# Patient Record
Sex: MALE | Race: ASIAN | NOT HISPANIC OR LATINO | URBAN - METROPOLITAN AREA
[De-identification: names, ages, dates, MRNs, and addresses within clinical notes are randomized per-mention and may not be internally consistent; named-entity substitution may affect disease eponyms.]

---

## 2021-05-02 ENCOUNTER — INPATIENT (INPATIENT)
Facility: HOSPITAL | Age: 61
LOS: 18 days | Discharge: HOME | DRG: 207 | End: 2021-05-21
Attending: INTERNAL MEDICINE | Admitting: INTERNAL MEDICINE
Payer: MEDICAID

## 2021-05-02 VITALS
RESPIRATION RATE: 19 BRPM | WEIGHT: 210.1 LBS | HEIGHT: 70 IN | HEART RATE: 89 BPM | DIASTOLIC BLOOD PRESSURE: 70 MMHG | OXYGEN SATURATION: 96 % | TEMPERATURE: 98 F | SYSTOLIC BLOOD PRESSURE: 110 MMHG

## 2021-05-02 PROCEDURE — 99291 CRITICAL CARE FIRST HOUR: CPT

## 2021-05-02 NOTE — ED ADULT TRIAGE NOTE - CHIEF COMPLAINT QUOTE
elevated temperatures today with shortness of breath upon laying down. (desat to 88%). daughter placed on O2 at home and suspects COVID.

## 2021-05-03 DIAGNOSIS — J96.01 ACUTE RESPIRATORY FAILURE WITH HYPOXIA: ICD-10-CM

## 2021-05-03 DIAGNOSIS — N17.9 ACUTE KIDNEY FAILURE, UNSPECIFIED: ICD-10-CM

## 2021-05-03 DIAGNOSIS — R74.8 ABNORMAL LEVELS OF OTHER SERUM ENZYMES: ICD-10-CM

## 2021-05-03 DIAGNOSIS — Z29.9 ENCOUNTER FOR PROPHYLACTIC MEASURES, UNSPECIFIED: ICD-10-CM

## 2021-05-03 DIAGNOSIS — R09.02 HYPOXEMIA: ICD-10-CM

## 2021-05-03 DIAGNOSIS — Z78.9 OTHER SPECIFIED HEALTH STATUS: Chronic | ICD-10-CM

## 2021-05-03 DIAGNOSIS — E11.9 TYPE 2 DIABETES MELLITUS WITHOUT COMPLICATIONS: ICD-10-CM

## 2021-05-03 DIAGNOSIS — E78.5 HYPERLIPIDEMIA, UNSPECIFIED: ICD-10-CM

## 2021-05-03 DIAGNOSIS — I10 ESSENTIAL (PRIMARY) HYPERTENSION: ICD-10-CM

## 2021-05-03 DIAGNOSIS — U07.1 COVID-19: ICD-10-CM

## 2021-05-03 LAB
ALBUMIN SERPL ELPH-MCNC: 3.3 G/DL — SIGNIFICANT CHANGE UP (ref 3.3–5)
ALBUMIN SERPL ELPH-MCNC: 3.9 G/DL — SIGNIFICANT CHANGE UP (ref 3.3–5)
ALP SERPL-CCNC: 81 U/L — SIGNIFICANT CHANGE UP (ref 40–120)
ALP SERPL-CCNC: 93 U/L — SIGNIFICANT CHANGE UP (ref 40–120)
ALT FLD-CCNC: 132 U/L — HIGH (ref 10–45)
ALT FLD-CCNC: 157 U/L — HIGH (ref 10–45)
ANION GAP SERPL CALC-SCNC: 11 MMOL/L — SIGNIFICANT CHANGE UP (ref 5–17)
ANION GAP SERPL CALC-SCNC: 16 MMOL/L — SIGNIFICANT CHANGE UP (ref 5–17)
AST SERPL-CCNC: 104 U/L — HIGH (ref 10–40)
AST SERPL-CCNC: 136 U/L — HIGH (ref 10–40)
BASOPHILS # BLD AUTO: 0 K/UL — SIGNIFICANT CHANGE UP (ref 0–0.2)
BASOPHILS # BLD AUTO: 0.01 K/UL — SIGNIFICANT CHANGE UP (ref 0–0.2)
BASOPHILS NFR BLD AUTO: 0 % — SIGNIFICANT CHANGE UP (ref 0–2)
BASOPHILS NFR BLD AUTO: 0.2 % — SIGNIFICANT CHANGE UP (ref 0–2)
BILIRUB SERPL-MCNC: 0.6 MG/DL — SIGNIFICANT CHANGE UP (ref 0.2–1.2)
BILIRUB SERPL-MCNC: 0.7 MG/DL — SIGNIFICANT CHANGE UP (ref 0.2–1.2)
BUN SERPL-MCNC: 20 MG/DL — SIGNIFICANT CHANGE UP (ref 7–23)
BUN SERPL-MCNC: 21 MG/DL — SIGNIFICANT CHANGE UP (ref 7–23)
CALCIUM SERPL-MCNC: 8.6 MG/DL — SIGNIFICANT CHANGE UP (ref 8.4–10.5)
CALCIUM SERPL-MCNC: 9 MG/DL — SIGNIFICANT CHANGE UP (ref 8.4–10.5)
CHLORIDE SERPL-SCNC: 101 MMOL/L — SIGNIFICANT CHANGE UP (ref 96–108)
CHLORIDE SERPL-SCNC: 99 MMOL/L — SIGNIFICANT CHANGE UP (ref 96–108)
CO2 SERPL-SCNC: 23 MMOL/L — SIGNIFICANT CHANGE UP (ref 22–31)
CO2 SERPL-SCNC: 24 MMOL/L — SIGNIFICANT CHANGE UP (ref 22–31)
CREAT SERPL-MCNC: 1.17 MG/DL — SIGNIFICANT CHANGE UP (ref 0.5–1.3)
CREAT SERPL-MCNC: 1.38 MG/DL — HIGH (ref 0.5–1.3)
CRP SERPL-MCNC: 82 MG/L — HIGH (ref 0–4)
D DIMER BLD IA.RAPID-MCNC: 258 NG/ML DDU — HIGH
EOSINOPHIL # BLD AUTO: 0 K/UL — SIGNIFICANT CHANGE UP (ref 0–0.5)
EOSINOPHIL # BLD AUTO: 0.17 K/UL — SIGNIFICANT CHANGE UP (ref 0–0.5)
EOSINOPHIL NFR BLD AUTO: 0 % — SIGNIFICANT CHANGE UP (ref 0–6)
EOSINOPHIL NFR BLD AUTO: 2.8 % — SIGNIFICANT CHANGE UP (ref 0–6)
FERRITIN SERPL-MCNC: 1765 NG/ML — HIGH (ref 30–400)
GAS PNL BLDV: SIGNIFICANT CHANGE UP
GLUCOSE SERPL-MCNC: 149 MG/DL — HIGH (ref 70–99)
GLUCOSE SERPL-MCNC: 187 MG/DL — HIGH (ref 70–99)
HCT VFR BLD CALC: 42.9 % — SIGNIFICANT CHANGE UP (ref 39–50)
HCT VFR BLD CALC: 47.9 % — SIGNIFICANT CHANGE UP (ref 39–50)
HGB BLD-MCNC: 14.3 G/DL — SIGNIFICANT CHANGE UP (ref 13–17)
HGB BLD-MCNC: 15.6 G/DL — SIGNIFICANT CHANGE UP (ref 13–17)
IMM GRANULOCYTES NFR BLD AUTO: 0.5 % — SIGNIFICANT CHANGE UP (ref 0–1.5)
IMM GRANULOCYTES NFR BLD AUTO: 0.5 % — SIGNIFICANT CHANGE UP (ref 0–1.5)
INR BLD: 0.99 RATIO — SIGNIFICANT CHANGE UP (ref 0.88–1.16)
LYMPHOCYTES # BLD AUTO: 0.38 K/UL — LOW (ref 1–3.3)
LYMPHOCYTES # BLD AUTO: 0.66 K/UL — LOW (ref 1–3.3)
LYMPHOCYTES # BLD AUTO: 10.8 % — LOW (ref 13–44)
LYMPHOCYTES # BLD AUTO: 9.8 % — LOW (ref 13–44)
MAGNESIUM SERPL-MCNC: 2.3 MG/DL — SIGNIFICANT CHANGE UP (ref 1.6–2.6)
MCHC RBC-ENTMCNC: 28.5 PG — SIGNIFICANT CHANGE UP (ref 27–34)
MCHC RBC-ENTMCNC: 29.1 PG — SIGNIFICANT CHANGE UP (ref 27–34)
MCHC RBC-ENTMCNC: 32.6 GM/DL — SIGNIFICANT CHANGE UP (ref 32–36)
MCHC RBC-ENTMCNC: 33.3 GM/DL — SIGNIFICANT CHANGE UP (ref 32–36)
MCV RBC AUTO: 87.2 FL — SIGNIFICANT CHANGE UP (ref 80–100)
MCV RBC AUTO: 87.4 FL — SIGNIFICANT CHANGE UP (ref 80–100)
MONOCYTES # BLD AUTO: 0.2 K/UL — SIGNIFICANT CHANGE UP (ref 0–0.9)
MONOCYTES # BLD AUTO: 0.52 K/UL — SIGNIFICANT CHANGE UP (ref 0–0.9)
MONOCYTES NFR BLD AUTO: 5.2 % — SIGNIFICANT CHANGE UP (ref 2–14)
MONOCYTES NFR BLD AUTO: 8.5 % — SIGNIFICANT CHANGE UP (ref 2–14)
NEUTROPHILS # BLD AUTO: 3.28 K/UL — SIGNIFICANT CHANGE UP (ref 1.8–7.4)
NEUTROPHILS # BLD AUTO: 4.71 K/UL — SIGNIFICANT CHANGE UP (ref 1.8–7.4)
NEUTROPHILS NFR BLD AUTO: 77.2 % — HIGH (ref 43–77)
NEUTROPHILS NFR BLD AUTO: 84.5 % — HIGH (ref 43–77)
NRBC # BLD: 0 /100 WBCS — SIGNIFICANT CHANGE UP (ref 0–0)
NRBC # BLD: 0 /100 WBCS — SIGNIFICANT CHANGE UP (ref 0–0)
NT-PROBNP SERPL-SCNC: 143 PG/ML — SIGNIFICANT CHANGE UP (ref 0–300)
PHOSPHATE SERPL-MCNC: 3.3 MG/DL — SIGNIFICANT CHANGE UP (ref 2.5–4.5)
PLATELET # BLD AUTO: 183 K/UL — SIGNIFICANT CHANGE UP (ref 150–400)
PLATELET # BLD AUTO: 205 K/UL — SIGNIFICANT CHANGE UP (ref 150–400)
POTASSIUM SERPL-MCNC: 4.1 MMOL/L — SIGNIFICANT CHANGE UP (ref 3.5–5.3)
POTASSIUM SERPL-MCNC: 4.8 MMOL/L — SIGNIFICANT CHANGE UP (ref 3.5–5.3)
POTASSIUM SERPL-SCNC: 4.1 MMOL/L — SIGNIFICANT CHANGE UP (ref 3.5–5.3)
POTASSIUM SERPL-SCNC: 4.8 MMOL/L — SIGNIFICANT CHANGE UP (ref 3.5–5.3)
PROCALCITONIN SERPL-MCNC: 0.25 NG/ML — HIGH (ref 0.02–0.1)
PROT SERPL-MCNC: 6.5 G/DL — SIGNIFICANT CHANGE UP (ref 6–8.3)
PROT SERPL-MCNC: 7.4 G/DL — SIGNIFICANT CHANGE UP (ref 6–8.3)
PROTHROM AB SERPL-ACNC: 11.9 SEC — SIGNIFICANT CHANGE UP (ref 10.6–13.6)
RBC # BLD: 4.92 M/UL — SIGNIFICANT CHANGE UP (ref 4.2–5.8)
RBC # BLD: 5.48 M/UL — SIGNIFICANT CHANGE UP (ref 4.2–5.8)
RBC # FLD: 11.9 % — SIGNIFICANT CHANGE UP (ref 10.3–14.5)
RBC # FLD: 11.9 % — SIGNIFICANT CHANGE UP (ref 10.3–14.5)
SARS-COV-2 RNA SPEC QL NAA+PROBE: DETECTED
SODIUM SERPL-SCNC: 136 MMOL/L — SIGNIFICANT CHANGE UP (ref 135–145)
SODIUM SERPL-SCNC: 138 MMOL/L — SIGNIFICANT CHANGE UP (ref 135–145)
TROPONIN T, HIGH SENSITIVITY RESULT: 11 NG/L — SIGNIFICANT CHANGE UP (ref 0–51)
WBC # BLD: 3.88 K/UL — SIGNIFICANT CHANGE UP (ref 3.8–10.5)
WBC # BLD: 6.1 K/UL — SIGNIFICANT CHANGE UP (ref 3.8–10.5)
WBC # FLD AUTO: 3.88 K/UL — SIGNIFICANT CHANGE UP (ref 3.8–10.5)
WBC # FLD AUTO: 6.1 K/UL — SIGNIFICANT CHANGE UP (ref 3.8–10.5)

## 2021-05-03 PROCEDURE — 71045 X-RAY EXAM CHEST 1 VIEW: CPT | Mod: 26

## 2021-05-03 PROCEDURE — 99223 1ST HOSP IP/OBS HIGH 75: CPT

## 2021-05-03 RX ORDER — ENOXAPARIN SODIUM 100 MG/ML
40 INJECTION SUBCUTANEOUS DAILY
Refills: 0 | Status: DISCONTINUED | OUTPATIENT
Start: 2021-05-03 | End: 2021-05-18

## 2021-05-03 RX ORDER — LOSARTAN POTASSIUM 100 MG/1
100 TABLET, FILM COATED ORAL DAILY
Refills: 0 | Status: DISCONTINUED | OUTPATIENT
Start: 2021-05-03 | End: 2021-05-03

## 2021-05-03 RX ORDER — DEXTROSE 50 % IN WATER 50 %
15 SYRINGE (ML) INTRAVENOUS ONCE
Refills: 0 | Status: DISCONTINUED | OUTPATIENT
Start: 2021-05-03 | End: 2021-05-11

## 2021-05-03 RX ORDER — LOSARTAN POTASSIUM 100 MG/1
1 TABLET, FILM COATED ORAL
Qty: 0 | Refills: 0 | DISCHARGE

## 2021-05-03 RX ORDER — SODIUM CHLORIDE 9 MG/ML
1000 INJECTION, SOLUTION INTRAVENOUS
Refills: 0 | Status: DISCONTINUED | OUTPATIENT
Start: 2021-05-03 | End: 2021-05-11

## 2021-05-03 RX ORDER — METFORMIN HYDROCHLORIDE 850 MG/1
1 TABLET ORAL
Qty: 0 | Refills: 0 | DISCHARGE

## 2021-05-03 RX ORDER — REMDESIVIR 5 MG/ML
100 INJECTION INTRAVENOUS EVERY 24 HOURS
Refills: 0 | Status: COMPLETED | OUTPATIENT
Start: 2021-05-04 | End: 2021-05-07

## 2021-05-03 RX ORDER — ACETAMINOPHEN 500 MG
650 TABLET ORAL ONCE
Refills: 0 | Status: COMPLETED | OUTPATIENT
Start: 2021-05-03 | End: 2021-05-03

## 2021-05-03 RX ORDER — REMDESIVIR 5 MG/ML
INJECTION INTRAVENOUS
Refills: 0 | Status: COMPLETED | OUTPATIENT
Start: 2021-05-03 | End: 2021-05-07

## 2021-05-03 RX ORDER — ATORVASTATIN CALCIUM 80 MG/1
20 TABLET, FILM COATED ORAL AT BEDTIME
Refills: 0 | Status: DISCONTINUED | OUTPATIENT
Start: 2021-05-03 | End: 2021-05-18

## 2021-05-03 RX ORDER — DEXAMETHASONE 0.5 MG/5ML
6 ELIXIR ORAL ONCE
Refills: 0 | Status: COMPLETED | OUTPATIENT
Start: 2021-05-03 | End: 2021-05-03

## 2021-05-03 RX ORDER — DEXTROSE 50 % IN WATER 50 %
25 SYRINGE (ML) INTRAVENOUS ONCE
Refills: 0 | Status: DISCONTINUED | OUTPATIENT
Start: 2021-05-03 | End: 2021-05-11

## 2021-05-03 RX ORDER — GLUCAGON INJECTION, SOLUTION 0.5 MG/.1ML
1 INJECTION, SOLUTION SUBCUTANEOUS ONCE
Refills: 0 | Status: DISCONTINUED | OUTPATIENT
Start: 2021-05-03 | End: 2021-05-11

## 2021-05-03 RX ORDER — DEXTROSE 50 % IN WATER 50 %
12.5 SYRINGE (ML) INTRAVENOUS ONCE
Refills: 0 | Status: DISCONTINUED | OUTPATIENT
Start: 2021-05-03 | End: 2021-05-11

## 2021-05-03 RX ORDER — INSULIN LISPRO 100/ML
VIAL (ML) SUBCUTANEOUS
Refills: 0 | Status: DISCONTINUED | OUTPATIENT
Start: 2021-05-03 | End: 2021-05-11

## 2021-05-03 RX ORDER — DEXAMETHASONE 0.5 MG/5ML
6 ELIXIR ORAL DAILY
Refills: 0 | Status: DISCONTINUED | OUTPATIENT
Start: 2021-05-04 | End: 2021-05-14

## 2021-05-03 RX ORDER — REMDESIVIR 5 MG/ML
200 INJECTION INTRAVENOUS EVERY 24 HOURS
Refills: 0 | Status: COMPLETED | OUTPATIENT
Start: 2021-05-03 | End: 2021-05-03

## 2021-05-03 RX ORDER — INSULIN LISPRO 100/ML
VIAL (ML) SUBCUTANEOUS AT BEDTIME
Refills: 0 | Status: DISCONTINUED | OUTPATIENT
Start: 2021-05-03 | End: 2021-05-11

## 2021-05-03 RX ORDER — ATORVASTATIN CALCIUM 80 MG/1
1 TABLET, FILM COATED ORAL
Qty: 0 | Refills: 0 | DISCHARGE

## 2021-05-03 RX ADMIN — Medication 650 MILLIGRAM(S): at 00:57

## 2021-05-03 RX ADMIN — Medication 2: at 17:43

## 2021-05-03 RX ADMIN — REMDESIVIR 500 MILLIGRAM(S): 5 INJECTION INTRAVENOUS at 09:37

## 2021-05-03 RX ADMIN — Medication 650 MILLIGRAM(S): at 02:50

## 2021-05-03 RX ADMIN — ATORVASTATIN CALCIUM 20 MILLIGRAM(S): 80 TABLET, FILM COATED ORAL at 21:11

## 2021-05-03 RX ADMIN — Medication 2: at 14:01

## 2021-05-03 RX ADMIN — Medication 6 MILLIGRAM(S): at 00:58

## 2021-05-03 NOTE — CHART NOTE - NSCHARTNOTEFT_GEN_A_CORE
Patient seen and examined.  On 3L O2 via NC. States that breathing is improved.  Continue with remdesivir. LFts are stable.  Cr has improved.   C/W supportive measures.

## 2021-05-03 NOTE — ED PROVIDER NOTE - ATTENDING CONTRIBUTION TO CARE
MD Sheikh:  patient seen and evaluated personally.   I agree with the History & Physical,  Impression & Plan other than what was detailed in my note.  MD Sheikh  61 yo hx of htn, dm, no pulm/cardiac hx, presenting w/ sob x 1 week, that has been getting progressively worse, pos orthopnea, no leg swelling, no hx of dvt/pe, no known covid contact, coughing clear sputum, afebrile hypoxic to 89 on ra,  unwell appearing, NC/AT,  conjunctiva non conjected, sclera anicteric, moist mucous membranes, neck supple, heart sounds, normal, no mrg, lungs have b/l crackles, , abd soft non distended w/ no tenderness, no visual deformities of extremities. neg martínez/homans, no swelling, no jvd, possible covid, vs chf, less likely acs, will get dimer. decadron, cxr, labs. pt will need admission for hypoxia

## 2021-05-03 NOTE — ED PROVIDER NOTE - NS ED ROS FT
ROS:  GENERAL: +fever  EYES: no change in vision  HEENT: no trouble swallowing, no trouble speaking  CARDIAC: no chest pain  PULMONARY: +cough and SOB   GI: no abdominal pain, no nausea, no vomiting, no diarrhea, no constipation  : No dysuria, no frequency, no change in appearance, or odor of urine  SKIN: no rashes  NEURO: no headache, no weakness  MSK: No joint pain    Jeffry Arenas PGY3

## 2021-05-03 NOTE — ED ADULT NURSE NOTE - NSIMPLEMENTINTERV_GEN_ALL_ED
Implemented All Fall Risk Interventions:  [place] to call system. Call bell, personal items and telephone within reach. Instruct patient to call for assistance. Room bathroom lighting operational. Non-slip footwear when patient is off stretcher. Physically safe environment: no spills, clutter or unnecessary equipment. Stretcher in lowest position, wheels locked, appropriate side rails in place. Provide visual cue, wrist band, yellow gown, etc. Monitor gait and stability. Monitor for mental status changes and reorient to person, place, and time. Review medications for side effects contributing to fall risk. Reinforce activity limits and safety measures with patient and family.

## 2021-05-03 NOTE — H&P ADULT - NSHPPHYSICALEXAM_GEN_ALL_CORE
PHYSICAL EXAM:  Vital Signs Last 24 Hrs  T(C): 37.1 (05-03-21 @ 02:49)  T(F): 98.7 (05-03-21 @ 02:49), Max: 98.7 (05-03-21 @ 02:49)  HR: 71 (05-03-21 @ 02:49) (71 - 89)  BP: 117/68 (05-03-21 @ 02:49)  BP(mean): 83 (05-03-21 @ 02:49) (81 - 83)  RR: 23 (05-03-21 @ 02:49) (19 - 26)  SpO2: 98% (05-03-21 @ 02:49) (88% - 98%)  Wt(kg): --    Constitutional: NAD, awake and alert  EYES: EOMI  ENT:  Normal Hearing, no tonsillar exudates   Neck: Soft and supple, No JVD  Lungs: mild bibasilar crackles  Heart: S1 and S2, regular rate and rhythm, no Murmurs, gallops or rubs  Abdomen: Bowel Sounds present, soft, nontender, nondistended, no guarding, no rebound  Extremities: No cyanosis or clubbing; warm to touch  Vascular: 2+ peripheral pulses lower ex  Neurological: A/O x 3, no focal deficits  Musculoskeletal: 5/5 strength b/l upper and lower extremities  Skin: No rashes  Psych: no depression or anhedonia  HEME: no bruises, no nose bleeds PHYSICAL EXAM:  Vital Signs Last 24 Hrs  T(C): 37.1 (05-03-21 @ 02:49)  T(F): 98.7 (05-03-21 @ 02:49), Max: 98.7 (05-03-21 @ 02:49)  HR: 71 (05-03-21 @ 02:49) (71 - 89)  BP: 117/68 (05-03-21 @ 02:49)  BP(mean): 83 (05-03-21 @ 02:49) (81 - 83)  RR: 23 (05-03-21 @ 02:49) (19 - 26)  SpO2: 98% (05-03-21 @ 02:49) (88% - 98%)  Wt(kg): --    Constitutional: NAD, awake and alert  EYES: EOMI  ENT:  Normal Hearing, no tonsillar exudates   Neck: Soft and supple, No JVD  Lungs: mild bibasilar crackles  Heart: S1 and S2, regular rate and rhythm, no Murmurs, gallops or rubs  Abdomen: Bowel Sounds present, soft, nontender, nondistended, no guarding, no rebound  Extremities: No cyanosis or clubbing; warm to touch  Vascular: 2+ peripheral pulses lower ex  Neurological: A/O x 3, no focal deficits  Musculoskeletal: 5/5 strength b/l upper and lower extremities; +straight leg test  Skin: No rashes  Psych: no depression or anhedonia  HEME: no bruises, no nose bleeds

## 2021-05-03 NOTE — H&P ADULT - PROBLEM SELECTOR PLAN 2
s/p IV dexamethasone, which will continue for now  start IV remdesivir (monitor LFTs)  c/w other symptomatic management  monitor inflammatory markers  c/w oxygen

## 2021-05-03 NOTE — ED ADULT NURSE REASSESSMENT NOTE - NS ED NURSE REASSESS COMMENT FT1
JAY Smith contacted regarding insulin order, order reads pre-meal insulin but special instructions indicate to give insulin coverage regardless of PO status, as per JAY Smith order insulin coverage to not be given since pt is not eating at this time, , VSS, pt A&Ox3, pending bed assignment, bed locked and in lowest position, safety and comfort measures maintained.

## 2021-05-03 NOTE — ED PROVIDER NOTE - PHYSICAL EXAMINATION
Gen: AAOx3, non-toxic  Head: NCAT  HEENT: EOMI, oral mucosa moist, normal conjunctiva  Lung: bibasilar crackles, no respiratory distress, speaking in full sentences  CV: RRR, no murmurs, rubs or gallops  Abd: soft, NTND  MSK: no visible deformities  Neuro: No focal sensory or motor deficits  Skin: Warm, well perfused, no rash, no LE edema or calf TTP   Psych: normal affect.     Jeffry Arenas PGY3

## 2021-05-03 NOTE — H&P ADULT - PROBLEM SELECTOR PLAN 3
Likely 2/2 to NAFLD  will monitor LFTs for now while patient is on remdesivir  check hepatitis panel  if LFTs worsening, will check US abdomen

## 2021-05-03 NOTE — H&P ADULT - NSHPLABSRESULTS_GEN_ALL_CORE
Labs personally reviewed:                          15.6   6.10  )-----------( 205      ( 03 May 2021 00:54 )             47.9     05-03    138  |  99  |  21  ----------------------------<  149<H>  4.1   |  23  |  1.38<H>    Ca    9.0      03 May 2021 00:54    TPro  7.4  /  Alb  3.9  /  TBili  0.7  /  DBili  x   /  AST  136<H>  /  ALT  157<H>  /  AlkPhos  93  05-03        LIVER FUNCTIONS - ( 03 May 2021 00:54 )  Alb: 3.9 g/dL / Pro: 7.4 g/dL / ALK PHOS: 93 U/L / ALT: 157 U/L / AST: 136 U/L / GGT: x           Imaging:  CXR personally reviewed: bibasilar opacity    EKG pending

## 2021-05-03 NOTE — ED ADULT NURSE REASSESSMENT NOTE - NS ED NURSE REASSESS COMMENT FT1
Report received from RN. Pt received sleeping resting comfortaby in bed, IV intact and patent, VSS, pt doesn't appear in acute distress, bed locked and in lowest position, call bell within reach,, safety and comfort measures maintained.

## 2021-05-03 NOTE — ED ADULT NURSE NOTE - OBJECTIVE STATEMENT
60yM presents to the ED from home with complaints of SOB, dry cough, decreased PO intake, and weakness x1week. PMHx of DM and HTN. x1week of dry cough with increased SOB (worse with exertion and lying flat). Pt reports decreased appetite r/t bitter taste in mouth. Associated weakness and feeling "tired". Pt denies fevers/chills, CP, N/V, abdominal pain, back pain, urinary symptoms, diaphoresis, lightheadedness/dizziness/syncope, HA. Upon arrival to ED, pt AAOx4, VS as documented, pt placed on spo2 and cardiac monitor. EKG completed. Pt awake but reports feeling sleepy/tired. Spo2 90% on RA, ambulatory spo2 88% and pt placed on 3L NC O2. Mild increased WOB and abdominal retractions noted. Pt abdomen soft and nontender to palpation. Pt has + pulses in UE and LE BL. Pt has no edema in LE BL. Pt able to ambulate with steady gait in ED. IV placed and labs drawn and sent. COVID swab sent. Pt medicated as per MD order. Bed locked in lowest position with appropriate side rails raised. Pt given call bell and explained call bell system. Pt aware of plan to await lab and radiology results. 60yM presents to the ED from home with complaints of SOB, dry cough, decreased PO intake, and weakness x1week. PMHx of DM and HTN. x1week of dry cough with increased SOB (worse with exertion and lying flat). Pt reports decreased appetite r/t bitter taste in mouth. Associated weakness and feeling "tired". Pt denies fevers/chills, CP, N/V, abdominal pain, back pain, urinary symptoms, diaphoresis, lightheadedness/dizziness/syncope, HA. Upon arrival to ED, pt AAOx4, VS as documented, pt placed on spo2 and cardiac monitor. EKG completed. Pt awake but reports feeling sleepy/tired. Spo2 90% on RA, ambulatory spo2 88% and pt placed on 3L NC O2 with spo2 99%. Mild increased WOB and abdominal retractions noted. Pt abdomen soft and nontender to palpation. Pt has + pulses in UE and LE BL. Pt has no edema in LE BL. Pt able to ambulate with steady gait in ED. IV placed and labs drawn and sent. COVID swab sent. Pt medicated as per MD order. Bed locked in lowest position with appropriate side rails raised. Pt given call bell and explained call bell system. Pt aware of plan to await lab and radiology results.

## 2021-05-03 NOTE — H&P ADULT - NSHPREVIEWOFSYSTEMS_GEN_ALL_CORE
CONSTITUTIONAL: No weakness, fevers or chills  EYES/ENT: No visual changes;  No dysphagia  NECK: No pain or stiffness  RESPIRATORY: +cough and SOB  CARDIOVASCULAR: No chest pain or palpitations; No lower extremity edema  EXTREMITIES: no le edema, cyanosis, clubbing  MUSCULOSKELETAL: myalgia  GASTROINTESTINAL: No abdominal or epigastric pain. No nausea, vomiting, or hematemesis; No diarrhea or constipation. No melena or hematochezia.  BACK: + back pain  GENITOURINARY: No dysuria, frequency or hematuria  NEUROLOGICAL: No numbness or weakness  SKIN: No itching, burning, rashes, or lesions   PSYCH: no agitation  All other review of systems is negative unless indicated above.

## 2021-05-03 NOTE — H&P ADULT - PROBLEM SELECTOR PLAN 4
unknown baseline creatinine  monitor BMP closely  hold losartan for now, if creatine improves will restart tomorrow

## 2021-05-03 NOTE — ED PROVIDER NOTE - CLINICAL SUMMARY MEDICAL DECISION MAKING FREE TEXT BOX
Fever, cough, SOB x 1 week. No chest pain. No history of heart disease or DVT/PE. Pt mildly tachypneic with bibasilar rales, hypoxic to high 80s on RA but stable in high 90s on 4L NC. Will assess for COVID/PNA, CHF, ACS, PTX with labs/XR & admit.

## 2021-05-03 NOTE — ED PROVIDER NOTE - OBJECTIVE STATEMENT
60M with PMH including HTN and DM p/w fever, dry cough, and SOB x 1 week. Denies any other symptoms including chest pain, abd pain, N/V/D, LE edema, history of cardiac disease, or history of DVT/PE. No sick contacts or travel history. No history of COVID or COVID vaccination.

## 2021-05-03 NOTE — H&P ADULT - HISTORY OF PRESENT ILLNESS
61 yo M with PMH of HTN, HLD, T2DM, presents here with SOB and hypoxia.  Patient started feeling fatigue and malaise on Monday.  Over the last three days, he also started having decreased appetite, mild cough, SOB, feverish, chills.  He has been mostly in bed, not able to do his usual ADLs.  This morning he had an episode of vomiting.  He was also having some back pain, which is worse with walking.  He had no diarrhea.  Daughter-in-law checked his pulse ox at home which was 88%, which prompted her to bring him to hospital.  While in ED, patient felt lethargic and had a brief 20 sec period of unresponsiveness.  Patient denies losing consciousness; states he remembers hearing his daughter-in-law calling his name, but he did not respond because he was not feeling well.  In ED, his vitals showed temp 98.5, HR 89, /70, RR 19-26, saturation 88% on RA.

## 2021-05-03 NOTE — H&P ADULT - NSICDXPASTMEDICALHX_GEN_ALL_CORE_FT
PAST MEDICAL HISTORY:  HLD (hyperlipidemia)     HTN (hypertension)     T2DM (type 2 diabetes mellitus)

## 2021-05-03 NOTE — H&P ADULT - PROBLEM SELECTOR PLAN 1
Likely 2/2 to COVID  unlikely bacterial pneumonia, will hold off on Abx for now  trend inflammatory markers  management for COVID as below

## 2021-05-04 LAB
A1C WITH ESTIMATED AVERAGE GLUCOSE RESULT: 7.2 % — HIGH (ref 4–5.6)
ALBUMIN SERPL ELPH-MCNC: 3.1 G/DL — LOW (ref 3.3–5)
ALP SERPL-CCNC: 85 U/L — SIGNIFICANT CHANGE UP (ref 40–120)
ALT FLD-CCNC: 181 U/L — HIGH (ref 10–45)
AST SERPL-CCNC: 167 U/L — HIGH (ref 10–40)
BILIRUB DIRECT SERPL-MCNC: 0.2 MG/DL — SIGNIFICANT CHANGE UP (ref 0–0.2)
BILIRUB INDIRECT FLD-MCNC: 0.3 MG/DL — SIGNIFICANT CHANGE UP (ref 0.2–1)
BILIRUB SERPL-MCNC: 0.5 MG/DL — SIGNIFICANT CHANGE UP (ref 0.2–1.2)
COVID-19 SPIKE DOMAIN AB INTERP: POSITIVE
COVID-19 SPIKE DOMAIN ANTIBODY RESULT: 48.5 U/ML — HIGH
CREAT SERPL-MCNC: 0.83 MG/DL — SIGNIFICANT CHANGE UP (ref 0.5–1.3)
ESTIMATED AVERAGE GLUCOSE: 160 MG/DL — HIGH (ref 68–114)
GLUCOSE BLDC GLUCOMTR-MCNC: 155 MG/DL — HIGH (ref 70–99)
GLUCOSE BLDC GLUCOMTR-MCNC: 177 MG/DL — HIGH (ref 70–99)
GLUCOSE BLDC GLUCOMTR-MCNC: 234 MG/DL — HIGH (ref 70–99)
HCV AB S/CO SERPL IA: 0.14 S/CO — SIGNIFICANT CHANGE UP (ref 0–0.99)
HCV AB SERPL-IMP: SIGNIFICANT CHANGE UP
INR BLD: 0.97 RATIO — SIGNIFICANT CHANGE UP (ref 0.88–1.16)
PROT SERPL-MCNC: 6.2 G/DL — SIGNIFICANT CHANGE UP (ref 6–8.3)
PROTHROM AB SERPL-ACNC: 11.7 SEC — SIGNIFICANT CHANGE UP (ref 10.6–13.6)
SARS-COV-2 IGG+IGM SERPL QL IA: 48.5 U/ML — HIGH
SARS-COV-2 IGG+IGM SERPL QL IA: POSITIVE

## 2021-05-04 PROCEDURE — 99233 SBSQ HOSP IP/OBS HIGH 50: CPT

## 2021-05-04 RX ADMIN — REMDESIVIR 500 MILLIGRAM(S): 5 INJECTION INTRAVENOUS at 10:36

## 2021-05-04 RX ADMIN — Medication 6 MILLIGRAM(S): at 05:28

## 2021-05-04 RX ADMIN — ATORVASTATIN CALCIUM 20 MILLIGRAM(S): 80 TABLET, FILM COATED ORAL at 21:24

## 2021-05-04 RX ADMIN — ENOXAPARIN SODIUM 40 MILLIGRAM(S): 100 INJECTION SUBCUTANEOUS at 12:42

## 2021-05-04 RX ADMIN — Medication 4: at 17:38

## 2021-05-04 RX ADMIN — Medication 2: at 12:41

## 2021-05-04 NOTE — PROGRESS NOTE ADULT - SUBJECTIVE AND OBJECTIVE BOX
Dr. Kaylin Mosley   Division of Hospital Medicine  [facility]   Pager: [phone]     Patient is a 60y old  Male who presents with a chief complaint of shortness of breath (03 May 2021 03:11)      SUBJECTIVE / OVERNIGHT EVENTS: Patient seen and examined at bedside. States that SOB is improving, complaints of mild cough, Denies an CP, abd pain and n.v No acute events overnight     ROS:  All other review of systems negative    Allergies    No Known Allergies    Intolerances        MEDICATIONS  (STANDING):  atorvastatin 20 milliGRAM(s) Oral at bedtime  dexAMETHasone  Injectable 6 milliGRAM(s) IV Push daily  dextrose 40% Gel 15 Gram(s) Oral once  dextrose 5%. 1000 milliLiter(s) (50 mL/Hr) IV Continuous <Continuous>  dextrose 5%. 1000 milliLiter(s) (100 mL/Hr) IV Continuous <Continuous>  dextrose 50% Injectable 25 Gram(s) IV Push once  dextrose 50% Injectable 12.5 Gram(s) IV Push once  dextrose 50% Injectable 25 Gram(s) IV Push once  enoxaparin Injectable 40 milliGRAM(s) SubCutaneous daily  glucagon  Injectable 1 milliGRAM(s) IntraMuscular once  insulin lispro (ADMELOG) corrective regimen sliding scale   SubCutaneous three times a day before meals  insulin lispro (ADMELOG) corrective regimen sliding scale   SubCutaneous at bedtime  remdesivir  IVPB   IV Intermittent   remdesivir  IVPB 100 milliGRAM(s) IV Intermittent every 24 hours    MEDICATIONS  (PRN):      Vital Signs Last 24 Hrs  T(C): 37.2 (04 May 2021 05:12), Max: 37.2 (03 May 2021 17:26)  T(F): 99 (04 May 2021 05:12), Max: 99 (03 May 2021 17:26)  HR: 76 (04 May 2021 05:12) (76 - 84)  BP: 118/72 (04 May 2021 05:12) (117/62 - 119/74)  BP(mean): --  RR: 18 (04 May 2021 05:12) (18 - 20)  SpO2: 95% (04 May 2021 05:12) (95% - 95%)  CAPILLARY BLOOD GLUCOSE      POCT Blood Glucose.: 177 mg/dL (04 May 2021 12:17)  POCT Blood Glucose.: 147 mg/dL (04 May 2021 08:26)  POCT Blood Glucose.: 116 mg/dL (03 May 2021 21:51)  POCT Blood Glucose.: 193 mg/dL (03 May 2021 16:49)    I&O's Summary    03 May 2021 07:01  -  04 May 2021 07:00  --------------------------------------------------------  IN: 0 mL / OUT: 1600 mL / NET: -1600 mL    04 May 2021 07:01  -  04 May 2021 14:16  --------------------------------------------------------  IN: 120 mL / OUT: 200 mL / NET: -80 mL        PHYSICAL EXAM:  GENERAL: NAD, well-developed +2L NC  HEAD:  Atraumatic, Normocephalic  EYES: EOMI, PERRLA, conjunctiva and sclera clear  NECK: Supple, No JVD  CHEST/LUNG: Clear to auscultation bilaterally; No wheeze  HEART: Regular rate and rhythm; No murmurs, rubs, or gallops  ABDOMEN: Soft, Nontender, Nondistended; Bowel sounds present  EXTREMITIES:  2+ Peripheral Pulses, No clubbing, cyanosis, or edema  NEUROLOGY: AAOx3, non-focal  PSYCH: calm  SKIN: No rashes or lesions    LABS:                        14.3   3.88  )-----------( 183      ( 03 May 2021 06:04 )             42.9     05-04    x   |  x   |  x   ----------------------------<  x   x    |  x   |  0.83    Ca    8.6      03 May 2021 06:04  Phos  3.3     05-03  Mg     2.3     05-03    TPro  6.2  /  Alb  3.1<L>  /  TBili  0.5  /  DBili  0.2  /  AST  167<H>  /  ALT  181<H>  /  AlkPhos  85  05-04    PT/INR - ( 04 May 2021 06:18 )   PT: 11.7 sec;   INR: 0.97 ratio                   RADIOLOGY & ADDITIONAL TESTS:    Care Discussed with Consultants/Other Providers: Medicine NP

## 2021-05-05 LAB
ALBUMIN SERPL ELPH-MCNC: 3.3 G/DL — SIGNIFICANT CHANGE UP (ref 3.3–5)
ALBUMIN SERPL ELPH-MCNC: 3.3 G/DL — SIGNIFICANT CHANGE UP (ref 3.3–5)
ALP SERPL-CCNC: 89 U/L — SIGNIFICANT CHANGE UP (ref 40–120)
ALP SERPL-CCNC: 89 U/L — SIGNIFICANT CHANGE UP (ref 40–120)
ALT FLD-CCNC: 174 U/L — HIGH (ref 10–45)
ALT FLD-CCNC: 177 U/L — HIGH (ref 10–45)
ANION GAP SERPL CALC-SCNC: 14 MMOL/L — SIGNIFICANT CHANGE UP (ref 5–17)
AST SERPL-CCNC: 97 U/L — HIGH (ref 10–40)
AST SERPL-CCNC: 97 U/L — HIGH (ref 10–40)
BILIRUB DIRECT SERPL-MCNC: 0.2 MG/DL — SIGNIFICANT CHANGE UP (ref 0–0.2)
BILIRUB INDIRECT FLD-MCNC: 0.4 MG/DL — SIGNIFICANT CHANGE UP (ref 0.2–1)
BILIRUB SERPL-MCNC: 0.6 MG/DL — SIGNIFICANT CHANGE UP (ref 0.2–1.2)
BILIRUB SERPL-MCNC: 0.6 MG/DL — SIGNIFICANT CHANGE UP (ref 0.2–1.2)
BUN SERPL-MCNC: 26 MG/DL — HIGH (ref 7–23)
CALCIUM SERPL-MCNC: 8.9 MG/DL — SIGNIFICANT CHANGE UP (ref 8.4–10.5)
CHLORIDE SERPL-SCNC: 105 MMOL/L — SIGNIFICANT CHANGE UP (ref 96–108)
CO2 SERPL-SCNC: 21 MMOL/L — LOW (ref 22–31)
CREAT SERPL-MCNC: 0.86 MG/DL — SIGNIFICANT CHANGE UP (ref 0.5–1.3)
CREAT SERPL-MCNC: 0.88 MG/DL — SIGNIFICANT CHANGE UP (ref 0.5–1.3)
GLUCOSE BLDC GLUCOMTR-MCNC: 110 MG/DL — HIGH (ref 70–99)
GLUCOSE BLDC GLUCOMTR-MCNC: 162 MG/DL — HIGH (ref 70–99)
GLUCOSE BLDC GLUCOMTR-MCNC: 187 MG/DL — HIGH (ref 70–99)
GLUCOSE BLDC GLUCOMTR-MCNC: 234 MG/DL — HIGH (ref 70–99)
GLUCOSE SERPL-MCNC: 126 MG/DL — HIGH (ref 70–99)
HCT VFR BLD CALC: 45.5 % — SIGNIFICANT CHANGE UP (ref 39–50)
HGB BLD-MCNC: 14.8 G/DL — SIGNIFICANT CHANGE UP (ref 13–17)
INR BLD: 1.07 RATIO — SIGNIFICANT CHANGE UP (ref 0.88–1.16)
MCHC RBC-ENTMCNC: 28.6 PG — SIGNIFICANT CHANGE UP (ref 27–34)
MCHC RBC-ENTMCNC: 32.5 GM/DL — SIGNIFICANT CHANGE UP (ref 32–36)
MCV RBC AUTO: 87.8 FL — SIGNIFICANT CHANGE UP (ref 80–100)
NRBC # BLD: 0 /100 WBCS — SIGNIFICANT CHANGE UP (ref 0–0)
PLATELET # BLD AUTO: 269 K/UL — SIGNIFICANT CHANGE UP (ref 150–400)
POTASSIUM SERPL-MCNC: 4.4 MMOL/L — SIGNIFICANT CHANGE UP (ref 3.5–5.3)
POTASSIUM SERPL-SCNC: 4.4 MMOL/L — SIGNIFICANT CHANGE UP (ref 3.5–5.3)
PROT SERPL-MCNC: 6.2 G/DL — SIGNIFICANT CHANGE UP (ref 6–8.3)
PROT SERPL-MCNC: 6.6 G/DL — SIGNIFICANT CHANGE UP (ref 6–8.3)
PROTHROM AB SERPL-ACNC: 12.8 SEC — SIGNIFICANT CHANGE UP (ref 10.6–13.6)
RBC # BLD: 5.18 M/UL — SIGNIFICANT CHANGE UP (ref 4.2–5.8)
RBC # FLD: 11.9 % — SIGNIFICANT CHANGE UP (ref 10.3–14.5)
SODIUM SERPL-SCNC: 140 MMOL/L — SIGNIFICANT CHANGE UP (ref 135–145)
WBC # BLD: 8.85 K/UL — SIGNIFICANT CHANGE UP (ref 3.8–10.5)
WBC # FLD AUTO: 8.85 K/UL — SIGNIFICANT CHANGE UP (ref 3.8–10.5)

## 2021-05-05 PROCEDURE — 99233 SBSQ HOSP IP/OBS HIGH 50: CPT

## 2021-05-05 RX ADMIN — ATORVASTATIN CALCIUM 20 MILLIGRAM(S): 80 TABLET, FILM COATED ORAL at 22:20

## 2021-05-05 RX ADMIN — Medication 2: at 17:28

## 2021-05-05 RX ADMIN — ENOXAPARIN SODIUM 40 MILLIGRAM(S): 100 INJECTION SUBCUTANEOUS at 12:43

## 2021-05-05 RX ADMIN — Medication 6 MILLIGRAM(S): at 06:07

## 2021-05-05 RX ADMIN — Medication 4: at 12:43

## 2021-05-05 RX ADMIN — REMDESIVIR 500 MILLIGRAM(S): 5 INJECTION INTRAVENOUS at 11:37

## 2021-05-05 NOTE — PROGRESS NOTE ADULT - SUBJECTIVE AND OBJECTIVE BOX
Dr. Kaylin Mosley   Division of Hospital Medicine  [facility]   Pager: [phone]     Patient is a 60y old  Male who presents with a chief complaint of shortness of breath (04 May 2021 14:16)      SUBJECTIVE / OVERNIGHT EVENTS: Patient seen and examined at bedside. Denies any CP, SOB, abd pain and n/v. no acute events overnight. this morning desatted to 84s on 2L NC      ROS:  All other review of systems negative    Allergies    No Known Allergies    Intolerances        MEDICATIONS  (STANDING):  atorvastatin 20 milliGRAM(s) Oral at bedtime  dexAMETHasone  Injectable 6 milliGRAM(s) IV Push daily  dextrose 40% Gel 15 Gram(s) Oral once  dextrose 5%. 1000 milliLiter(s) (50 mL/Hr) IV Continuous <Continuous>  dextrose 5%. 1000 milliLiter(s) (100 mL/Hr) IV Continuous <Continuous>  dextrose 50% Injectable 25 Gram(s) IV Push once  dextrose 50% Injectable 12.5 Gram(s) IV Push once  dextrose 50% Injectable 25 Gram(s) IV Push once  enoxaparin Injectable 40 milliGRAM(s) SubCutaneous daily  glucagon  Injectable 1 milliGRAM(s) IntraMuscular once  insulin lispro (ADMELOG) corrective regimen sliding scale   SubCutaneous three times a day before meals  insulin lispro (ADMELOG) corrective regimen sliding scale   SubCutaneous at bedtime  remdesivir  IVPB   IV Intermittent   remdesivir  IVPB 100 milliGRAM(s) IV Intermittent every 24 hours    MEDICATIONS  (PRN):      Vital Signs Last 24 Hrs  T(C): 37 (05 May 2021 04:45), Max: 37.2 (04 May 2021 20:58)  T(F): 98.6 (05 May 2021 04:45), Max: 99 (04 May 2021 20:58)  HR: 79 (05 May 2021 04:45) (78 - 81)  BP: 117/64 (05 May 2021 04:45) (111/67 - 122/73)  BP(mean): --  RR: 19 (05 May 2021 07:40) (18 - 19)  SpO2: 95% (05 May 2021 07:40) (84% - 95%)  CAPILLARY BLOOD GLUCOSE      POCT Blood Glucose.: 234 mg/dL (05 May 2021 12:10)  POCT Blood Glucose.: 110 mg/dL (05 May 2021 08:03)  POCT Blood Glucose.: 155 mg/dL (04 May 2021 21:30)  POCT Blood Glucose.: 234 mg/dL (04 May 2021 17:16)    I&O's Summary    04 May 2021 07:01  -  05 May 2021 07:00  --------------------------------------------------------  IN: 360 mL / OUT: 1075 mL / NET: -715 mL    05 May 2021 07:01  -  05 May 2021 12:44  --------------------------------------------------------  IN: 360 mL / OUT: 200 mL / NET: 160 mL        PHYSICAL EXAM:  GENERAL: NAD, well-developed+3L  HEAD:  Atraumatic, Normocephalic  EYES: EOMI, PERRLA, conjunctiva and sclera clear  NECK: Supple, No JVD  CHEST/LUNG: Clear to auscultation bilaterally; No wheeze  HEART: Regular rate and rhythm; No murmurs, rubs, or gallops  ABDOMEN: Soft, Nontender, Nondistended; Bowel sounds present  EXTREMITIES:  2+ Peripheral Pulses, No clubbing, cyanosis, or edema  NEUROLOGY: AAOx3, non-focal  PSYCH: calm  SKIN: No rashes or lesions    LABS:                        14.8   8.85  )-----------( 269      ( 05 May 2021 06:27 )             45.5     05-05    140  |  105  |  26<H>  ----------------------------<  126<H>  4.4   |  21<L>  |  0.86    Ca    8.9      05 May 2021 06:27    TPro  6.6  /  Alb  3.3  /  TBili  0.6  /  DBili  0.2  /  AST  97<H>  /  ALT  174<H>  /  AlkPhos  89  05-05    PT/INR - ( 05 May 2021 06:27 )   PT: 12.8 sec;   INR: 1.07 ratio                   RADIOLOGY & ADDITIONAL TESTS:    Care Discussed with Consultants/Other Providers: Medicine NP    Unable to reach son Héctor, unable to leave Voice message

## 2021-05-06 LAB
ALBUMIN SERPL ELPH-MCNC: 3.3 G/DL — SIGNIFICANT CHANGE UP (ref 3.3–5)
ALBUMIN SERPL ELPH-MCNC: 3.4 G/DL — SIGNIFICANT CHANGE UP (ref 3.3–5)
ALP SERPL-CCNC: 98 U/L — SIGNIFICANT CHANGE UP (ref 40–120)
ALP SERPL-CCNC: 98 U/L — SIGNIFICANT CHANGE UP (ref 40–120)
ALT FLD-CCNC: 159 U/L — HIGH (ref 10–45)
ALT FLD-CCNC: 161 U/L — HIGH (ref 10–45)
ANION GAP SERPL CALC-SCNC: 15 MMOL/L — SIGNIFICANT CHANGE UP (ref 5–17)
AST SERPL-CCNC: 76 U/L — HIGH (ref 10–40)
AST SERPL-CCNC: 76 U/L — HIGH (ref 10–40)
BILIRUB DIRECT SERPL-MCNC: 0.2 MG/DL — SIGNIFICANT CHANGE UP (ref 0–0.2)
BILIRUB INDIRECT FLD-MCNC: 0.4 MG/DL — SIGNIFICANT CHANGE UP (ref 0.2–1)
BILIRUB SERPL-MCNC: 0.6 MG/DL — SIGNIFICANT CHANGE UP (ref 0.2–1.2)
BILIRUB SERPL-MCNC: 0.6 MG/DL — SIGNIFICANT CHANGE UP (ref 0.2–1.2)
BUN SERPL-MCNC: 27 MG/DL — HIGH (ref 7–23)
CALCIUM SERPL-MCNC: 8.8 MG/DL — SIGNIFICANT CHANGE UP (ref 8.4–10.5)
CHLORIDE SERPL-SCNC: 105 MMOL/L — SIGNIFICANT CHANGE UP (ref 96–108)
CO2 SERPL-SCNC: 21 MMOL/L — LOW (ref 22–31)
CREAT SERPL-MCNC: 0.83 MG/DL — SIGNIFICANT CHANGE UP (ref 0.5–1.3)
CREAT SERPL-MCNC: 0.86 MG/DL — SIGNIFICANT CHANGE UP (ref 0.5–1.3)
CRP SERPL-MCNC: 56 MG/L — HIGH (ref 0–4)
D DIMER BLD IA.RAPID-MCNC: 253 NG/ML DDU — HIGH
FERRITIN SERPL-MCNC: 915 NG/ML — HIGH (ref 30–400)
GLUCOSE BLDC GLUCOMTR-MCNC: 119 MG/DL — HIGH (ref 70–99)
GLUCOSE BLDC GLUCOMTR-MCNC: 124 MG/DL — HIGH (ref 70–99)
GLUCOSE BLDC GLUCOMTR-MCNC: 125 MG/DL — HIGH (ref 70–99)
GLUCOSE BLDC GLUCOMTR-MCNC: 151 MG/DL — HIGH (ref 70–99)
GLUCOSE SERPL-MCNC: 105 MG/DL — HIGH (ref 70–99)
HCT VFR BLD CALC: 47.2 % — SIGNIFICANT CHANGE UP (ref 39–50)
HGB BLD-MCNC: 15.2 G/DL — SIGNIFICANT CHANGE UP (ref 13–17)
INR BLD: 1.04 RATIO — SIGNIFICANT CHANGE UP (ref 0.88–1.16)
MCHC RBC-ENTMCNC: 28.3 PG — SIGNIFICANT CHANGE UP (ref 27–34)
MCHC RBC-ENTMCNC: 32.2 GM/DL — SIGNIFICANT CHANGE UP (ref 32–36)
MCV RBC AUTO: 87.9 FL — SIGNIFICANT CHANGE UP (ref 80–100)
NRBC # BLD: 0 /100 WBCS — SIGNIFICANT CHANGE UP (ref 0–0)
PLATELET # BLD AUTO: 214 K/UL — SIGNIFICANT CHANGE UP (ref 150–400)
POTASSIUM SERPL-MCNC: 4.5 MMOL/L — SIGNIFICANT CHANGE UP (ref 3.5–5.3)
POTASSIUM SERPL-SCNC: 4.5 MMOL/L — SIGNIFICANT CHANGE UP (ref 3.5–5.3)
PROT SERPL-MCNC: 6.4 G/DL — SIGNIFICANT CHANGE UP (ref 6–8.3)
PROT SERPL-MCNC: 6.5 G/DL — SIGNIFICANT CHANGE UP (ref 6–8.3)
PROTHROM AB SERPL-ACNC: 12.4 SEC — SIGNIFICANT CHANGE UP (ref 10.6–13.6)
RBC # BLD: 5.37 M/UL — SIGNIFICANT CHANGE UP (ref 4.2–5.8)
RBC # FLD: 11.9 % — SIGNIFICANT CHANGE UP (ref 10.3–14.5)
SODIUM SERPL-SCNC: 141 MMOL/L — SIGNIFICANT CHANGE UP (ref 135–145)
WBC # BLD: 9.05 K/UL — SIGNIFICANT CHANGE UP (ref 3.8–10.5)
WBC # FLD AUTO: 9.05 K/UL — SIGNIFICANT CHANGE UP (ref 3.8–10.5)

## 2021-05-06 PROCEDURE — 99233 SBSQ HOSP IP/OBS HIGH 50: CPT

## 2021-05-06 RX ORDER — ACETAMINOPHEN 500 MG
650 TABLET ORAL EVERY 6 HOURS
Refills: 0 | Status: DISCONTINUED | OUTPATIENT
Start: 2021-05-06 | End: 2021-05-21

## 2021-05-06 RX ORDER — LIDOCAINE 4 G/100G
1 CREAM TOPICAL DAILY
Refills: 0 | Status: DISCONTINUED | OUTPATIENT
Start: 2021-05-06 | End: 2021-05-12

## 2021-05-06 RX ADMIN — ENOXAPARIN SODIUM 40 MILLIGRAM(S): 100 INJECTION SUBCUTANEOUS at 13:19

## 2021-05-06 RX ADMIN — Medication 2: at 13:19

## 2021-05-06 RX ADMIN — ATORVASTATIN CALCIUM 20 MILLIGRAM(S): 80 TABLET, FILM COATED ORAL at 21:33

## 2021-05-06 RX ADMIN — REMDESIVIR 500 MILLIGRAM(S): 5 INJECTION INTRAVENOUS at 09:33

## 2021-05-06 RX ADMIN — Medication 650 MILLIGRAM(S): at 13:36

## 2021-05-06 RX ADMIN — LIDOCAINE 1 PATCH: 4 CREAM TOPICAL at 18:13

## 2021-05-06 RX ADMIN — Medication 6 MILLIGRAM(S): at 06:42

## 2021-05-06 RX ADMIN — LIDOCAINE 1 PATCH: 4 CREAM TOPICAL at 19:25

## 2021-05-06 NOTE — PROGRESS NOTE ADULT - SUBJECTIVE AND OBJECTIVE BOX
Dr. Kaylin Mosley   Division of Hospital Medicine  [facility]   Pager: [phone]     Patient is a 60y old  Male who presents with a chief complaint of shortness of breath (05 May 2021 12:44)      SUBJECTIVE / OVERNIGHT EVENTS: Patient seen and examined at bedside. States the he feels well, denies any CP, SOB, abd pain and n/v. Overnight patient desatted to 88% on 6L, improved with proning     ROS:  All other review of systems negative    Allergies    No Known Allergies    Intolerances        MEDICATIONS  (STANDING):  atorvastatin 20 milliGRAM(s) Oral at bedtime  dexAMETHasone  Injectable 6 milliGRAM(s) IV Push daily  dextrose 40% Gel 15 Gram(s) Oral once  dextrose 5%. 1000 milliLiter(s) (50 mL/Hr) IV Continuous <Continuous>  dextrose 5%. 1000 milliLiter(s) (100 mL/Hr) IV Continuous <Continuous>  dextrose 50% Injectable 25 Gram(s) IV Push once  dextrose 50% Injectable 12.5 Gram(s) IV Push once  dextrose 50% Injectable 25 Gram(s) IV Push once  enoxaparin Injectable 40 milliGRAM(s) SubCutaneous daily  glucagon  Injectable 1 milliGRAM(s) IntraMuscular once  insulin lispro (ADMELOG) corrective regimen sliding scale   SubCutaneous three times a day before meals  insulin lispro (ADMELOG) corrective regimen sliding scale   SubCutaneous at bedtime  remdesivir  IVPB   IV Intermittent   remdesivir  IVPB 100 milliGRAM(s) IV Intermittent every 24 hours    MEDICATIONS  (PRN):      Vital Signs Last 24 Hrs  T(C): 37.1 (06 May 2021 04:39), Max: 37.2 (05 May 2021 13:13)  T(F): 98.8 (06 May 2021 04:39), Max: 99 (05 May 2021 13:13)  HR: 80 (06 May 2021 04:39) (74 - 80)  BP: 125/71 (06 May 2021 04:39) (120/69 - 125/71)  BP(mean): --  RR: 20 (05 May 2021 21:50) (18 - 20)  SpO2: 88% (05 May 2021 21:50) (88% - 92%)  CAPILLARY BLOOD GLUCOSE      POCT Blood Glucose.: 124 mg/dL (06 May 2021 08:34)  POCT Blood Glucose.: 162 mg/dL (05 May 2021 21:51)  POCT Blood Glucose.: 187 mg/dL (05 May 2021 17:10)  POCT Blood Glucose.: 234 mg/dL (05 May 2021 12:10)    I&O's Summary    05 May 2021 07:01  -  06 May 2021 07:00  --------------------------------------------------------  IN: 360 mL / OUT: 1100 mL / NET: -740 mL        PHYSICAL EXAM:  GENERAL: NAD, well-developed +6NC  HEAD:  Atraumatic, Normocephalic  EYES: EOMI, PERRLA, conjunctiva and sclera clear  NECK: Supple, No JVD  CHEST/LUNG: Clear to auscultation bilaterally; No wheeze  HEART: Regular rate and rhythm; No murmurs, rubs, or gallops  ABDOMEN: Soft, Nontender, Nondistended; Bowel sounds present  EXTREMITIES:  2+ Peripheral Pulses, No clubbing, cyanosis, or edema  NEUROLOGY: AAOx3, non-focal  PSYCH: calm  SKIN: No rashes or lesions    LABS:                        15.2   9.05  )-----------( 214      ( 06 May 2021 07:08 )             47.2     05-06    141  |  105  |  27<H>  ----------------------------<  105<H>  4.5   |  21<L>  |  0.86    Ca    8.8      06 May 2021 07:11    TPro  6.4  /  Alb  3.3  /  TBili  0.6  /  DBili  0.2  /  AST  76<H>  /  ALT  161<H>  /  AlkPhos  98  05-06    PT/INR - ( 06 May 2021 07:14 )   PT: 12.4 sec;   INR: 1.04 ratio                   RADIOLOGY & ADDITIONAL TESTS:    Care Discussed with Consultants/Other Providers: Medicine NP    Case Discussed with Myla [phone]

## 2021-05-07 LAB
ALBUMIN SERPL ELPH-MCNC: 3.1 G/DL — LOW (ref 3.3–5)
ALP SERPL-CCNC: 90 U/L — SIGNIFICANT CHANGE UP (ref 40–120)
ALT FLD-CCNC: 111 U/L — HIGH (ref 10–45)
ANION GAP SERPL CALC-SCNC: 16 MMOL/L — SIGNIFICANT CHANGE UP (ref 5–17)
AST SERPL-CCNC: 43 U/L — HIGH (ref 10–40)
BILIRUB SERPL-MCNC: 1.2 MG/DL — SIGNIFICANT CHANGE UP (ref 0.2–1.2)
BUN SERPL-MCNC: 23 MG/DL — SIGNIFICANT CHANGE UP (ref 7–23)
CALCIUM SERPL-MCNC: 8.3 MG/DL — LOW (ref 8.4–10.5)
CHLORIDE SERPL-SCNC: 100 MMOL/L — SIGNIFICANT CHANGE UP (ref 96–108)
CO2 SERPL-SCNC: 20 MMOL/L — LOW (ref 22–31)
CREAT SERPL-MCNC: 0.81 MG/DL — SIGNIFICANT CHANGE UP (ref 0.5–1.3)
GLUCOSE BLDC GLUCOMTR-MCNC: 154 MG/DL — HIGH (ref 70–99)
GLUCOSE BLDC GLUCOMTR-MCNC: 188 MG/DL — HIGH (ref 70–99)
GLUCOSE BLDC GLUCOMTR-MCNC: 246 MG/DL — HIGH (ref 70–99)
GLUCOSE BLDC GLUCOMTR-MCNC: 247 MG/DL — HIGH (ref 70–99)
GLUCOSE SERPL-MCNC: 130 MG/DL — HIGH (ref 70–99)
HCT VFR BLD CALC: 44.5 % — SIGNIFICANT CHANGE UP (ref 39–50)
HGB BLD-MCNC: 14.4 G/DL — SIGNIFICANT CHANGE UP (ref 13–17)
INR BLD: 1.21 RATIO — HIGH (ref 0.88–1.16)
MCHC RBC-ENTMCNC: 28.1 PG — SIGNIFICANT CHANGE UP (ref 27–34)
MCHC RBC-ENTMCNC: 32.4 GM/DL — SIGNIFICANT CHANGE UP (ref 32–36)
MCV RBC AUTO: 86.7 FL — SIGNIFICANT CHANGE UP (ref 80–100)
NRBC # BLD: 0 /100 WBCS — SIGNIFICANT CHANGE UP (ref 0–0)
PLATELET # BLD AUTO: 290 K/UL — SIGNIFICANT CHANGE UP (ref 150–400)
POTASSIUM SERPL-MCNC: 4 MMOL/L — SIGNIFICANT CHANGE UP (ref 3.5–5.3)
POTASSIUM SERPL-SCNC: 4 MMOL/L — SIGNIFICANT CHANGE UP (ref 3.5–5.3)
PROT SERPL-MCNC: 6 G/DL — SIGNIFICANT CHANGE UP (ref 6–8.3)
PROTHROM AB SERPL-ACNC: 14.4 SEC — HIGH (ref 10.6–13.6)
RBC # BLD: 5.13 M/UL — SIGNIFICANT CHANGE UP (ref 4.2–5.8)
RBC # FLD: 11.8 % — SIGNIFICANT CHANGE UP (ref 10.3–14.5)
SODIUM SERPL-SCNC: 136 MMOL/L — SIGNIFICANT CHANGE UP (ref 135–145)
WBC # BLD: 8.47 K/UL — SIGNIFICANT CHANGE UP (ref 3.8–10.5)
WBC # FLD AUTO: 8.47 K/UL — SIGNIFICANT CHANGE UP (ref 3.8–10.5)

## 2021-05-07 PROCEDURE — 99233 SBSQ HOSP IP/OBS HIGH 50: CPT

## 2021-05-07 RX ADMIN — LIDOCAINE 1 PATCH: 4 CREAM TOPICAL at 05:59

## 2021-05-07 RX ADMIN — Medication 4: at 17:26

## 2021-05-07 RX ADMIN — LIDOCAINE 1 PATCH: 4 CREAM TOPICAL at 19:10

## 2021-05-07 RX ADMIN — LIDOCAINE 1 PATCH: 4 CREAM TOPICAL at 12:52

## 2021-05-07 RX ADMIN — Medication 6 MILLIGRAM(S): at 05:20

## 2021-05-07 RX ADMIN — REMDESIVIR 500 MILLIGRAM(S): 5 INJECTION INTRAVENOUS at 09:29

## 2021-05-07 RX ADMIN — Medication 650 MILLIGRAM(S): at 05:20

## 2021-05-07 RX ADMIN — ENOXAPARIN SODIUM 40 MILLIGRAM(S): 100 INJECTION SUBCUTANEOUS at 12:51

## 2021-05-07 RX ADMIN — Medication 2: at 12:51

## 2021-05-07 RX ADMIN — Medication 2: at 08:48

## 2021-05-07 RX ADMIN — ATORVASTATIN CALCIUM 20 MILLIGRAM(S): 80 TABLET, FILM COATED ORAL at 22:00

## 2021-05-07 RX ADMIN — LIDOCAINE 1 PATCH: 4 CREAM TOPICAL at 23:16

## 2021-05-07 NOTE — PROGRESS NOTE ADULT - SUBJECTIVE AND OBJECTIVE BOX
Dr. Kaylin Mosley   Division of Hospital Medicine  [facility]   Pager: [phone]     Patient is a 60y old  Male who presents with a chief complaint of shortness of breath (07 May 2021 12:50)      SUBJECTIVE / OVERNIGHT EVENTS: Patient seen and examined at bedside. States that he feels ok, Denies any CP, SOB, abd pain and n/v. Overnight placed on NRB no report of desatting.     ROS:  All other review of systems negative    Allergies    No Known Allergies    Intolerances        MEDICATIONS  (STANDING):  atorvastatin 20 milliGRAM(s) Oral at bedtime  dexAMETHasone  Injectable 6 milliGRAM(s) IV Push daily  dextrose 40% Gel 15 Gram(s) Oral once  dextrose 5%. 1000 milliLiter(s) (50 mL/Hr) IV Continuous <Continuous>  dextrose 5%. 1000 milliLiter(s) (100 mL/Hr) IV Continuous <Continuous>  dextrose 50% Injectable 25 Gram(s) IV Push once  dextrose 50% Injectable 12.5 Gram(s) IV Push once  dextrose 50% Injectable 25 Gram(s) IV Push once  enoxaparin Injectable 40 milliGRAM(s) SubCutaneous daily  glucagon  Injectable 1 milliGRAM(s) IntraMuscular once  insulin lispro (ADMELOG) corrective regimen sliding scale   SubCutaneous three times a day before meals  insulin lispro (ADMELOG) corrective regimen sliding scale   SubCutaneous at bedtime  lidocaine   Patch 1 Patch Transdermal daily    MEDICATIONS  (PRN):  acetaminophen   Tablet .. 650 milliGRAM(s) Oral every 6 hours PRN Temp greater or equal to 38C (100.4F), Mild Pain (1 - 3)      Vital Signs Last 24 Hrs  T(C): 36.3 (07 May 2021 11:37), Max: 38.8 (07 May 2021 04:45)  T(F): 97.3 (07 May 2021 11:37), Max: 101.8 (07 May 2021 04:45)  HR: 61 (07 May 2021 11:37) (61 - 84)  BP: 111/61 (07 May 2021 11:37) (111/61 - 136/76)  BP(mean): --  RR: 20 (07 May 2021 11:37) (19 - 20)  SpO2: 96% (07 May 2021 11:37) (87% - 100%)  CAPILLARY BLOOD GLUCOSE      POCT Blood Glucose.: 188 mg/dL (07 May 2021 12:16)  POCT Blood Glucose.: 154 mg/dL (07 May 2021 08:32)  POCT Blood Glucose.: 125 mg/dL (06 May 2021 20:51)  POCT Blood Glucose.: 119 mg/dL (06 May 2021 17:21)  POCT Blood Glucose.: 151 mg/dL (06 May 2021 13:16)    I&O's Summary    06 May 2021 07:01  -  07 May 2021 07:00  --------------------------------------------------------  IN: 0 mL / OUT: 525 mL / NET: -525 mL    07 May 2021 07:01  -  07 May 2021 12:53  --------------------------------------------------------  IN: 250 mL / OUT: 0 mL / NET: 250 mL        PHYSICAL EXAM:  GENERAL: NAD, well-developed +NRB  HEAD:  Atraumatic, Normocephalic  EYES: EOMI, PERRLA, conjunctiva and sclera clear  NECK: Supple, No JVD  CHEST/LUNG: Clear to auscultation bilaterally; No wheeze  HEART: Regular rate and rhythm; No murmurs, rubs, or gallops  ABDOMEN: Soft, Nontender, Nondistended; Bowel sounds present  EXTREMITIES:  2+ Peripheral Pulses, No clubbing, cyanosis, or edema  NEUROLOGY: AAOx3, non-focal  PSYCH: calm  SKIN: No rashes or lesions    LABS:                        14.4   8.47  )-----------( 290      ( 07 May 2021 07:01 )             44.5     05-07    136  |  100  |  23  ----------------------------<  130<H>  4.0   |  20<L>  |  0.81    Ca    8.3<L>      07 May 2021 07:09    TPro  6.0  /  Alb  3.1<L>  /  TBili  1.2  /  DBili  x   /  AST  43<H>  /  ALT  111<H>  /  AlkPhos  90  05-07    PT/INR - ( 07 May 2021 07:01 )   PT: 14.4 sec;   INR: 1.21 ratio                   RADIOLOGY & ADDITIONAL TESTS:    Care Discussed with Consultants/Other Providers: Medicine NP    Case Discussed with DTR-in-law Myla [phone]

## 2021-05-08 LAB
ALBUMIN SERPL ELPH-MCNC: 3 G/DL — LOW (ref 3.3–5)
ALP SERPL-CCNC: 96 U/L — SIGNIFICANT CHANGE UP (ref 40–120)
ALT FLD-CCNC: 83 U/L — HIGH (ref 10–45)
ANION GAP SERPL CALC-SCNC: 15 MMOL/L — SIGNIFICANT CHANGE UP (ref 5–17)
AST SERPL-CCNC: 25 U/L — SIGNIFICANT CHANGE UP (ref 10–40)
BILIRUB DIRECT SERPL-MCNC: 0.2 MG/DL — SIGNIFICANT CHANGE UP (ref 0–0.2)
BILIRUB SERPL-MCNC: 0.5 MG/DL — SIGNIFICANT CHANGE UP (ref 0.2–1.2)
BUN SERPL-MCNC: 26 MG/DL — HIGH (ref 7–23)
CALCIUM SERPL-MCNC: 8.6 MG/DL — SIGNIFICANT CHANGE UP (ref 8.4–10.5)
CHLORIDE SERPL-SCNC: 104 MMOL/L — SIGNIFICANT CHANGE UP (ref 96–108)
CO2 SERPL-SCNC: 22 MMOL/L — SIGNIFICANT CHANGE UP (ref 22–31)
CREAT SERPL-MCNC: 0.82 MG/DL — SIGNIFICANT CHANGE UP (ref 0.5–1.3)
GLUCOSE BLDC GLUCOMTR-MCNC: 187 MG/DL — HIGH (ref 70–99)
GLUCOSE BLDC GLUCOMTR-MCNC: 206 MG/DL — HIGH (ref 70–99)
GLUCOSE BLDC GLUCOMTR-MCNC: 228 MG/DL — HIGH (ref 70–99)
GLUCOSE BLDC GLUCOMTR-MCNC: 280 MG/DL — HIGH (ref 70–99)
GLUCOSE SERPL-MCNC: 160 MG/DL — HIGH (ref 70–99)
HCT VFR BLD CALC: 46 % — SIGNIFICANT CHANGE UP (ref 39–50)
HGB BLD-MCNC: 15.2 G/DL — SIGNIFICANT CHANGE UP (ref 13–17)
INR BLD: 1.13 RATIO — SIGNIFICANT CHANGE UP (ref 0.88–1.16)
MCHC RBC-ENTMCNC: 28.6 PG — SIGNIFICANT CHANGE UP (ref 27–34)
MCHC RBC-ENTMCNC: 33 GM/DL — SIGNIFICANT CHANGE UP (ref 32–36)
MCV RBC AUTO: 86.6 FL — SIGNIFICANT CHANGE UP (ref 80–100)
NRBC # BLD: 0 /100 WBCS — SIGNIFICANT CHANGE UP (ref 0–0)
PLATELET # BLD AUTO: 349 K/UL — SIGNIFICANT CHANGE UP (ref 150–400)
POTASSIUM SERPL-MCNC: 4 MMOL/L — SIGNIFICANT CHANGE UP (ref 3.5–5.3)
POTASSIUM SERPL-SCNC: 4 MMOL/L — SIGNIFICANT CHANGE UP (ref 3.5–5.3)
PROT SERPL-MCNC: 6.2 G/DL — SIGNIFICANT CHANGE UP (ref 6–8.3)
PROTHROM AB SERPL-ACNC: 13.5 SEC — SIGNIFICANT CHANGE UP (ref 10.6–13.6)
RBC # BLD: 5.31 M/UL — SIGNIFICANT CHANGE UP (ref 4.2–5.8)
RBC # FLD: 11.7 % — SIGNIFICANT CHANGE UP (ref 10.3–14.5)
SODIUM SERPL-SCNC: 141 MMOL/L — SIGNIFICANT CHANGE UP (ref 135–145)
WBC # BLD: 10.47 K/UL — SIGNIFICANT CHANGE UP (ref 3.8–10.5)
WBC # FLD AUTO: 10.47 K/UL — SIGNIFICANT CHANGE UP (ref 3.8–10.5)

## 2021-05-08 PROCEDURE — 99233 SBSQ HOSP IP/OBS HIGH 50: CPT

## 2021-05-08 RX ORDER — ALBUTEROL 90 UG/1
1 AEROSOL, METERED ORAL EVERY 4 HOURS
Refills: 0 | Status: DISCONTINUED | OUTPATIENT
Start: 2021-05-08 | End: 2021-05-18

## 2021-05-08 RX ADMIN — LIDOCAINE 1 PATCH: 4 CREAM TOPICAL at 19:10

## 2021-05-08 RX ADMIN — Medication 4: at 12:38

## 2021-05-08 RX ADMIN — ENOXAPARIN SODIUM 40 MILLIGRAM(S): 100 INJECTION SUBCUTANEOUS at 12:38

## 2021-05-08 RX ADMIN — Medication 200 MILLIGRAM(S): at 12:38

## 2021-05-08 RX ADMIN — Medication 100 MILLIGRAM(S): at 17:02

## 2021-05-08 RX ADMIN — Medication 6: at 17:02

## 2021-05-08 RX ADMIN — Medication 4: at 08:24

## 2021-05-08 RX ADMIN — LIDOCAINE 1 PATCH: 4 CREAM TOPICAL at 12:38

## 2021-05-08 RX ADMIN — Medication 6 MILLIGRAM(S): at 05:03

## 2021-05-08 RX ADMIN — ATORVASTATIN CALCIUM 20 MILLIGRAM(S): 80 TABLET, FILM COATED ORAL at 21:39

## 2021-05-08 NOTE — PROVIDER CONTACT NOTE (OTHER) - ASSESSMENT
Pt. remains AOx4 VSS except temp.
pt aox4, other vss, no s/s of distress SOB or discomfort, denies pain or difficulty breathing, patient mouth breathing

## 2021-05-08 NOTE — PROGRESS NOTE ADULT - SUBJECTIVE AND OBJECTIVE BOX
Patient is a 60y old  Male who presents with a chief complaint of shortness of breath (07 May 2021 12:50)      SUBJECTIVE / OVERNIGHT EVENTS: Patient seen and examined at bedside. States that he feels ok, denies any CP, SOB, abd pain and n/v. No acute events overnight. This morning patient desatted to 87% on hiflo, improved with NRB to 92%    ROS:  All other review of systems negative    Allergies    No Known Allergies    Intolerances        MEDICATIONS  (STANDING):  atorvastatin 20 milliGRAM(s) Oral at bedtime  dexAMETHasone  Injectable 6 milliGRAM(s) IV Push daily  dextrose 40% Gel 15 Gram(s) Oral once  dextrose 5%. 1000 milliLiter(s) (50 mL/Hr) IV Continuous <Continuous>  dextrose 5%. 1000 milliLiter(s) (100 mL/Hr) IV Continuous <Continuous>  dextrose 50% Injectable 25 Gram(s) IV Push once  dextrose 50% Injectable 12.5 Gram(s) IV Push once  dextrose 50% Injectable 25 Gram(s) IV Push once  enoxaparin Injectable 40 milliGRAM(s) SubCutaneous daily  glucagon  Injectable 1 milliGRAM(s) IntraMuscular once  insulin lispro (ADMELOG) corrective regimen sliding scale   SubCutaneous three times a day before meals  insulin lispro (ADMELOG) corrective regimen sliding scale   SubCutaneous at bedtime  lidocaine   Patch 1 Patch Transdermal daily    MEDICATIONS  (PRN):  acetaminophen   Tablet .. 650 milliGRAM(s) Oral every 6 hours PRN Temp greater or equal to 38C (100.4F), Mild Pain (1 - 3)      Vital Signs Last 24 Hrs  T(C): 36.3 (08 May 2021 05:45), Max: 36.6 (07 May 2021 21:19)  T(F): 97.4 (08 May 2021 05:45), Max: 97.8 (07 May 2021 21:19)  HR: 67 (08 May 2021 10:24) (61 - 82)  BP: 107/69 (08 May 2021 05:45) (107/69 - 111/61)  BP(mean): --  RR: 19 (08 May 2021 10:24) (19 - 20)  SpO2: 91% (08 May 2021 10:24) (91% - 96%)  CAPILLARY BLOOD GLUCOSE      POCT Blood Glucose.: 206 mg/dL (08 May 2021 08:04)  POCT Blood Glucose.: 246 mg/dL (07 May 2021 21:42)  POCT Blood Glucose.: 247 mg/dL (07 May 2021 17:21)  POCT Blood Glucose.: 188 mg/dL (07 May 2021 12:16)    I&O's Summary    07 May 2021 07:01  -  08 May 2021 07:00  --------------------------------------------------------  IN: 250 mL / OUT: 1350 mL / NET: -1100 mL        PHYSICAL EXAM:  GENERAL: NAD, well-developed  HEAD:  Atraumatic, Normocephalic  EYES: EOMI, PERRLA, conjunctiva and sclera clear  NECK: Supple, No JVD  CHEST/LUNG: Clear to auscultation bilaterally; No wheeze  HEART: Regular rate and rhythm; No murmurs, rubs, or gallops  ABDOMEN: Soft, Nontender, Nondistended; Bowel sounds present  EXTREMITIES:  2+ Peripheral Pulses, No clubbing, cyanosis, or edema  NEUROLOGY: AAOx3, non-focal  PSYCH: calm  SKIN: No rashes or lesions    LABS:                        15.2   10.47 )-----------( 349      ( 08 May 2021 06:23 )             46.0     05-08    141  |  104  |  26<H>  ----------------------------<  160<H>  4.0   |  22  |  0.82    Ca    8.6      08 May 2021 06:25    TPro  6.2  /  Alb  3.0<L>  /  TBili  0.5  /  DBili  0.2  /  AST  25  /  ALT  83<H>  /  AlkPhos  96  05-08    PT/INR - ( 08 May 2021 06:23 )   PT: 13.5 sec;   INR: 1.13 ratio                   RADIOLOGY & ADDITIONAL TESTS:    Care Discussed with Consultants/Other Providers: Medicine NP and respiratory therapist

## 2021-05-08 NOTE — PROVIDER CONTACT NOTE (OTHER) - ACTION/TREATMENT ORDERED:
notify respiratory, respiratory increased to 100% HFNC pt is a mouth breather , reassessed and placed on non rebreather 15L maintaining >90% currently without desat as of 1900
LORENZO Johnson made aware. Tylenol 650 mg po was given for fever. Safety maintained

## 2021-05-09 LAB
ALBUMIN SERPL ELPH-MCNC: 3 G/DL — LOW (ref 3.3–5)
ALP SERPL-CCNC: 104 U/L — SIGNIFICANT CHANGE UP (ref 40–120)
ALT FLD-CCNC: 66 U/L — HIGH (ref 10–45)
ANION GAP SERPL CALC-SCNC: 15 MMOL/L — SIGNIFICANT CHANGE UP (ref 5–17)
AST SERPL-CCNC: 29 U/L — SIGNIFICANT CHANGE UP (ref 10–40)
BILIRUB DIRECT SERPL-MCNC: 0.1 MG/DL — SIGNIFICANT CHANGE UP (ref 0–0.2)
BILIRUB INDIRECT FLD-MCNC: 0.3 MG/DL — SIGNIFICANT CHANGE UP (ref 0.2–1)
BILIRUB SERPL-MCNC: 0.4 MG/DL — SIGNIFICANT CHANGE UP (ref 0.2–1.2)
BUN SERPL-MCNC: 29 MG/DL — HIGH (ref 7–23)
CALCIUM SERPL-MCNC: 8.7 MG/DL — SIGNIFICANT CHANGE UP (ref 8.4–10.5)
CHLORIDE SERPL-SCNC: 105 MMOL/L — SIGNIFICANT CHANGE UP (ref 96–108)
CO2 SERPL-SCNC: 22 MMOL/L — SIGNIFICANT CHANGE UP (ref 22–31)
CREAT SERPL-MCNC: 0.81 MG/DL — SIGNIFICANT CHANGE UP (ref 0.5–1.3)
CREAT SERPL-MCNC: 0.81 MG/DL — SIGNIFICANT CHANGE UP (ref 0.5–1.3)
CRP SERPL-MCNC: 50 MG/L — HIGH (ref 0–4)
D DIMER BLD IA.RAPID-MCNC: 512 NG/ML DDU — HIGH
FERRITIN SERPL-MCNC: 918 NG/ML — HIGH (ref 30–400)
GLUCOSE BLDC GLUCOMTR-MCNC: 144 MG/DL — HIGH (ref 70–99)
GLUCOSE BLDC GLUCOMTR-MCNC: 192 MG/DL — HIGH (ref 70–99)
GLUCOSE BLDC GLUCOMTR-MCNC: 198 MG/DL — HIGH (ref 70–99)
GLUCOSE BLDC GLUCOMTR-MCNC: 262 MG/DL — HIGH (ref 70–99)
GLUCOSE SERPL-MCNC: 138 MG/DL — HIGH (ref 70–99)
HCT VFR BLD CALC: 45.6 % — SIGNIFICANT CHANGE UP (ref 39–50)
HGB BLD-MCNC: 14.9 G/DL — SIGNIFICANT CHANGE UP (ref 13–17)
INR BLD: 1.03 RATIO — SIGNIFICANT CHANGE UP (ref 0.88–1.16)
MCHC RBC-ENTMCNC: 28.4 PG — SIGNIFICANT CHANGE UP (ref 27–34)
MCHC RBC-ENTMCNC: 32.7 GM/DL — SIGNIFICANT CHANGE UP (ref 32–36)
MCV RBC AUTO: 87 FL — SIGNIFICANT CHANGE UP (ref 80–100)
NRBC # BLD: 0 /100 WBCS — SIGNIFICANT CHANGE UP (ref 0–0)
PLATELET # BLD AUTO: 413 K/UL — HIGH (ref 150–400)
POTASSIUM SERPL-MCNC: 4.4 MMOL/L — SIGNIFICANT CHANGE UP (ref 3.5–5.3)
POTASSIUM SERPL-SCNC: 4.4 MMOL/L — SIGNIFICANT CHANGE UP (ref 3.5–5.3)
PROT SERPL-MCNC: 6.3 G/DL — SIGNIFICANT CHANGE UP (ref 6–8.3)
PROTHROM AB SERPL-ACNC: 12.3 SEC — SIGNIFICANT CHANGE UP (ref 10.6–13.6)
RBC # BLD: 5.24 M/UL — SIGNIFICANT CHANGE UP (ref 4.2–5.8)
RBC # FLD: 11.9 % — SIGNIFICANT CHANGE UP (ref 10.3–14.5)
SODIUM SERPL-SCNC: 142 MMOL/L — SIGNIFICANT CHANGE UP (ref 135–145)
WBC # BLD: 15.69 K/UL — HIGH (ref 3.8–10.5)
WBC # FLD AUTO: 15.69 K/UL — HIGH (ref 3.8–10.5)

## 2021-05-09 PROCEDURE — 99233 SBSQ HOSP IP/OBS HIGH 50: CPT

## 2021-05-09 RX ADMIN — Medication 6: at 17:02

## 2021-05-09 RX ADMIN — Medication 6 MILLIGRAM(S): at 05:38

## 2021-05-09 RX ADMIN — ATORVASTATIN CALCIUM 20 MILLIGRAM(S): 80 TABLET, FILM COATED ORAL at 22:19

## 2021-05-09 RX ADMIN — LIDOCAINE 1 PATCH: 4 CREAM TOPICAL at 12:51

## 2021-05-09 RX ADMIN — LIDOCAINE 1 PATCH: 4 CREAM TOPICAL at 00:30

## 2021-05-09 RX ADMIN — Medication 100 MILLIGRAM(S): at 05:38

## 2021-05-09 RX ADMIN — ENOXAPARIN SODIUM 40 MILLIGRAM(S): 100 INJECTION SUBCUTANEOUS at 12:51

## 2021-05-09 RX ADMIN — LIDOCAINE 1 PATCH: 4 CREAM TOPICAL at 19:35

## 2021-05-09 RX ADMIN — Medication 2: at 12:50

## 2021-05-09 RX ADMIN — Medication 200 MILLIGRAM(S): at 00:47

## 2021-05-09 NOTE — PROGRESS NOTE ADULT - SUBJECTIVE AND OBJECTIVE BOX
Patient is a 60y old  Male who presents with a chief complaint of shortness of breath (08 May 2021 10:45)      SUBJECTIVE / OVERNIGHT EVENTS: Patient seen and examined at bedside. He feels ok wants to go home, denies any CP, SOB, abd pain and n/v. No acute events overnight. Patient desatted to 87% on hiflo, improves with NRB to 95%    ROS:  All other review of systems negative    Allergies    No Known Allergies    Intolerances        MEDICATIONS  (STANDING):  atorvastatin 20 milliGRAM(s) Oral at bedtime  dexAMETHasone  Injectable 6 milliGRAM(s) IV Push daily  dextrose 40% Gel 15 Gram(s) Oral once  dextrose 5%. 1000 milliLiter(s) (50 mL/Hr) IV Continuous <Continuous>  dextrose 5%. 1000 milliLiter(s) (100 mL/Hr) IV Continuous <Continuous>  dextrose 50% Injectable 25 Gram(s) IV Push once  dextrose 50% Injectable 12.5 Gram(s) IV Push once  dextrose 50% Injectable 25 Gram(s) IV Push once  enoxaparin Injectable 40 milliGRAM(s) SubCutaneous daily  glucagon  Injectable 1 milliGRAM(s) IntraMuscular once  insulin lispro (ADMELOG) corrective regimen sliding scale   SubCutaneous three times a day before meals  insulin lispro (ADMELOG) corrective regimen sliding scale   SubCutaneous at bedtime  lidocaine   Patch 1 Patch Transdermal daily    MEDICATIONS  (PRN):  acetaminophen   Tablet .. 650 milliGRAM(s) Oral every 6 hours PRN Temp greater or equal to 38C (100.4F), Mild Pain (1 - 3)  ALBUTerol    90 MICROgram(s) HFA Inhaler 1 Puff(s) Inhalation every 4 hours PRN Shortness of Breath  benzonatate 100 milliGRAM(s) Oral every 8 hours PRN Cough  guaiFENesin Oral Liquid (Sugar-Free) 200 milliGRAM(s) Oral every 6 hours PRN Cough      Vital Signs Last 24 Hrs  T(C): 36.6 (09 May 2021 05:11), Max: 36.7 (08 May 2021 12:30)  T(F): 97.8 (09 May 2021 05:11), Max: 98 (08 May 2021 12:30)  HR: 80 (09 May 2021 05:11) (67 - 80)  BP: 125/63 (09 May 2021 05:11) (116/65 - 134/75)  BP(mean): --  RR: 20 (09 May 2021 07:43) (18 - 20)  SpO2: 90% (09 May 2021 07:43) (90% - 96%)  CAPILLARY BLOOD GLUCOSE      POCT Blood Glucose.: 144 mg/dL (09 May 2021 08:12)  POCT Blood Glucose.: 187 mg/dL (08 May 2021 21:40)  POCT Blood Glucose.: 280 mg/dL (08 May 2021 16:49)  POCT Blood Glucose.: 228 mg/dL (08 May 2021 12:22)    I&O's Summary    08 May 2021 07:01  -  09 May 2021 07:00  --------------------------------------------------------  IN: 870 mL / OUT: 1100 mL / NET: -230 mL        PHYSICAL EXAM:  GENERAL: NAD, well-developed +NRB  HEAD:  Atraumatic, Normocephalic  EYES: EOMI, PERRLA, conjunctiva and sclera clear  NECK: Supple, No JVD  CHEST/LUNG: coarse breath sounds; No wheeze  HEART: Regular rate and rhythm; No murmurs, rubs, or gallops  ABDOMEN: Soft, Nontender, Nondistended; Bowel sounds present  EXTREMITIES:  2+ Peripheral Pulses, No clubbing, cyanosis, or edema  NEUROLOGY: AAOx3, non-focal  PSYCH: calm  SKIN: No rashes or lesions    LABS:                        14.9   15.69 )-----------( 413      ( 09 May 2021 06:33 )             45.6     05-09    142  |  105  |  29<H>  ----------------------------<  138<H>  4.4   |  22  |  0.81    Ca    8.7      09 May 2021 06:33    TPro  6.3  /  Alb  3.0<L>  /  TBili  0.4  /  DBili  0.1  /  AST  29  /  ALT  66<H>  /  AlkPhos  104  05-09    PT/INR - ( 09 May 2021 06:33 )   PT: 12.3 sec;   INR: 1.03 ratio                   RADIOLOGY & ADDITIONAL TESTS:  Care Discussed with Consultants/Other Providers: Medicine NP    Case Discussed with Dtr-in-lawMyla

## 2021-05-10 LAB
ALBUMIN SERPL ELPH-MCNC: 3 G/DL — LOW (ref 3.3–5)
ALP SERPL-CCNC: 94 U/L — SIGNIFICANT CHANGE UP (ref 40–120)
ALT FLD-CCNC: 63 U/L — HIGH (ref 10–45)
AST SERPL-CCNC: 33 U/L — SIGNIFICANT CHANGE UP (ref 10–40)
BILIRUB DIRECT SERPL-MCNC: 0.2 MG/DL — SIGNIFICANT CHANGE UP (ref 0–0.2)
BILIRUB INDIRECT FLD-MCNC: 0.4 MG/DL — SIGNIFICANT CHANGE UP (ref 0.2–1)
BILIRUB SERPL-MCNC: 0.6 MG/DL — SIGNIFICANT CHANGE UP (ref 0.2–1.2)
CREAT SERPL-MCNC: 0.8 MG/DL — SIGNIFICANT CHANGE UP (ref 0.5–1.3)
GAS PNL BLDA: SIGNIFICANT CHANGE UP
GLUCOSE BLDC GLUCOMTR-MCNC: 165 MG/DL — HIGH (ref 70–99)
GLUCOSE BLDC GLUCOMTR-MCNC: 202 MG/DL — HIGH (ref 70–99)
GLUCOSE BLDC GLUCOMTR-MCNC: 220 MG/DL — HIGH (ref 70–99)
GLUCOSE BLDC GLUCOMTR-MCNC: 244 MG/DL — HIGH (ref 70–99)
INR BLD: 1.11 RATIO — SIGNIFICANT CHANGE UP (ref 0.88–1.16)
PROT SERPL-MCNC: 6 G/DL — SIGNIFICANT CHANGE UP (ref 6–8.3)
PROTHROM AB SERPL-ACNC: 13.2 SEC — SIGNIFICANT CHANGE UP (ref 10.6–13.6)

## 2021-05-10 PROCEDURE — 99233 SBSQ HOSP IP/OBS HIGH 50: CPT

## 2021-05-10 RX ORDER — POTASSIUM CHLORIDE 20 MEQ
40 PACKET (EA) ORAL ONCE
Refills: 0 | Status: DISCONTINUED | OUTPATIENT
Start: 2021-05-10 | End: 2021-05-10

## 2021-05-10 RX ADMIN — LIDOCAINE 1 PATCH: 4 CREAM TOPICAL at 12:29

## 2021-05-10 RX ADMIN — Medication 200 MILLIGRAM(S): at 03:06

## 2021-05-10 RX ADMIN — Medication 6 MILLIGRAM(S): at 05:07

## 2021-05-10 RX ADMIN — Medication 4: at 18:07

## 2021-05-10 RX ADMIN — Medication 4: at 12:29

## 2021-05-10 RX ADMIN — Medication 100 MILLIGRAM(S): at 05:07

## 2021-05-10 RX ADMIN — Medication 2: at 08:07

## 2021-05-10 RX ADMIN — ATORVASTATIN CALCIUM 20 MILLIGRAM(S): 80 TABLET, FILM COATED ORAL at 21:44

## 2021-05-10 RX ADMIN — LIDOCAINE 1 PATCH: 4 CREAM TOPICAL at 19:04

## 2021-05-10 RX ADMIN — Medication 100 MILLIGRAM(S): at 21:44

## 2021-05-10 RX ADMIN — LIDOCAINE 1 PATCH: 4 CREAM TOPICAL at 00:00

## 2021-05-10 RX ADMIN — ENOXAPARIN SODIUM 40 MILLIGRAM(S): 100 INJECTION SUBCUTANEOUS at 12:30

## 2021-05-10 NOTE — PROGRESS NOTE ADULT - PROBLEM SELECTOR PLAN 2
c/w dexamethasone and remdesivir   - monitor LFTs now stabilizing, continue to monitor   - currently satting well at 6L, 95%, wean off as tolerated  - desatted overnight 88% improved with proning
c/w dexamethasone   - s/p remdesivir   - monitor LFTs now stabilizing, continue to monitor   - Patient unable to tolerate Hiflow NC desatt to 87-88%, d/w respiratory therapist placed on NRB improved satt to 92%, Patient appears comfortable on NRB  - proning PRN
c/w dexamethasone   - s/p remdesivir   - monitor LFTs now stabilizing, continue to monitor   - d/c NRB  - place on Hiflo NC c/w 60%/ 60L
- continue dexamethasone   - finished remdesivir course  - monitor LFTs now stabilizing, continue to monitor   - continue oxygen supplementation, titrate to o2 sats >92%  - trend inflammatory markers
c/w dexamethasone   - s/p remdesivir   - monitor LFTs now stabilizing, continue to monitor   - Patient unable to tolerate Hiflow NC desatt to 87-88%, improved with NRB to 95%, Patient appears comfortable on NRB  - proning PRN
c/w dexamethasone and remdesivir   - monitor LFTs mildly uptrending, will d/c remdesivir if worsens   - c/w oxygen, currently satting well at 2L, 95%, wean off as tolerated
c/w dexamethasone and remdesivir   - monitor LFTs now stabilizing, continue to monitor   - c/w oxygen, currently satting well at 3L, 95%, wean off as tolerated

## 2021-05-10 NOTE — PROGRESS NOTE ADULT - PROBLEM SELECTOR PLAN 7
hold metformin  check A1c: 7.2  sliding scale
hold metformin  check A1c: 7.2  sliding scale
hold metformin  check A1c: 7.2  continue insulin sliding scale  monitor fingersticks ACHS
hold metformin  check A1c: 7.2  sliding scale

## 2021-05-10 NOTE — DIETITIAN INITIAL EVALUATION ADULT. - ORAL INTAKE PTA/DIET HISTORY
Pt reports Hx of eating well with no known changes in appetite. No known therapeutic diet. Unable to obtain food recall. No known food allergies. No known chewing or swallowing issues. No known micronutrient or oral nutrient supplement use.

## 2021-05-10 NOTE — DIETITIAN INITIAL EVALUATION ADULT. - PERTINENT LABORATORY DATA
05-09 Na 142 mmol/L Glu 138 mg/dL<H> K+ 4.4 mmol/L Cr  0.81 mg/dL BUN 29 mg/dL<H> Alb 3.0 g/dL<L> Hgb 14.9 g/dL Hct 45.6 %  A1C with Estimated Average Glucose Result: 7.2 % (05-04-21 @ 08:55)  CAPILLARY BLOOD GLUCOSE  POCT Blood Glucose.: 165 mg/dL (10 May 2021 08:03)  POCT Blood Glucose.: 198 mg/dL (09 May 2021 21:21)  POCT Blood Glucose.: 262 mg/dL (09 May 2021 16:55)  POCT Blood Glucose.: 192 mg/dL (09 May 2021 12:29)  -Elevated blood glucose noted, pt Hx of T2DM and on Dexamethasone.

## 2021-05-10 NOTE — DIETITIAN INITIAL EVALUATION ADULT. - REASON INDICATOR FOR ASSESSMENT
Pt seen for length of stay.  Source: Medical record, RN, and limited information from pt (Welsh speaking,  offer and declined; Emergency contact didn't answer phone call)

## 2021-05-10 NOTE — DIETITIAN INITIAL EVALUATION ADULT. - CONTINUE CURRENT NUTRITION CARE PLAN
1) Continue current consistent carbohydrate, DASH diet. RD remains available for diet changes as needed/able./yes

## 2021-05-10 NOTE — PROGRESS NOTE ADULT - PROBLEM SELECTOR PLAN 4
resolved   monitor BMP closely  hold losartan for now, BP soft
resolved   monitor BMP closely  encourage po intake, especially fluids
resolved   monitor BMP closely  hold losartan for now, BP soft
resolved cr 0.83  monitor BMP closely  hold losartan for now, BP soft
resolved   monitor BMP closely  hold losartan for now, BP soft

## 2021-05-10 NOTE — DIETITIAN INITIAL EVALUATION ADULT. - ADD RECOMMEND
3) Obtain additional subjective information as able. 4) Continue to trend labs, weight, skin integrity, and intake.

## 2021-05-10 NOTE — DIETITIAN INITIAL EVALUATION ADULT. - REASON
Nutrition focused physical exam deferred by pt, however visually pt appears well nourished with no overt signs of fat or muscle wasting.

## 2021-05-10 NOTE — DIETITIAN INITIAL EVALUATION ADULT. - OTHER INFO
Pt with Hx of Type 2 diabetes; manages with metformin per H&P. Unknown if pt is checking blood sugars. Recent A1C 7.2% (5/4), indicating fair glycemic control.    Dosing wt: 210.1 lbs. Pt unable to provide UBW. No other wts to address. RD will continue to trend as new wts available/able.     Pt is currently eating well with no changes in appetite. Unknown if pt is accepting Diet Might Shakes. No known recent N/V, diarrhea, or constipation. Last BM 5/8.

## 2021-05-10 NOTE — PROGRESS NOTE ADULT - PROBLEM SELECTOR PROBLEM 3
Elevated liver enzymes

## 2021-05-10 NOTE — DIETITIAN INITIAL EVALUATION ADULT. - PERTINENT MEDS FT
insulin lispro (ADMELOG) corrective regimen sliding scale   SubCutaneous three times a day before meals  insulin lispro (ADMELOG) corrective regimen sliding scale   SubCutaneous at bedtime  atorvastatin, dexAMETHasone

## 2021-05-10 NOTE — PROGRESS NOTE ADULT - PROBLEM SELECTOR PLAN 8
lovenox    d/ursula Medicine JAY Johnson
lovenox    d/ursula Medicine NP Gregory
lovenox    d/ursula Medicine JAY Wen
DVT PPX: Lovenox    Daughter in law Myla updated on the phone    Case and plan discussed with ACP:  Michele
lovenox    d/ursula Medicine NP Maryse
lovenox    d/ursula Medicine JAY Kitchen
lovenox    d/ursula Medicine JAY Smith

## 2021-05-10 NOTE — PROGRESS NOTE ADULT - PROBLEM/PLAN-5
DISPLAY PLAN FREE TEXT
29.5
DISPLAY PLAN FREE TEXT

## 2021-05-10 NOTE — PROGRESS NOTE ADULT - SUBJECTIVE AND OBJECTIVE BOX
[facility] Division of Hospital Medicine  Kwesi More MD  Pager ([place], 3G-3E): [phone]  Other Times:  [phone]    Patient is a 60y old  Male who presents with a chief complaint of shortness of breath (10 May 2021 10:48)      SUBJECTIVE / OVERNIGHT EVENTS:    Patient was examined this morning. Patient reports having cough and some chest discomfort. He is breathing comfortably on HFNC. Denies headache, dizziness, palpitations N/V, abd pain, diarrhea, pain/numbness/swelling in extremities.       ADDITIONAL REVIEW OF SYSTEMS:    MEDICATIONS  (STANDING):  atorvastatin 20 milliGRAM(s) Oral at bedtime  dexAMETHasone  Injectable 6 milliGRAM(s) IV Push daily  dextrose 40% Gel 15 Gram(s) Oral once  dextrose 5%. 1000 milliLiter(s) (50 mL/Hr) IV Continuous <Continuous>  dextrose 5%. 1000 milliLiter(s) (100 mL/Hr) IV Continuous <Continuous>  dextrose 50% Injectable 25 Gram(s) IV Push once  dextrose 50% Injectable 12.5 Gram(s) IV Push once  dextrose 50% Injectable 25 Gram(s) IV Push once  enoxaparin Injectable 40 milliGRAM(s) SubCutaneous daily  glucagon  Injectable 1 milliGRAM(s) IntraMuscular once  insulin lispro (ADMELOG) corrective regimen sliding scale   SubCutaneous three times a day before meals  insulin lispro (ADMELOG) corrective regimen sliding scale   SubCutaneous at bedtime  lidocaine   Patch 1 Patch Transdermal daily  potassium chloride    Tablet ER 40 milliEquivalent(s) Oral once    MEDICATIONS  (PRN):  acetaminophen   Tablet .. 650 milliGRAM(s) Oral every 6 hours PRN Temp greater or equal to 38C (100.4F), Mild Pain (1 - 3)  ALBUTerol    90 MICROgram(s) HFA Inhaler 1 Puff(s) Inhalation every 4 hours PRN Shortness of Breath  benzonatate 100 milliGRAM(s) Oral every 8 hours PRN Cough  guaiFENesin Oral Liquid (Sugar-Free) 200 milliGRAM(s) Oral every 6 hours PRN Cough      CAPILLARY BLOOD GLUCOSE      POCT Blood Glucose.: 220 mg/dL (10 May 2021 12:10)  POCT Blood Glucose.: 165 mg/dL (10 May 2021 08:03)  POCT Blood Glucose.: 198 mg/dL (09 May 2021 21:21)  POCT Blood Glucose.: 262 mg/dL (09 May 2021 16:55)    I&O's Summary    09 May 2021 07:01  -  10 May 2021 07:00  --------------------------------------------------------  IN: 240 mL / OUT: 1450 mL / NET: -1210 mL        PHYSICAL EXAM:  Vital Signs Last 24 Hrs  T(C): 36.8 (10 May 2021 13:13), Max: 37.7 (10 May 2021 04:55)  T(F): 98.3 (10 May 2021 13:13), Max: 99.8 (10 May 2021 04:55)  HR: 99 (10 May 2021 13:13) (71 - 99)  BP: 106/53 (10 May 2021 13:13) (106/53 - 129/66)  BP(mean): --  RR: 20 (10 May 2021 13:13) (20 - 26)  SpO2: 93% (10 May 2021 13:13) (81% - 99%)      PHYSICAL EXAM:  GENERAL: NAD, well-developed   HEAD:  Atraumatic, Normocephalic  EYES: EOMI, PERRL, conjunctiva and sclera clear  NECK: Supple, No JVD  CHEST/LUNG: coarse breath sounds; No wheeze  HEART: Regular rate and rhythm; No murmurs, rubs, or gallops  ABDOMEN: Soft, Nontender, Nondistended; Bowel sounds present  EXTREMITIES:  2+ Peripheral Pulses, No clubbing, cyanosis, or edema  NEUROLOGY: AAOx3, non-focal  PSYCH: calm  SKIN: whitish scaling plaques on knees, legs BL      LABS:                        14.9   15.69 )-----------( 413      ( 09 May 2021 06:33 )             45.6     05-10    x   |  x   |  x   ----------------------------<  x   x    |  x   |  0.80    Ca    8.7      09 May 2021 06:33    TPro  6.0  /  Alb  3.0<L>  /  TBili  0.6  /  DBili  0.2  /  AST  33  /  ALT  63<H>  /  AlkPhos  94  05-10    PT/INR - ( 10 May 2021 11:01 )   PT: 13.2 sec;   INR: 1.11 ratio                     RADIOLOGY & ADDITIONAL TESTS:  Results Reviewed:   Imaging Personally Reviewed:  Electrocardiogram Personally Reviewed:    COORDINATION OF CARE:  Care Discussed with Consultants/Other Providers [Y/N]:  Prior or Outpatient Records Reviewed [Y/N]:

## 2021-05-10 NOTE — PROGRESS NOTE ADULT - PROBLEM SELECTOR PLAN 5
hold BP soft   restart home meds well able
hold BP soft   restart home meds well able
holding losartan   restart home meds when appropriate
hold BP soft   restart home meds well able
hold BP soft   restart well able
hold BP soft   restart home meds well able
hold BP soft   restart home meds well able

## 2021-05-10 NOTE — PROGRESS NOTE ADULT - PROBLEM SELECTOR PLAN 3
Likely 2/2 to NAFLD, now stable  will monitor LFTs for now while patient is on remdesivir  if LFTs worsening, will check US abdomen
Likely 2/2 to NAFLD, now stable
Likely 2/2 to NAFLD, now stable  will monitor LFTs for now while patient is on remdesivir  if LFTs worsening, will check US abdomen
Likely 2/2 to NAFLD, now stable
Likely 2/2 to NAFLD  will monitor LFTs for now while patient is on remdesivir  if LFTs worsening, will check US abdomen
Likely 2/2 to NAFLD  will monitor LFTs for now while patient is on remdesivir  if LFTs worsening, will check US abdomen
Likely 2/2 to NAFLD, now stable

## 2021-05-10 NOTE — DIETITIAN INITIAL EVALUATION ADULT. - CHIEF COMPLAINT
The patient is a 60y Male with Hx of HTN, HLD, T2DM, presents here with SOB and hypoxia. Complaining of shortness of breath.

## 2021-05-11 LAB
ALBUMIN SERPL ELPH-MCNC: 3 G/DL — LOW (ref 3.3–5)
ALP SERPL-CCNC: 101 U/L — SIGNIFICANT CHANGE UP (ref 40–120)
ALT FLD-CCNC: 61 U/L — HIGH (ref 10–45)
ANION GAP SERPL CALC-SCNC: 16 MMOL/L — SIGNIFICANT CHANGE UP (ref 5–17)
APPEARANCE UR: ABNORMAL
APTT BLD: 27.9 SEC — SIGNIFICANT CHANGE UP (ref 27.5–35.5)
AST SERPL-CCNC: 35 U/L — SIGNIFICANT CHANGE UP (ref 10–40)
BACTERIA # UR AUTO: NEGATIVE — SIGNIFICANT CHANGE UP
BASOPHILS # BLD AUTO: 0 K/UL — SIGNIFICANT CHANGE UP (ref 0–0.2)
BASOPHILS NFR BLD AUTO: 0 % — SIGNIFICANT CHANGE UP (ref 0–2)
BILIRUB SERPL-MCNC: 1.1 MG/DL — SIGNIFICANT CHANGE UP (ref 0.2–1.2)
BILIRUB UR-MCNC: NEGATIVE — SIGNIFICANT CHANGE UP
BUN SERPL-MCNC: 32 MG/DL — HIGH (ref 7–23)
CALCIUM SERPL-MCNC: 8.4 MG/DL — SIGNIFICANT CHANGE UP (ref 8.4–10.5)
CHLORIDE SERPL-SCNC: 102 MMOL/L — SIGNIFICANT CHANGE UP (ref 96–108)
CO2 SERPL-SCNC: 19 MMOL/L — LOW (ref 22–31)
COLOR SPEC: YELLOW — SIGNIFICANT CHANGE UP
CREAT SERPL-MCNC: 0.71 MG/DL — SIGNIFICANT CHANGE UP (ref 0.5–1.3)
D DIMER BLD IA.RAPID-MCNC: 1143 NG/ML DDU — HIGH
DIFF PNL FLD: NEGATIVE — SIGNIFICANT CHANGE UP
ELLIPTOCYTES BLD QL SMEAR: SLIGHT — SIGNIFICANT CHANGE UP
EOSINOPHIL # BLD AUTO: 0 K/UL — SIGNIFICANT CHANGE UP (ref 0–0.5)
EOSINOPHIL NFR BLD AUTO: 0 % — SIGNIFICANT CHANGE UP (ref 0–6)
EPI CELLS # UR: 4 /HPF — SIGNIFICANT CHANGE UP
FERRITIN SERPL-MCNC: 905 NG/ML — HIGH (ref 30–400)
FIBRINOGEN PPP-MCNC: 766 MG/DL — HIGH (ref 290–520)
GAS PNL BLDA: SIGNIFICANT CHANGE UP
GLUCOSE BLDC GLUCOMTR-MCNC: 115 MG/DL — HIGH (ref 70–99)
GLUCOSE BLDC GLUCOMTR-MCNC: 191 MG/DL — HIGH (ref 70–99)
GLUCOSE BLDC GLUCOMTR-MCNC: 238 MG/DL — HIGH (ref 70–99)
GLUCOSE BLDC GLUCOMTR-MCNC: 278 MG/DL — HIGH (ref 70–99)
GLUCOSE SERPL-MCNC: 164 MG/DL — HIGH (ref 70–99)
GLUCOSE UR QL: ABNORMAL
HCT VFR BLD CALC: 44.7 % — SIGNIFICANT CHANGE UP (ref 39–50)
HCT VFR BLD CALC: 45.2 % — SIGNIFICANT CHANGE UP (ref 39–50)
HCT VFR BLD CALC: 45.2 % — SIGNIFICANT CHANGE UP (ref 39–50)
HGB BLD-MCNC: 14.7 G/DL — SIGNIFICANT CHANGE UP (ref 13–17)
HGB BLD-MCNC: 14.9 G/DL — SIGNIFICANT CHANGE UP (ref 13–17)
HGB BLD-MCNC: 15 G/DL — SIGNIFICANT CHANGE UP (ref 13–17)
HYALINE CASTS # UR AUTO: 38 /LPF — HIGH (ref 0–2)
INR BLD: 1.07 RATIO — SIGNIFICANT CHANGE UP (ref 0.88–1.16)
KETONES UR-MCNC: NEGATIVE — SIGNIFICANT CHANGE UP
LDH SERPL L TO P-CCNC: 397 U/L — HIGH (ref 50–242)
LEUKOCYTE ESTERASE UR-ACNC: NEGATIVE — SIGNIFICANT CHANGE UP
LYMPHOCYTES # BLD AUTO: 0.25 K/UL — LOW (ref 1–3.3)
LYMPHOCYTES # BLD AUTO: 0.9 % — LOW (ref 13–44)
MAGNESIUM SERPL-MCNC: 1.8 MG/DL — SIGNIFICANT CHANGE UP (ref 1.6–2.6)
MANUAL SMEAR VERIFICATION: SIGNIFICANT CHANGE UP
MCHC RBC-ENTMCNC: 28.5 PG — SIGNIFICANT CHANGE UP (ref 27–34)
MCHC RBC-ENTMCNC: 28.5 PG — SIGNIFICANT CHANGE UP (ref 27–34)
MCHC RBC-ENTMCNC: 29 PG — SIGNIFICANT CHANGE UP (ref 27–34)
MCHC RBC-ENTMCNC: 32.5 GM/DL — SIGNIFICANT CHANGE UP (ref 32–36)
MCHC RBC-ENTMCNC: 33 GM/DL — SIGNIFICANT CHANGE UP (ref 32–36)
MCHC RBC-ENTMCNC: 33.6 GM/DL — SIGNIFICANT CHANGE UP (ref 32–36)
MCV RBC AUTO: 86.4 FL — SIGNIFICANT CHANGE UP (ref 80–100)
MCV RBC AUTO: 86.5 FL — SIGNIFICANT CHANGE UP (ref 80–100)
MCV RBC AUTO: 87.8 FL — SIGNIFICANT CHANGE UP (ref 80–100)
MONOCYTES # BLD AUTO: 1.69 K/UL — HIGH (ref 0–0.9)
MONOCYTES NFR BLD AUTO: 6 % — SIGNIFICANT CHANGE UP (ref 2–14)
MRSA PCR RESULT.: SIGNIFICANT CHANGE UP
NEUTROPHILS # BLD AUTO: 26.24 K/UL — HIGH (ref 1.8–7.4)
NEUTROPHILS NFR BLD AUTO: 93.1 % — HIGH (ref 43–77)
NITRITE UR-MCNC: NEGATIVE — SIGNIFICANT CHANGE UP
NRBC # BLD: 0 /100 WBCS — SIGNIFICANT CHANGE UP (ref 0–0)
NRBC # BLD: 0 /100 WBCS — SIGNIFICANT CHANGE UP (ref 0–0)
PH UR: 6 — SIGNIFICANT CHANGE UP (ref 5–8)
PHOSPHATE SERPL-MCNC: 3.2 MG/DL — SIGNIFICANT CHANGE UP (ref 2.5–4.5)
PLAT MORPH BLD: NORMAL — SIGNIFICANT CHANGE UP
PLATELET # BLD AUTO: 397 K/UL — SIGNIFICANT CHANGE UP (ref 150–400)
PLATELET # BLD AUTO: 442 K/UL — HIGH (ref 150–400)
PLATELET # BLD AUTO: 529 K/UL — HIGH (ref 150–400)
POIKILOCYTOSIS BLD QL AUTO: SLIGHT — SIGNIFICANT CHANGE UP
POLYCHROMASIA BLD QL SMEAR: SLIGHT — SIGNIFICANT CHANGE UP
POTASSIUM SERPL-MCNC: 4.1 MMOL/L — SIGNIFICANT CHANGE UP (ref 3.5–5.3)
POTASSIUM SERPL-SCNC: 4.1 MMOL/L — SIGNIFICANT CHANGE UP (ref 3.5–5.3)
PROCALCITONIN SERPL-MCNC: 0.11 NG/ML — HIGH (ref 0.02–0.1)
PROT SERPL-MCNC: 6.2 G/DL — SIGNIFICANT CHANGE UP (ref 6–8.3)
PROT UR-MCNC: ABNORMAL
PROTHROM AB SERPL-ACNC: 12.8 SEC — SIGNIFICANT CHANGE UP (ref 10.6–13.6)
RBC # BLD: 5.15 M/UL — SIGNIFICANT CHANGE UP (ref 4.2–5.8)
RBC # BLD: 5.17 M/UL — SIGNIFICANT CHANGE UP (ref 4.2–5.8)
RBC # BLD: 5.23 M/UL — SIGNIFICANT CHANGE UP (ref 4.2–5.8)
RBC # FLD: 12 % — SIGNIFICANT CHANGE UP (ref 10.3–14.5)
RBC # FLD: 12 % — SIGNIFICANT CHANGE UP (ref 10.3–14.5)
RBC # FLD: 12.1 % — SIGNIFICANT CHANGE UP (ref 10.3–14.5)
RBC BLD AUTO: ABNORMAL
RBC CASTS # UR COMP ASSIST: 4 /HPF — SIGNIFICANT CHANGE UP (ref 0–4)
S AUREUS DNA NOSE QL NAA+PROBE: DETECTED
SMUDGE CELLS # BLD: PRESENT — SIGNIFICANT CHANGE UP
SODIUM SERPL-SCNC: 137 MMOL/L — SIGNIFICANT CHANGE UP (ref 135–145)
SP GR SPEC: 1.03 — HIGH (ref 1.01–1.02)
UROBILINOGEN FLD QL: ABNORMAL
WBC # BLD: 17.8 K/UL — HIGH (ref 3.8–10.5)
WBC # BLD: 21.5 K/UL — HIGH (ref 3.8–10.5)
WBC # BLD: 28.18 K/UL — HIGH (ref 3.8–10.5)
WBC # FLD AUTO: 17.8 K/UL — HIGH (ref 3.8–10.5)
WBC # FLD AUTO: 21.5 K/UL — HIGH (ref 3.8–10.5)
WBC # FLD AUTO: 28.18 K/UL — HIGH (ref 3.8–10.5)
WBC UR QL: 3 /HPF — SIGNIFICANT CHANGE UP (ref 0–5)

## 2021-05-11 PROCEDURE — 36556 INSERT NON-TUNNEL CV CATH: CPT

## 2021-05-11 PROCEDURE — 99291 CRITICAL CARE FIRST HOUR: CPT | Mod: 25

## 2021-05-11 PROCEDURE — 36620 INSERTION CATHETER ARTERY: CPT

## 2021-05-11 PROCEDURE — 71045 X-RAY EXAM CHEST 1 VIEW: CPT | Mod: 26

## 2021-05-11 RX ORDER — CISATRACURIUM BESYLATE 2 MG/ML
2 INJECTION INTRAVENOUS
Qty: 200 | Refills: 0 | Status: DISCONTINUED | OUTPATIENT
Start: 2021-05-11 | End: 2021-05-12

## 2021-05-11 RX ORDER — CHLORHEXIDINE GLUCONATE 213 G/1000ML
15 SOLUTION TOPICAL EVERY 12 HOURS
Refills: 0 | Status: DISCONTINUED | OUTPATIENT
Start: 2021-05-11 | End: 2021-05-13

## 2021-05-11 RX ORDER — INSULIN LISPRO 100/ML
VIAL (ML) SUBCUTANEOUS EVERY 6 HOURS
Refills: 0 | Status: DISCONTINUED | OUTPATIENT
Start: 2021-05-11 | End: 2021-05-21

## 2021-05-11 RX ORDER — FENTANYL CITRATE 50 UG/ML
2 INJECTION INTRAVENOUS
Qty: 5000 | Refills: 0 | Status: DISCONTINUED | OUTPATIENT
Start: 2021-05-11 | End: 2021-05-12

## 2021-05-11 RX ORDER — CISATRACURIUM BESYLATE 2 MG/ML
20 INJECTION INTRAVENOUS ONCE
Refills: 0 | Status: COMPLETED | OUTPATIENT
Start: 2021-05-11 | End: 2021-05-11

## 2021-05-11 RX ORDER — MIDAZOLAM HYDROCHLORIDE 1 MG/ML
4 INJECTION, SOLUTION INTRAMUSCULAR; INTRAVENOUS ONCE
Refills: 0 | Status: DISCONTINUED | OUTPATIENT
Start: 2021-05-11 | End: 2021-05-11

## 2021-05-11 RX ORDER — CHLORHEXIDINE GLUCONATE 213 G/1000ML
1 SOLUTION TOPICAL
Refills: 0 | Status: DISCONTINUED | OUTPATIENT
Start: 2021-05-11 | End: 2021-05-21

## 2021-05-11 RX ORDER — ACETAMINOPHEN 500 MG
1000 TABLET ORAL ONCE
Refills: 0 | Status: COMPLETED | OUTPATIENT
Start: 2021-05-11 | End: 2021-05-11

## 2021-05-11 RX ORDER — PROPOFOL 10 MG/ML
50 INJECTION, EMULSION INTRAVENOUS
Qty: 1000 | Refills: 0 | Status: DISCONTINUED | OUTPATIENT
Start: 2021-05-11 | End: 2021-05-13

## 2021-05-11 RX ORDER — NOREPINEPHRINE BITARTRATE/D5W 8 MG/250ML
0.05 PLASTIC BAG, INJECTION (ML) INTRAVENOUS
Qty: 8 | Refills: 0 | Status: DISCONTINUED | OUTPATIENT
Start: 2021-05-11 | End: 2021-05-13

## 2021-05-11 RX ADMIN — ATORVASTATIN CALCIUM 20 MILLIGRAM(S): 80 TABLET, FILM COATED ORAL at 23:02

## 2021-05-11 RX ADMIN — CISATRACURIUM BESYLATE 20 MILLIGRAM(S): 2 INJECTION INTRAVENOUS at 04:53

## 2021-05-11 RX ADMIN — Medication 1000 MILLIGRAM(S): at 09:15

## 2021-05-11 RX ADMIN — FENTANYL CITRATE 9.53 MICROGRAM(S)/KG/HR: 50 INJECTION INTRAVENOUS at 04:54

## 2021-05-11 RX ADMIN — Medication 200 MILLIGRAM(S): at 02:45

## 2021-05-11 RX ADMIN — CHLORHEXIDINE GLUCONATE 15 MILLILITER(S): 213 SOLUTION TOPICAL at 17:14

## 2021-05-11 RX ADMIN — Medication 400 MILLIGRAM(S): at 08:43

## 2021-05-11 RX ADMIN — PROPOFOL 28.6 MICROGRAM(S)/KG/MIN: 10 INJECTION, EMULSION INTRAVENOUS at 11:57

## 2021-05-11 RX ADMIN — PROPOFOL 28.6 MICROGRAM(S)/KG/MIN: 10 INJECTION, EMULSION INTRAVENOUS at 08:37

## 2021-05-11 RX ADMIN — CHLORHEXIDINE GLUCONATE 15 MILLILITER(S): 213 SOLUTION TOPICAL at 06:20

## 2021-05-11 RX ADMIN — CISATRACURIUM BESYLATE 11.4 MICROGRAM(S)/KG/MIN: 2 INJECTION INTRAVENOUS at 04:55

## 2021-05-11 RX ADMIN — MIDAZOLAM HYDROCHLORIDE 4 MILLIGRAM(S): 1 INJECTION, SOLUTION INTRAMUSCULAR; INTRAVENOUS at 04:53

## 2021-05-11 RX ADMIN — ENOXAPARIN SODIUM 40 MILLIGRAM(S): 100 INJECTION SUBCUTANEOUS at 11:39

## 2021-05-11 RX ADMIN — Medication 8.93 MICROGRAM(S)/KG/MIN: at 04:55

## 2021-05-11 RX ADMIN — PROPOFOL 28.6 MICROGRAM(S)/KG/MIN: 10 INJECTION, EMULSION INTRAVENOUS at 04:55

## 2021-05-11 RX ADMIN — Medication 6 MILLIGRAM(S): at 06:24

## 2021-05-11 RX ADMIN — Medication 6: at 11:41

## 2021-05-11 RX ADMIN — Medication 4: at 17:17

## 2021-05-11 RX ADMIN — CHLORHEXIDINE GLUCONATE 1 APPLICATION(S): 213 SOLUTION TOPICAL at 06:20

## 2021-05-11 RX ADMIN — PROPOFOL 28.6 MICROGRAM(S)/KG/MIN: 10 INJECTION, EMULSION INTRAVENOUS at 16:34

## 2021-05-11 RX ADMIN — Medication 2: at 23:25

## 2021-05-11 NOTE — CHART NOTE - NSCHARTNOTEFT_GEN_A_CORE
COVID ICU Accept Note  ---------------------------  Transfer from: PICU   Accepted by: [facility] ICU  Accepting Physician: Dr THOMPSON COURSE      OBJECTIVE --  Vital Signs Last 24 Hrs  T(C): 37.2 (11 May 2021 04:00), Max: 37.2 (11 May 2021 04:00)  T(F): 99 (11 May 2021 04:00), Max: 99 (11 May 2021 04:00)  HR: 84 (11 May 2021 05:15) (69 - 106)  BP: 117/69 (11 May 2021 05:15) (78/57 - 140/88)  BP(mean): 87 (11 May 2021 05:15) (64 - 108)  RR: 34 (11 May 2021 05:15) (20 - 36)  SpO2: 92% (11 May 2021 05:15) (86% - 99%)    I&O's Summary    09 May 2021 07:01  -  10 May 2021 07:00  --------------------------------------------------------  IN: 240 mL / OUT: 1450 mL / NET: -1210 mL    10 May 2021 07:01  -  11 May 2021 05:35  --------------------------------------------------------  IN: 0 mL / OUT: 1250 mL / NET: -1250 mL        MEDICATIONS  (STANDING):  atorvastatin 20 milliGRAM(s) Oral at bedtime  benzonatate 100 milliGRAM(s) Oral three times a day  chlorhexidine 0.12% Liquid 15 milliLiter(s) Oral Mucosa every 12 hours  chlorhexidine 4% Liquid 1 Application(s) Topical <User Schedule>  cisatracurium Infusion 2 MICROgram(s)/kG/Min (11.4 mL/Hr) IV Continuous <Continuous>  dexAMETHasone  Injectable 6 milliGRAM(s) IV Push daily  enoxaparin Injectable 40 milliGRAM(s) SubCutaneous daily  fentaNYL   Infusion... 2 MICROgram(s)/kG/Hr (9.53 mL/Hr) IV Continuous <Continuous>  insulin lispro (ADMELOG) corrective regimen sliding scale   SubCutaneous three times a day before meals  insulin lispro (ADMELOG) corrective regimen sliding scale   SubCutaneous at bedtime  lidocaine   Patch 1 Patch Transdermal daily  norepinephrine Infusion 0.05 MICROgram(s)/kG/Min (8.93 mL/Hr) IV Continuous <Continuous>  propofol Infusion 50 MICROgram(s)/kG/Min (28.6 mL/Hr) IV Continuous <Continuous>    MEDICATIONS  (PRN):  acetaminophen   Tablet .. 650 milliGRAM(s) Oral every 6 hours PRN Temp greater or equal to 38C (100.4F), Mild Pain (1 - 3)  ALBUTerol    90 MICROgram(s) HFA Inhaler 1 Puff(s) Inhalation every 4 hours PRN Shortness of Breath  guaiFENesin Oral Liquid (Sugar-Free) 200 milliGRAM(s) Oral every 6 hours PRN Cough      LABS                                            14.9                  Neurophils% (auto):   x      (05-11 @ 05:18):    28.18)-----------(529          Lymphocytes% (auto):  x                                             45.2                   Eosinphils% (auto):   x        Manual%: Neutrophils x    ; Lymphocytes x    ; Eosinophils x    ; Bands%: x    ; Blasts x                                    x      |  x      |  x                   Calcium: x     / iCa: x      (05-10 @ 11:01)    ----------------------------<  x         Magnesium: x                                x       |  x      |  0.80             Phosphorous: x        TPro  6.0    /  Alb  3.0    /  TBili  0.6    /  DBili  0.2    /  AST  33     /  ALT  63     /  AlkPhos  94     10 May 2021 11:01    ( 05-10 @ 11:01 )   PT: 13.2 sec;   INR: 1.11 ratio  aPTT: x          ASSESSMENT & PLAN:   60y-year-old Male with a past medical history of ______ , admitted for acute respiratory failure secondary to COVID-19, now with _____, admitted to ICU for further assessment and management.     NEUROLOGY:  Sedation  - Continue sedation with Propofol and Fentanyl.   - Start Nimbex.     PULMONARY:  Acute Respiratory Failure  - Vent Settings: Volume __  - Titrate FiO2 and PEEP as tolerated.     CARDIOLOGY:  Hypotension  - Levophed to maintain MAP > 65.     GASTROINTESTINAL:  - Start tube feeds and advance towards goal.   - Formal nutrition consult in am.     RENAL/:  - Boss  - Strict I&Os.     INFECTIOUS DISEASE:  -   -  COVID-19  - Continue with Remdesvmir and Dexamethasone to fully complete course (    HEMATOLOGY:  - VTE prophylaxis with Lovenox   -      ENDOCRINE  - Glucose checks Q6 while on tube feeds.   - SSI  - Add NPH as needed.     ETHICS/DISPOSITION:  - Full code.   - Transfer to [facility] ICU .     LINES/DRAINS/TUBES/DEVICES:  -   -    Above assesment and plan of care performed, created, and reviewed in real time with ICU attending physician  ____. COVID ICU Accept Note  ---------------------------  Transfer from: PICU   Accepted by: [facility] ICU  Accepting Physician: Dr Camarillo, [facility] COURSE  60YOM  with PMH of HTN, HLD, T2DM, presents here with SOB and hypoxia secondary to COVID-19 infection.       5/11: RRT was called for persistent hypoxia in the 70-80 while on bipap. Pt intubated and brought to 5 ICU          OBJECTIVE --  Vital Signs Last 24 Hrs  T(C): 37.2 (11 May 2021 04:00), Max: 37.2 (11 May 2021 04:00)  T(F): 99 (11 May 2021 04:00), Max: 99 (11 May 2021 04:00)  HR: 84 (11 May 2021 05:15) (69 - 106)  BP: 117/69 (11 May 2021 05:15) (78/57 - 140/88)  BP(mean): 87 (11 May 2021 05:15) (64 - 108)  RR: 34 (11 May 2021 05:15) (20 - 36)  SpO2: 92% (11 May 2021 05:15) (86% - 99%)    I&O's Summary    09 May 2021 07:01  -  10 May 2021 07:00  --------------------------------------------------------  IN: 240 mL / OUT: 1450 mL / NET: -1210 mL    10 May 2021 07:01  -  11 May 2021 05:35  --------------------------------------------------------  IN: 0 mL / OUT: 1250 mL / NET: -1250 mL        MEDICATIONS  (STANDING):  atorvastatin 20 milliGRAM(s) Oral at bedtime  benzonatate 100 milliGRAM(s) Oral three times a day  chlorhexidine 0.12% Liquid 15 milliLiter(s) Oral Mucosa every 12 hours  chlorhexidine 4% Liquid 1 Application(s) Topical <User Schedule>  cisatracurium Infusion 2 MICROgram(s)/kG/Min (11.4 mL/Hr) IV Continuous <Continuous>  dexAMETHasone  Injectable 6 milliGRAM(s) IV Push daily  enoxaparin Injectable 40 milliGRAM(s) SubCutaneous daily  fentaNYL   Infusion... 2 MICROgram(s)/kG/Hr (9.53 mL/Hr) IV Continuous <Continuous>  insulin lispro (ADMELOG) corrective regimen sliding scale   SubCutaneous three times a day before meals  insulin lispro (ADMELOG) corrective regimen sliding scale   SubCutaneous at bedtime  lidocaine   Patch 1 Patch Transdermal daily  norepinephrine Infusion 0.05 MICROgram(s)/kG/Min (8.93 mL/Hr) IV Continuous <Continuous>  propofol Infusion 50 MICROgram(s)/kG/Min (28.6 mL/Hr) IV Continuous <Continuous>    MEDICATIONS  (PRN):  acetaminophen   Tablet .. 650 milliGRAM(s) Oral every 6 hours PRN Temp greater or equal to 38C (100.4F), Mild Pain (1 - 3)  ALBUTerol    90 MICROgram(s) HFA Inhaler 1 Puff(s) Inhalation every 4 hours PRN Shortness of Breath  guaiFENesin Oral Liquid (Sugar-Free) 200 milliGRAM(s) Oral every 6 hours PRN Cough      LABS                                            14.9                  Neurophils% (auto):   x      (05-11 @ 05:18):    28.18)-----------(529          Lymphocytes% (auto):  x                                             45.2                   Eosinphils% (auto):   x        Manual%: Neutrophils x    ; Lymphocytes x    ; Eosinophils x    ; Bands%: x    ; Blasts x                                    x      |  x      |  x                   Calcium: x     / iCa: x      (05-10 @ 11:01)    ----------------------------<  x         Magnesium: x                                x       |  x      |  0.80             Phosphorous: x        TPro  6.0    /  Alb  3.0    /  TBili  0.6    /  DBili  0.2    /  AST  33     /  ALT  63     /  AlkPhos  94     10 May 2021 11:01    ( 05-10 @ 11:01 )   PT: 13.2 sec;   INR: 1.11 ratio  aPTT: x          ASSESSMENT & PLAN:   60y-year-old Male with a past medical history of ______ , admitted for acute respiratory failure secondary to COVID-19, now with _____, admitted to ICU for further assessment and management.     NEUROLOGY:  Sedation  - Continue sedation with Propofol and Fentanyl.   - Start Nimbex.     PULMONARY:  Acute Respiratory Failure  - Vent Settings: Volume __  - Titrate FiO2 and PEEP as tolerated.     CARDIOLOGY:  Hypotension  - Levophed to maintain MAP > 65.     GASTROINTESTINAL:  - Start tube feeds and advance towards goal.   - Formal nutrition consult in am.     RENAL/:  - Boss  - Strict I&Os.     INFECTIOUS DISEASE:  -   -  COVID-19  - Continue with Remdesvmir and Dexamethasone to fully complete course (    HEMATOLOGY:  - VTE prophylaxis with Lovenox   -      ENDOCRINE  - Glucose checks Q6 while on tube feeds.   - SSI  - Add NPH as needed.     ETHICS/DISPOSITION:  - Full code.   - Transfer to [facility] ICU .     LINES/DRAINS/TUBES/DEVICES:  -   -    Above assesment and plan of care performed, created, and reviewed in real time with ICU attending physician  ____. COVID ICU Accept Note  ---------------------------  Transfer from: PICU   Accepted by: [facility] ICU  Accepting Physician: Dr Camarillo, [facility] COURSE  60YOM  with PMH of HTN, HLD, T2DM, presents here with SOB and hypoxia secondary to COVID-19 infection.    5/2 COVID (+)   5/3 Admit to [facility]  5/6: High-flow   5/7; NRB overnight- HFNC replaced 60/60   5/10: HFNC 60l/95% sat 92% - difficulty wean, later that night pt placed on BIPAP for desat to low 80s  5/11: RRT was called for persistent hypoxia in the 70-80 while on bipap. Pt intubated and brought to 5 ICU        OBJECTIVE --  Vital Signs Last 24 Hrs  T(C): 37.2 (11 May 2021 04:00), Max: 37.2 (11 May 2021 04:00)  T(F): 99 (11 May 2021 04:00), Max: 99 (11 May 2021 04:00)  HR: 84 (11 May 2021 05:15) (69 - 106)  BP: 117/69 (11 May 2021 05:15) (78/57 - 140/88)  BP(mean): 87 (11 May 2021 05:15) (64 - 108)  RR: 34 (11 May 2021 05:15) (20 - 36)  SpO2: 92% (11 May 2021 05:15) (86% - 99%)    I&O's Summary    09 May 2021 07:01  -  10 May 2021 07:00  --------------------------------------------------------  IN: 240 mL / OUT: 1450 mL / NET: -1210 mL    10 May 2021 07:01  -  11 May 2021 05:35  --------------------------------------------------------  IN: 0 mL / OUT: 1250 mL / NET: -1250 mL        MEDICATIONS  (STANDING):  atorvastatin 20 milliGRAM(s) Oral at bedtime  benzonatate 100 milliGRAM(s) Oral three times a day  chlorhexidine 0.12% Liquid 15 milliLiter(s) Oral Mucosa every 12 hours  chlorhexidine 4% Liquid 1 Application(s) Topical <User Schedule>  cisatracurium Infusion 2 MICROgram(s)/kG/Min (11.4 mL/Hr) IV Continuous <Continuous>  dexAMETHasone  Injectable 6 milliGRAM(s) IV Push daily  enoxaparin Injectable 40 milliGRAM(s) SubCutaneous daily  fentaNYL   Infusion... 2 MICROgram(s)/kG/Hr (9.53 mL/Hr) IV Continuous <Continuous>  insulin lispro (ADMELOG) corrective regimen sliding scale   SubCutaneous three times a day before meals  insulin lispro (ADMELOG) corrective regimen sliding scale   SubCutaneous at bedtime  lidocaine   Patch 1 Patch Transdermal daily  norepinephrine Infusion 0.05 MICROgram(s)/kG/Min (8.93 mL/Hr) IV Continuous <Continuous>  propofol Infusion 50 MICROgram(s)/kG/Min (28.6 mL/Hr) IV Continuous <Continuous>    MEDICATIONS  (PRN):  acetaminophen   Tablet .. 650 milliGRAM(s) Oral every 6 hours PRN Temp greater or equal to 38C (100.4F), Mild Pain (1 - 3)  ALBUTerol    90 MICROgram(s) HFA Inhaler 1 Puff(s) Inhalation every 4 hours PRN Shortness of Breath  guaiFENesin Oral Liquid (Sugar-Free) 200 milliGRAM(s) Oral every 6 hours PRN Cough      LABS                                            14.9                  Neurophils% (auto):   x      (05-11 @ 05:18):    28.18)-----------(529          Lymphocytes% (auto):  x                                             45.2                   Eosinphils% (auto):   x        Manual%: Neutrophils x    ; Lymphocytes x    ; Eosinophils x    ; Bands%: x    ; Blasts x                                    x      |  x      |  x                   Calcium: x     / iCa: x      (05-10 @ 11:01)    ----------------------------<  x         Magnesium: x                                x       |  x      |  0.80             Phosphorous: x        TPro  6.0    /  Alb  3.0    /  TBili  0.6    /  DBili  0.2    /  AST  33     /  ALT  63     /  AlkPhos  94     10 May 2021 11:01    ( 05-10 @ 11:01 )   PT: 13.2 sec;   INR: 1.11 ratio  aPTT: x          ASSESSMENT & PLAN:   60y-year-old Male with a past medical history of HTN, HLD, DMT2 admitted for acute respiratory failure secondary to COVID-19, now with hypoxic respiratory failure admitted to ICU for further assessment and management.     NEUROLOGY:  Sedation  - Continue sedation with Propofol and Fentanyl   - Start Nimbex.     PULMONARY:  Acute Respiratory Failure  - Vent Settings: Volume /10/450/32   - 1st Prone @ 6am 5/11   - Titrate FiO2 and PEEP as tolerated.     CARDIOLOGY:  Hypotension  - Levophed @ .05 to maintain MAP > 65.     GASTROINTESTINAL:  - Start tube feeds and advance towards goal.   - Formal nutrition consult in am.     RENAL/:  - Boss  - Strict I&Os.     INFECTIOUS DISEASE:  - No ABx for now   - follow up BCx sent on 5/11     COVID-19  - Completed remdesivir (5/3-5/7) Dexamethasone to finish on 5/4-     HEMATOLOGY:  - VTE prophylaxis with Lovenox     ENDOCRINE  - Glucose checks Q6 while on tube feeds.   - SSI  - Add NPH as needed.     ETHICS/DISPOSITION:  - Full code.   - Transfer to [facility] ICU .     LINES/DRAINS/TUBES/DEVICES:  - 5/11 OGT  - 5/11 RRAL, [facility] TLC         Above assessment and plan of care performed, created, and reviewed in real time with ICU attending physician Dr. Dr Camarillo Mars COVID ICU Accept Note  ---------------------------  Transfer from: PICU   Accepted by: [facility] ICU  Accepting Physician: Dr Camarillo, [facility] COURSE  60YOM  with PMH of HTN, HLD, T2DM, presents here with SOB and hypoxia secondary to COVID-19 infection.    5/2 COVID (+)   5/3 Admit to [facility]  5/6: High-flow   5/7; NRB overnight- HFNC replaced 60/60   5/10: HFNC 60l/95% sat 92% - difficulty wean, later that night pt placed on BIPAP for desat to low 80s  5/11: RRT was called for persistent hypoxia in the 70-80 while on bipap. Pt intubated and brought to 5 ICU        OBJECTIVE --  Vital Signs Last 24 Hrs  T(C): 37.2 (11 May 2021 04:00), Max: 37.2 (11 May 2021 04:00)  T(F): 99 (11 May 2021 04:00), Max: 99 (11 May 2021 04:00)  HR: 84 (11 May 2021 05:15) (69 - 106)  BP: 117/69 (11 May 2021 05:15) (78/57 - 140/88)  BP(mean): 87 (11 May 2021 05:15) (64 - 108)  RR: 34 (11 May 2021 05:15) (20 - 36)  SpO2: 92% (11 May 2021 05:15) (86% - 99%)    I&O's Summary    09 May 2021 07:01  -  10 May 2021 07:00  --------------------------------------------------------  IN: 240 mL / OUT: 1450 mL / NET: -1210 mL    10 May 2021 07:01  -  11 May 2021 05:35  --------------------------------------------------------  IN: 0 mL / OUT: 1250 mL / NET: -1250 mL        MEDICATIONS  (STANDING):  atorvastatin 20 milliGRAM(s) Oral at bedtime  benzonatate 100 milliGRAM(s) Oral three times a day  chlorhexidine 0.12% Liquid 15 milliLiter(s) Oral Mucosa every 12 hours  chlorhexidine 4% Liquid 1 Application(s) Topical <User Schedule>  cisatracurium Infusion 2 MICROgram(s)/kG/Min (11.4 mL/Hr) IV Continuous <Continuous>  dexAMETHasone  Injectable 6 milliGRAM(s) IV Push daily  enoxaparin Injectable 40 milliGRAM(s) SubCutaneous daily  fentaNYL   Infusion... 2 MICROgram(s)/kG/Hr (9.53 mL/Hr) IV Continuous <Continuous>  insulin lispro (ADMELOG) corrective regimen sliding scale   SubCutaneous three times a day before meals  insulin lispro (ADMELOG) corrective regimen sliding scale   SubCutaneous at bedtime  lidocaine   Patch 1 Patch Transdermal daily  norepinephrine Infusion 0.05 MICROgram(s)/kG/Min (8.93 mL/Hr) IV Continuous <Continuous>  propofol Infusion 50 MICROgram(s)/kG/Min (28.6 mL/Hr) IV Continuous <Continuous>    MEDICATIONS  (PRN):  acetaminophen   Tablet .. 650 milliGRAM(s) Oral every 6 hours PRN Temp greater or equal to 38C (100.4F), Mild Pain (1 - 3)  ALBUTerol    90 MICROgram(s) HFA Inhaler 1 Puff(s) Inhalation every 4 hours PRN Shortness of Breath  guaiFENesin Oral Liquid (Sugar-Free) 200 milliGRAM(s) Oral every 6 hours PRN Cough      LABS                                            14.9                  Neurophils% (auto):   x      (05-11 @ 05:18):    28.18)-----------(529          Lymphocytes% (auto):  x                                             45.2                   Eosinphils% (auto):   x        Manual%: Neutrophils x    ; Lymphocytes x    ; Eosinophils x    ; Bands%: x    ; Blasts x                                    x      |  x      |  x                   Calcium: x     / iCa: x      (05-10 @ 11:01)    ----------------------------<  x         Magnesium: x                                x       |  x      |  0.80             Phosphorous: x        TPro  6.0    /  Alb  3.0    /  TBili  0.6    /  DBili  0.2    /  AST  33     /  ALT  63     /  AlkPhos  94     10 May 2021 11:01    ( 05-10 @ 11:01 )   PT: 13.2 sec;   INR: 1.11 ratio  aPTT: x          ASSESSMENT & PLAN:   60y-year-old Male with a past medical history of HTN, HLD, DMT2 admitted for acute respiratory failure secondary to COVID-19, now with hypoxic respiratory failure admitted to ICU for further assessment and management.     NEUROLOGY:  Sedation  - Continue sedation with Propofol and Fentanyl   - Start Nimbex.     PULMONARY:  Acute Respiratory Failure  - Vent Settings: Volume /10/450/32   - 1st Prone @ 6am 5/11   - Titrate FiO2 and PEEP as tolerated.     CARDIOLOGY:  Hypotension  - Levophed @ .05 to maintain MAP > 65.     GASTROINTESTINAL:  - Start tube feeds and advance towards goal.   - Formal nutrition consult in am.     RENAL/:  - Boss  - Strict I&Os.     INFECTIOUS DISEASE:  - No ABx for now   - follow up BCx sent on 5/11     COVID-19  - Completed remdesivir (5/3-5/7) Dexamethasone to finish on 5/4-     HEMATOLOGY:  - VTE prophylaxis with Lovenox     ENDOCRINE  - Glucose checks Q6 while on tube feeds.   - SSI  - Add NPH as needed.     ETHICS/DISPOSITION:  - Full code.   - Transfer to [facility] ICU .     LINES/DRAINS/TUBES/DEVICES:  - 5/11 OGT  - 5/11 RRAL, RIJ TLC         Above assessment and plan of care performed, created, and reviewed in real time with ICU attending physician Dr. Dr Camarillo Mars

## 2021-05-11 NOTE — PROVIDER CONTACT NOTE (CHANGE IN STATUS NOTIFICATION) - BACKGROUND
Patient admitted 5/3 for fever, dry cough and SOB for 1 week. Patient admitted 5/3 for fever, dry cough and SOB for 1 week. COVID positive.

## 2021-05-11 NOTE — CHART NOTE - NSCHARTNOTEFT_GEN_A_CORE
CC: Post RRT    Notified by RN that RRT was called for hypoxia. Patient was admitted for COVID-19 pneumonia. Patients O2 saturation dropped to 88-90% overnight and required upgrade from HFNC to BiPAP where his oxygen saturation was 90-95%. Just prior to RRT being called, patient desaturated to 70s. During RRT, unable to get O2 above 82%. Patient consented to intubation. Patient intubated and transferred to MICU for higher level care. Son and daughter in law (Myla) notified. [facility] notified.     Salina Licea PA-C  Medicine   [id number]

## 2021-05-11 NOTE — RAPID RESPONSE TEAM SUMMARY - NSSITUATIONBACKGROUNDRRT_GEN_ALL_CORE
61 yo M with PMH of HTN, HLD, T2DM, presents here with SOB and hypoxia secondary to COVID-19 infection. RRT was called for persistent hypoxia in the 70-80. The patient had recently been stable on HFNC but this evening the patient started having worsening SOB and cough. around midnight the patient was transitioned to BIPAP which he did not tolerate either. RRT was called for persistent hypoxemia despite escalating ventilation. The patient was found to be tachypneic, tachycardic, normotensive and hypoxemic. Recent ABG showed a PaO2 of 78 on 100% fiO2. After discussing with the patient regarding what was going on we agreed that intubation was the next best step. anesthesia was called who intubated the patient. The patient was started on a propofol drip for sedation. the patient was hemodynamically stable for transport. primary team and family were notified, patient transferred to MICU for further management.

## 2021-05-11 NOTE — CONSULT NOTE ADULT - SUBJECTIVE AND OBJECTIVE BOX
This is a 59 yo M with medical history of HTN, DM and hyperlipidemia who was admitted initially for shortness of breath for few days and hypoxemia of 88% on RA, patient was found to have COVID-19 associated pneumonia, he was started on supplemental O2 along with Decadron and Remdisivir. Patient's condition gradually deteriorated to the point he required HFNC then BPAP, today pt was intubated as his SpO2 went down to low 80s% despite all non-invasive measures. In the ICU, patient was placed on V-AC mode with settings of 32/450/14/100, PEEP was up titrated gradually. He remained asynchronous with the ventilator despite adequate sedation, for which Nimbex drip was initiated. Also, his BP went down after high dose propofol, RIJ TLC with A line were inserted.     PE- Intubated, sedated and paralyzed, SpO2 of 88-90% on above settings   HEENT- ETT in place at 23 cm lips level  Chest- Good air entry bilaterally, mild scattered crackles, no wheezes   CV- RRR, normal S1, S2, no gallop  Abdomen- soft, slightly distended   Ext- no pedal edema, poor dorsal pulse bilaterally due to hypotension     Labs reviewed,     CXR- Bilateral lower lobes consolidations, diffuse (lower more than upper lobes) reticular infiltrates   PoCUS shows normal LV function, mild RV dilatation, no McConnel or D signs

## 2021-05-12 LAB
ALBUMIN SERPL ELPH-MCNC: 2.9 G/DL — LOW (ref 3.3–5)
ALP SERPL-CCNC: 95 U/L — SIGNIFICANT CHANGE UP (ref 40–120)
ALT FLD-CCNC: 50 U/L — HIGH (ref 10–45)
ANION GAP SERPL CALC-SCNC: 12 MMOL/L — SIGNIFICANT CHANGE UP (ref 5–17)
AST SERPL-CCNC: 20 U/L — SIGNIFICANT CHANGE UP (ref 10–40)
BILIRUB SERPL-MCNC: 0.9 MG/DL — SIGNIFICANT CHANGE UP (ref 0.2–1.2)
BUN SERPL-MCNC: 29 MG/DL — HIGH (ref 7–23)
CALCIUM SERPL-MCNC: 8.3 MG/DL — LOW (ref 8.4–10.5)
CHLORIDE SERPL-SCNC: 100 MMOL/L — SIGNIFICANT CHANGE UP (ref 96–108)
CO2 SERPL-SCNC: 21 MMOL/L — LOW (ref 22–31)
CREAT SERPL-MCNC: 0.71 MG/DL — SIGNIFICANT CHANGE UP (ref 0.5–1.3)
GAS PNL BLDA: SIGNIFICANT CHANGE UP
GLUCOSE BLDC GLUCOMTR-MCNC: 202 MG/DL — HIGH (ref 70–99)
GLUCOSE BLDC GLUCOMTR-MCNC: 256 MG/DL — HIGH (ref 70–99)
GLUCOSE BLDC GLUCOMTR-MCNC: 305 MG/DL — HIGH (ref 70–99)
GLUCOSE BLDC GLUCOMTR-MCNC: 319 MG/DL — HIGH (ref 70–99)
GLUCOSE BLDC GLUCOMTR-MCNC: 346 MG/DL — HIGH (ref 70–99)
GLUCOSE SERPL-MCNC: 222 MG/DL — HIGH (ref 70–99)
MAGNESIUM SERPL-MCNC: 2.1 MG/DL — SIGNIFICANT CHANGE UP (ref 1.6–2.6)
PHOSPHATE SERPL-MCNC: 3.3 MG/DL — SIGNIFICANT CHANGE UP (ref 2.5–4.5)
POTASSIUM SERPL-MCNC: 4.7 MMOL/L — SIGNIFICANT CHANGE UP (ref 3.5–5.3)
POTASSIUM SERPL-SCNC: 4.7 MMOL/L — SIGNIFICANT CHANGE UP (ref 3.5–5.3)
PROT SERPL-MCNC: 6.3 G/DL — SIGNIFICANT CHANGE UP (ref 6–8.3)
SODIUM SERPL-SCNC: 133 MMOL/L — LOW (ref 135–145)

## 2021-05-12 PROCEDURE — 99291 CRITICAL CARE FIRST HOUR: CPT

## 2021-05-12 RX ORDER — PIPERACILLIN AND TAZOBACTAM 4; .5 G/20ML; G/20ML
3.38 INJECTION, POWDER, LYOPHILIZED, FOR SOLUTION INTRAVENOUS ONCE
Refills: 0 | Status: COMPLETED | OUTPATIENT
Start: 2021-05-12 | End: 2021-05-12

## 2021-05-12 RX ORDER — ACETAMINOPHEN 500 MG
1000 TABLET ORAL ONCE
Refills: 0 | Status: COMPLETED | OUTPATIENT
Start: 2021-05-12 | End: 2021-05-12

## 2021-05-12 RX ORDER — SENNA PLUS 8.6 MG/1
2 TABLET ORAL AT BEDTIME
Refills: 0 | Status: DISCONTINUED | OUTPATIENT
Start: 2021-05-12 | End: 2021-05-12

## 2021-05-12 RX ORDER — HUMAN INSULIN 100 [IU]/ML
5 INJECTION, SUSPENSION SUBCUTANEOUS EVERY 6 HOURS
Refills: 0 | Status: DISCONTINUED | OUTPATIENT
Start: 2021-05-12 | End: 2021-05-13

## 2021-05-12 RX ORDER — PIPERACILLIN AND TAZOBACTAM 4; .5 G/20ML; G/20ML
3.38 INJECTION, POWDER, LYOPHILIZED, FOR SOLUTION INTRAVENOUS EVERY 8 HOURS
Refills: 0 | Status: DISCONTINUED | OUTPATIENT
Start: 2021-05-12 | End: 2021-05-18

## 2021-05-12 RX ORDER — FUROSEMIDE 40 MG
40 TABLET ORAL ONCE
Refills: 0 | Status: COMPLETED | OUTPATIENT
Start: 2021-05-12 | End: 2021-05-12

## 2021-05-12 RX ORDER — SENNA PLUS 8.6 MG/1
10 TABLET ORAL AT BEDTIME
Refills: 0 | Status: DISCONTINUED | OUTPATIENT
Start: 2021-05-12 | End: 2021-05-21

## 2021-05-12 RX ORDER — POLYETHYLENE GLYCOL 3350 17 G/17G
17 POWDER, FOR SOLUTION ORAL DAILY
Refills: 0 | Status: DISCONTINUED | OUTPATIENT
Start: 2021-05-12 | End: 2021-05-13

## 2021-05-12 RX ORDER — INSULIN HUMAN 100 [IU]/ML
5 INJECTION, SOLUTION SUBCUTANEOUS ONCE
Refills: 0 | Status: COMPLETED | OUTPATIENT
Start: 2021-05-12 | End: 2021-05-12

## 2021-05-12 RX ADMIN — Medication 40 MILLIGRAM(S): at 05:43

## 2021-05-12 RX ADMIN — INSULIN HUMAN 5 UNIT(S): 100 INJECTION, SOLUTION SUBCUTANEOUS at 13:49

## 2021-05-12 RX ADMIN — Medication 6: at 17:21

## 2021-05-12 RX ADMIN — Medication 8: at 10:47

## 2021-05-12 RX ADMIN — Medication 6 MILLIGRAM(S): at 05:22

## 2021-05-12 RX ADMIN — Medication 1000 MILLIGRAM(S): at 05:51

## 2021-05-12 RX ADMIN — POLYETHYLENE GLYCOL 3350 17 GRAM(S): 17 POWDER, FOR SOLUTION ORAL at 10:49

## 2021-05-12 RX ADMIN — Medication 400 MILLIGRAM(S): at 05:21

## 2021-05-12 RX ADMIN — Medication 650 MILLIGRAM(S): at 10:47

## 2021-05-12 RX ADMIN — HUMAN INSULIN 5 UNIT(S): 100 INJECTION, SUSPENSION SUBCUTANEOUS at 17:21

## 2021-05-12 RX ADMIN — PIPERACILLIN AND TAZOBACTAM 200 GRAM(S): 4; .5 INJECTION, POWDER, LYOPHILIZED, FOR SOLUTION INTRAVENOUS at 06:02

## 2021-05-12 RX ADMIN — INSULIN HUMAN 5 UNIT(S): 100 INJECTION, SOLUTION SUBCUTANEOUS at 13:13

## 2021-05-12 RX ADMIN — CISATRACURIUM BESYLATE 11.4 MICROGRAM(S)/KG/MIN: 2 INJECTION INTRAVENOUS at 05:33

## 2021-05-12 RX ADMIN — PIPERACILLIN AND TAZOBACTAM 25 GRAM(S): 4; .5 INJECTION, POWDER, LYOPHILIZED, FOR SOLUTION INTRAVENOUS at 17:20

## 2021-05-12 RX ADMIN — SENNA PLUS 10 MILLILITER(S): 8.6 TABLET ORAL at 21:07

## 2021-05-12 RX ADMIN — LIDOCAINE 1 PATCH: 4 CREAM TOPICAL at 06:27

## 2021-05-12 RX ADMIN — Medication 650 MILLIGRAM(S): at 11:15

## 2021-05-12 RX ADMIN — ATORVASTATIN CALCIUM 20 MILLIGRAM(S): 80 TABLET, FILM COATED ORAL at 21:08

## 2021-05-12 RX ADMIN — PROPOFOL 28.6 MICROGRAM(S)/KG/MIN: 10 INJECTION, EMULSION INTRAVENOUS at 05:32

## 2021-05-12 RX ADMIN — FENTANYL CITRATE 9.53 MICROGRAM(S)/KG/HR: 50 INJECTION INTRAVENOUS at 05:47

## 2021-05-12 RX ADMIN — CHLORHEXIDINE GLUCONATE 15 MILLILITER(S): 213 SOLUTION TOPICAL at 17:21

## 2021-05-12 RX ADMIN — PIPERACILLIN AND TAZOBACTAM 25 GRAM(S): 4; .5 INJECTION, POWDER, LYOPHILIZED, FOR SOLUTION INTRAVENOUS at 10:46

## 2021-05-12 RX ADMIN — Medication 8.93 MICROGRAM(S)/KG/MIN: at 05:33

## 2021-05-12 RX ADMIN — CHLORHEXIDINE GLUCONATE 1 APPLICATION(S): 213 SOLUTION TOPICAL at 06:27

## 2021-05-12 RX ADMIN — ENOXAPARIN SODIUM 40 MILLIGRAM(S): 100 INJECTION SUBCUTANEOUS at 10:46

## 2021-05-12 RX ADMIN — Medication 4: at 05:32

## 2021-05-12 RX ADMIN — CHLORHEXIDINE GLUCONATE 15 MILLILITER(S): 213 SOLUTION TOPICAL at 05:22

## 2021-05-12 NOTE — PROGRESS NOTE ADULT - SUBJECTIVE AND OBJECTIVE BOX
CHIEF COMPLAINT:  Patient is a 60y old  Male who presents with a chief complaint of shortness of breath and hypoxemia (11 May 2021 04:38)    HPI:  61 yo M with PMH of HTN, HLD, T2DM, presents here with SOB and hypoxia.  Patient started feeling fatigue and malaise on Monday.  Over the last three days, he also started having decreased appetite, mild cough, SOB, feverish, chills.  He has been mostly in bed, not able to do his usual ADLs.  This morning he had an episode of vomiting.  He was also having some back pain, which is worse with walking.  He had no diarrhea.  Daughter-in-law checked his pulse ox at home which was 88%, which prompted her to bring him to hospital.  While in ED, patient felt lethargic and had a brief 20 sec period of unresponsiveness.  Patient denies losing consciousness; states he remembers hearing his daughter-in-law calling his name, but he did not respond because he was not feeling well.  In ED, his vitals showed temp 98.5, HR 89, /70, RR 19-26, saturation 88% on RA.   (03 May 2021 03:11)      Interval Events: Supined at 1230, Peep decreased to 14 and fio2 down to 70. Febrile at 38.4---> zosyn started empirically, lasix 40mg x1      REVIEW OF SYSTEMS: unable 2/2 sedation          OBJECTIVE:  ICU Vital Signs Last 24 Hrs  T(C): 38.2 (12 May 2021 06:00), Max: 38.6 (12 May 2021 04:30)  T(F): 100.8 (12 May 2021 06:00), Max: 101.5 (12 May 2021 04:30)  HR: 70 (12 May 2021 05:45) (70 - 102)  BP: --  BP(mean): --  ABP: 107/53 (12 May 2021 05:45) (90/56 - 123/76)  ABP(mean): 72 (12 May 2021 05:45) (67 - 93)  RR: 32 (12 May 2021 05:45) (32 - 32)  SpO2: 98% (12 May 2021 05:45) (89% - 100%)    Mode: AC/ CMV (Assist Control/ Continuous Mandatory Ventilation), RR (machine): 32, TV (machine): 450, FiO2: 70, PEEP: 16, ITime: 1, MAP: 23, PIP: 35    05-10 @ 07:  -   @ 07:00  --------------------------------------------------------  IN: 182.1 mL / OUT: 1300 mL / NET: -1117.9 mL     @ 07:  -   @ 06:11  --------------------------------------------------------  IN: 1970.2 mL / OUT: 951 mL / NET: 1019.2 mL      CAPILLARY BLOOD GLUCOSE      POCT Blood Glucose.: 202 mg/dL (12 May 2021 05:31)          PHYSICAL EXAM:    GENERAL: NAD,  HEENT:  Atraumatic, Normocephalic  EYES: PERRLA, conjunctiva and sclera clear  NECK: Supple, No JVD  CHEST/LUNG: diminished bilaterally; No wheezes, rales, or rhonchi  HEART: Regular rate and rhythm; No murmurs, rubs, or gallops  ABDOMEN: Soft, Nontender, Nondistended; Bowel sounds present  EXTREMITIES:  2+ Peripheral Pulses, No clubbing, cyanosis, or edema  PSYCH: unable as pt is sedated  NEUROLOGY: sedated  SKIN: No rashes or lesions      HOSPITAL MEDICATIONS:  MEDICATIONS  (STANDING):  atorvastatin 20 milliGRAM(s) Oral at bedtime  chlorhexidine 0.12% Liquid 15 milliLiter(s) Oral Mucosa every 12 hours  chlorhexidine 4% Liquid 1 Application(s) Topical <User Schedule>  cisatracurium Infusion 2 MICROgram(s)/kG/Min (11.4 mL/Hr) IV Continuous <Continuous>  dexAMETHasone  Injectable 6 milliGRAM(s) IV Push daily  enoxaparin Injectable 40 milliGRAM(s) SubCutaneous daily  fentaNYL   Infusion... 2 MICROgram(s)/kG/Hr (9.53 mL/Hr) IV Continuous <Continuous>  insulin lispro (ADMELOG) corrective regimen sliding scale   SubCutaneous every 6 hours  lidocaine   Patch 1 Patch Transdermal daily  norepinephrine Infusion 0.05 MICROgram(s)/kG/Min (8.93 mL/Hr) IV Continuous <Continuous>  piperacillin/tazobactam IVPB. 3.375 Gram(s) IV Intermittent once  piperacillin/tazobactam IVPB.. 3.375 Gram(s) IV Intermittent every 8 hours  propofol Infusion 50 MICROgram(s)/kG/Min (28.6 mL/Hr) IV Continuous <Continuous>    MEDICATIONS  (PRN):  acetaminophen   Tablet .. 650 milliGRAM(s) Oral every 6 hours PRN Temp greater or equal to 38C (100.4F), Mild Pain (1 - 3)  ALBUTerol    90 MICROgram(s) HFA Inhaler 1 Puff(s) Inhalation every 4 hours PRN Shortness of Breath      LABS:                        14.7   21.50 )-----------( 397      ( 11 May 2021 23:45 )             45.2     Hgb Trend: 14.7<--, 15.0<--, 14.9<--, 14.9<--, 15.2<--  05-11    133<L>  |  100  |  29<H>  ----------------------------<  222<H>  4.7   |  21<L>  |  0.71    Ca    8.3<L>      11 May 2021 23:45  Phos  3.3       Mg     2.1         TPro  6.3  /  Alb  2.9<L>  /  TBili  0.9  /  DBili  x   /  AST  20  /  ALT  50<H>  /  AlkPhos  95  05-11    LIVER FUNCTIONS - ( 11 May 2021 23:45 )  Alb: 2.9 g/dL / Pro: 6.3 g/dL / ALK PHOS: 95 U/L / ALT: 50 U/L / AST: 20 U/L / GGT: x           Creatinine Trend: 0.71<--, 0.71<--, 0.80<--, 0.81<--, 0.82<--, 0.81<--  PT/INR - ( 11 May 2021 05:18 )   PT: 12.8 sec;   INR: 1.07 ratio         PTT - ( 11 May 2021 05:18 )  PTT:27.9 sec  Urinalysis Basic - ( 11 May 2021 05:18 )    Color: Yellow / Appearance: Slightly Turbid / S.034 / pH: x  Gluc: x / Ketone: Negative  / Bili: Negative / Urobili: 2 mg/dL   Blood: x / Protein: 30 mg/dL / Nitrite: Negative   Leuk Esterase: Negative / RBC: 4 /hpf / WBC 3 /HPF   Sq Epi: x / Non Sq Epi: 4 /hpf / Bacteria: Negative      Arterial Blood Gas:   @ 02:08  7.44/36/107/24/98/1.0  ABG lactate: --  Arterial Blood Gas:   @ 23:40  7.42/39/122/25/98/.8  ABG lactate: --  Arterial Blood Gas:   @ 11:15  7.40/39/96/24/96/-.4  ABG lactate: --  Arterial Blood Gas:   @ 06:34  7.42/37/65/24/91/.2  ABG lactate: --  Arterial Blood Gas:   @ 04:51  7.40/40/62/25/89/.5  ABG lactate: --  Arterial Blood Gas:  05-10 @ 22:44  7.44/38/78/25/95/1.9  ABG lactate: --        MICROBIOLOGY:     RADIOLOGY:  [ ] Reviewed and interpreted by me    EKG:

## 2021-05-12 NOTE — PROGRESS NOTE ADULT - ASSESSMENT
59 yo M with medical history of HTN, DM and hyperlipidemia who was admitted initially for shortness of breath for few days and hypoxemia of 88% on RA, patient was found to have COVID-19 associated pneumonia, he was started on supplemental O2 along with Decadron and Remdisivir. Patient's condition gradually deteriorated to the point he required HFNC then BPAP, today pt was intubated as his SpO2 went down to low 80s% despite all non-invasive measures. In the ICU, patient was placed on V-AC mode with settings of 32/450/14/100, PEEP was up titrated gradually. He remained asynchronous with the ventilator despite adequate sedation, for which Nimbex drip was initiated. Also, his BP went down after high dose propofol, RIJ TLC with A line were inserted.     5/3 Admitted to [facility]  5/11 Intubated transfered to 5 ICU and proned  5/12 supined     Neuro:   Fent  2, prop 50, Nimbex 2    CV:   - Hx of HTN now with sedation related hypotension  -Levo .07    Resp:   AC: 70%/14/450/32--7.40/36/107/24/80/96  -Supined. PF ratio 133, no further proning    GI:   -vital AF 10cc/h--Increase Tube feeds today  -c/w bowel regimen  - GI PPX    Renal:   -Boss in place uo 30-60cc/h  Lasix 40 x1 overnight  trend electrolytes    Heme:   -Lovenox qd   H+H stable    ID: BCx x 2 sent on 5/11. No ABx   -c/w decadron (5/4- )   -completed remdesivir 5/3-5/7     Endo:   -NISS q6   -278   59 yo M with medical history of HTN, DM and hyperlipidemia who was admitted initially for shortness of breath for few days and hypoxemia of 88% on RA, patient was found to have COVID-19 associated pneumonia, he was started on supplemental O2 along with Decadron and Remdisivir. Patient's condition gradually deteriorated to the point he required HFNC then BPAP, today pt was intubated as his SpO2 went down to low 80s% despite all non-invasive measures. In the ICU, patient was placed on V-AC mode with settings of 32/450/14/100, PEEP was up titrated gradually. He remained asynchronous with the ventilator despite adequate sedation, for which Nimbex drip was initiated. Also, his BP went down after high dose propofol, RIJ TLC with A line were inserted.     5/3 Admitted to [facility]  5/11 Intubated transfered to 5 ICU and proned  5/12 supined     Neuro:   Fent  2, prop 50, Nimbex 2  Attempt to d/c nimbex if abg remains stable this am    CV:   - Hx of HTN now with sedation related hypotension  -Levo .05 wean levo today    Resp:   AC: 70%/14/450/32--7.40/36/107/24/80/96  -Supined. PF ratio 133, no further proning for now    GI:   -vital AF 10cc/h--Increase Tube feeds today to 25cc/h  -Add bowel regimen  - GI PPX    Renal:   -Boss in place uo 30-60cc/h  Lasix 40 x1 overnight  trend electrolytes    Heme:   -Lovenox qd   H+H stable    ID: BCx x 2 sent on 5/11. No ABx   -c/w decadron (5/4- )   -completed remdesivir 5/3-5/7     Endo:   -NISS q6   -278

## 2021-05-12 NOTE — PROGRESS NOTE ADULT - ATTENDING COMMENTS
60 year old man with history of DM, HTN and hyperlipidemia admitted 5/3 with covid infection. Intubated 5/11 for hypoxic respiratory failure and ARDS secondary to viral pneumonia from COVID infection. Initially required prone ventilation for profound hypoxemia supined overnight P/F ratio improved no need for proning now    - sedated and paralyzed for comfort and vent coordination  - tolerating tube feeds will advance as tolerated, follow up nutrition recommendations  - LFTs within normal limits  - adequate urine output, volume status net positive overnight, given dose of diuretics will monitor I's and O's  - febrile with elevated WBC on empiric ABx cultures NGTD  - on low dose vasopressors for BP support likely medication effect will wean as tolerated  - P/F ratio improved trial off paralytics today  - hgb stable on DVT prophylaxis    Prognosis guarded

## 2021-05-13 LAB
ALBUMIN SERPL ELPH-MCNC: 2.9 G/DL — LOW (ref 3.3–5)
ALBUMIN SERPL ELPH-MCNC: 2.9 G/DL — LOW (ref 3.3–5)
ALP SERPL-CCNC: 84 U/L — SIGNIFICANT CHANGE UP (ref 40–120)
ALP SERPL-CCNC: 89 U/L — SIGNIFICANT CHANGE UP (ref 40–120)
ALT FLD-CCNC: 39 U/L — SIGNIFICANT CHANGE UP (ref 10–45)
ALT FLD-CCNC: 42 U/L — SIGNIFICANT CHANGE UP (ref 10–45)
ANION GAP SERPL CALC-SCNC: 11 MMOL/L — SIGNIFICANT CHANGE UP (ref 5–17)
ANION GAP SERPL CALC-SCNC: 9 MMOL/L — SIGNIFICANT CHANGE UP (ref 5–17)
AST SERPL-CCNC: 15 U/L — SIGNIFICANT CHANGE UP (ref 10–40)
AST SERPL-CCNC: 20 U/L — SIGNIFICANT CHANGE UP (ref 10–40)
BILIRUB SERPL-MCNC: 0.8 MG/DL — SIGNIFICANT CHANGE UP (ref 0.2–1.2)
BILIRUB SERPL-MCNC: 0.8 MG/DL — SIGNIFICANT CHANGE UP (ref 0.2–1.2)
BUN SERPL-MCNC: 30 MG/DL — HIGH (ref 7–23)
BUN SERPL-MCNC: 32 MG/DL — HIGH (ref 7–23)
CALCIUM SERPL-MCNC: 8 MG/DL — LOW (ref 8.4–10.5)
CALCIUM SERPL-MCNC: 9.1 MG/DL — SIGNIFICANT CHANGE UP (ref 8.4–10.5)
CHLORIDE SERPL-SCNC: 98 MMOL/L — SIGNIFICANT CHANGE UP (ref 96–108)
CHLORIDE SERPL-SCNC: 99 MMOL/L — SIGNIFICANT CHANGE UP (ref 96–108)
CO2 SERPL-SCNC: 23 MMOL/L — SIGNIFICANT CHANGE UP (ref 22–31)
CO2 SERPL-SCNC: 24 MMOL/L — SIGNIFICANT CHANGE UP (ref 22–31)
CREAT SERPL-MCNC: 0.72 MG/DL — SIGNIFICANT CHANGE UP (ref 0.5–1.3)
CREAT SERPL-MCNC: 0.75 MG/DL — SIGNIFICANT CHANGE UP (ref 0.5–1.3)
GAS PNL BLDA: SIGNIFICANT CHANGE UP
GLUCOSE BLDC GLUCOMTR-MCNC: 164 MG/DL — HIGH (ref 70–99)
GLUCOSE BLDC GLUCOMTR-MCNC: 206 MG/DL — HIGH (ref 70–99)
GLUCOSE BLDC GLUCOMTR-MCNC: 210 MG/DL — HIGH (ref 70–99)
GLUCOSE SERPL-MCNC: 222 MG/DL — HIGH (ref 70–99)
GLUCOSE SERPL-MCNC: 237 MG/DL — HIGH (ref 70–99)
HCT VFR BLD CALC: 42.1 % — SIGNIFICANT CHANGE UP (ref 39–50)
HGB BLD-MCNC: 14 G/DL — SIGNIFICANT CHANGE UP (ref 13–17)
MAGNESIUM SERPL-MCNC: 2 MG/DL — SIGNIFICANT CHANGE UP (ref 1.6–2.6)
MAGNESIUM SERPL-MCNC: 2.2 MG/DL — SIGNIFICANT CHANGE UP (ref 1.6–2.6)
MCHC RBC-ENTMCNC: 28.6 PG — SIGNIFICANT CHANGE UP (ref 27–34)
MCHC RBC-ENTMCNC: 33.3 GM/DL — SIGNIFICANT CHANGE UP (ref 32–36)
MCV RBC AUTO: 86.1 FL — SIGNIFICANT CHANGE UP (ref 80–100)
NRBC # BLD: 0 /100 WBCS — SIGNIFICANT CHANGE UP (ref 0–0)
PHOSPHATE SERPL-MCNC: 3.2 MG/DL — SIGNIFICANT CHANGE UP (ref 2.5–4.5)
PHOSPHATE SERPL-MCNC: 3.2 MG/DL — SIGNIFICANT CHANGE UP (ref 2.5–4.5)
PLATELET # BLD AUTO: 289 K/UL — SIGNIFICANT CHANGE UP (ref 150–400)
POTASSIUM SERPL-MCNC: 4 MMOL/L — SIGNIFICANT CHANGE UP (ref 3.5–5.3)
POTASSIUM SERPL-MCNC: 4.4 MMOL/L — SIGNIFICANT CHANGE UP (ref 3.5–5.3)
POTASSIUM SERPL-SCNC: 4 MMOL/L — SIGNIFICANT CHANGE UP (ref 3.5–5.3)
POTASSIUM SERPL-SCNC: 4.4 MMOL/L — SIGNIFICANT CHANGE UP (ref 3.5–5.3)
PROT SERPL-MCNC: 6.1 G/DL — SIGNIFICANT CHANGE UP (ref 6–8.3)
PROT SERPL-MCNC: 6.2 G/DL — SIGNIFICANT CHANGE UP (ref 6–8.3)
RBC # BLD: 4.89 M/UL — SIGNIFICANT CHANGE UP (ref 4.2–5.8)
RBC # FLD: 11.9 % — SIGNIFICANT CHANGE UP (ref 10.3–14.5)
SODIUM SERPL-SCNC: 131 MMOL/L — LOW (ref 135–145)
SODIUM SERPL-SCNC: 133 MMOL/L — LOW (ref 135–145)
TROPONIN T, HIGH SENSITIVITY RESULT: 7 NG/L — SIGNIFICANT CHANGE UP (ref 0–51)
WBC # BLD: 15.15 K/UL — HIGH (ref 3.8–10.5)
WBC # FLD AUTO: 15.15 K/UL — HIGH (ref 3.8–10.5)

## 2021-05-13 PROCEDURE — 99291 CRITICAL CARE FIRST HOUR: CPT

## 2021-05-13 PROCEDURE — 71045 X-RAY EXAM CHEST 1 VIEW: CPT | Mod: 26

## 2021-05-13 PROCEDURE — 93010 ELECTROCARDIOGRAM REPORT: CPT | Mod: 76

## 2021-05-13 RX ORDER — PANTOPRAZOLE SODIUM 20 MG/1
40 TABLET, DELAYED RELEASE ORAL DAILY
Refills: 0 | Status: DISCONTINUED | OUTPATIENT
Start: 2021-05-13 | End: 2021-05-21

## 2021-05-13 RX ORDER — CHOLECALCIFEROL (VITAMIN D3) 125 MCG
1000 CAPSULE ORAL DAILY
Refills: 0 | Status: DISCONTINUED | OUTPATIENT
Start: 2021-05-13 | End: 2021-05-21

## 2021-05-13 RX ORDER — CALCIUM GLUCONATE 100 MG/ML
2 VIAL (ML) INTRAVENOUS ONCE
Refills: 0 | Status: COMPLETED | OUTPATIENT
Start: 2021-05-13 | End: 2021-05-13

## 2021-05-13 RX ORDER — ACETAMINOPHEN 500 MG
1000 TABLET ORAL ONCE
Refills: 0 | Status: COMPLETED | OUTPATIENT
Start: 2021-05-13 | End: 2021-05-13

## 2021-05-13 RX ORDER — POLYETHYLENE GLYCOL 3350 17 G/17G
17 POWDER, FOR SOLUTION ORAL
Refills: 0 | Status: DISCONTINUED | OUTPATIENT
Start: 2021-05-13 | End: 2021-05-21

## 2021-05-13 RX ORDER — DEXMEDETOMIDINE HYDROCHLORIDE IN 0.9% SODIUM CHLORIDE 4 UG/ML
1.5 INJECTION INTRAVENOUS
Qty: 200 | Refills: 0 | Status: DISCONTINUED | OUTPATIENT
Start: 2021-05-13 | End: 2021-05-16

## 2021-05-13 RX ADMIN — Medication 2: at 05:27

## 2021-05-13 RX ADMIN — PANTOPRAZOLE SODIUM 40 MILLIGRAM(S): 20 TABLET, DELAYED RELEASE ORAL at 20:47

## 2021-05-13 RX ADMIN — Medication 650 MILLIGRAM(S): at 12:00

## 2021-05-13 RX ADMIN — HUMAN INSULIN 5 UNIT(S): 100 INJECTION, SUSPENSION SUBCUTANEOUS at 05:27

## 2021-05-13 RX ADMIN — Medication 650 MILLIGRAM(S): at 06:30

## 2021-05-13 RX ADMIN — CHLORHEXIDINE GLUCONATE 1 APPLICATION(S): 213 SOLUTION TOPICAL at 06:29

## 2021-05-13 RX ADMIN — Medication 400 MILLIGRAM(S): at 20:46

## 2021-05-13 RX ADMIN — Medication 6 MILLIGRAM(S): at 06:26

## 2021-05-13 RX ADMIN — PIPERACILLIN AND TAZOBACTAM 25 GRAM(S): 4; .5 INJECTION, POWDER, LYOPHILIZED, FOR SOLUTION INTRAVENOUS at 17:22

## 2021-05-13 RX ADMIN — Medication 1000 MILLIGRAM(S): at 21:30

## 2021-05-13 RX ADMIN — HUMAN INSULIN 5 UNIT(S): 100 INJECTION, SUSPENSION SUBCUTANEOUS at 11:45

## 2021-05-13 RX ADMIN — PIPERACILLIN AND TAZOBACTAM 25 GRAM(S): 4; .5 INJECTION, POWDER, LYOPHILIZED, FOR SOLUTION INTRAVENOUS at 11:44

## 2021-05-13 RX ADMIN — PROPOFOL 28.6 MICROGRAM(S)/KG/MIN: 10 INJECTION, EMULSION INTRAVENOUS at 00:27

## 2021-05-13 RX ADMIN — Medication 650 MILLIGRAM(S): at 06:00

## 2021-05-13 RX ADMIN — CHLORHEXIDINE GLUCONATE 15 MILLILITER(S): 213 SOLUTION TOPICAL at 06:26

## 2021-05-13 RX ADMIN — Medication 4: at 00:06

## 2021-05-13 RX ADMIN — HUMAN INSULIN 5 UNIT(S): 100 INJECTION, SUSPENSION SUBCUTANEOUS at 00:06

## 2021-05-13 RX ADMIN — Medication 1000 UNIT(S): at 11:45

## 2021-05-13 RX ADMIN — ENOXAPARIN SODIUM 40 MILLIGRAM(S): 100 INJECTION SUBCUTANEOUS at 12:00

## 2021-05-13 RX ADMIN — Medication 650 MILLIGRAM(S): at 12:30

## 2021-05-13 RX ADMIN — Medication 200 GRAM(S): at 10:19

## 2021-05-13 RX ADMIN — Medication 4: at 11:46

## 2021-05-13 RX ADMIN — PIPERACILLIN AND TAZOBACTAM 25 GRAM(S): 4; .5 INJECTION, POWDER, LYOPHILIZED, FOR SOLUTION INTRAVENOUS at 01:05

## 2021-05-13 NOTE — PROGRESS NOTE ADULT - ASSESSMENT
61 yo M with medical history of HTN, DM and hyperlipidemia who was admitted initially for shortness of breath for few days and hypoxemia of 88% on RA, patient was found to have COVID-19 associated pneumonia, he was started on supplemental O2 along with Decadron and Remdisivir. Patient's condition gradually deteriorated to the point he required HFNC then BPAP, today pt was intubated as his SpO2 went down to low 80s% despite all non-invasive measures. In the ICU, patient was placed on V-AC mode with settings of 32/450/14/100, PEEP was up titrated gradually. He remained asynchronous with the ventilator despite adequate sedation, for which Nimbex drip was initiated. Also, his BP went down after high dose propofol, RIJ TLC with A line were inserted.     5/3 Admitted to [facility]  5/11 Intubated transfered to 5 ICU and proned  5/12 supined     Neuro:   prop 50,   Add precedex today and wean off prop      CV:   - Hx of HTN now with sedation related hypotension  -Levo off keep maps >65    Resp:   AC: 450/26/14/45--7.46/32/174/23/45/99  -Supined. PF ratio 380, no further proning for now    GI:   -vital AF 10cc/h--Increased Tube feeds today to 25cc/h  -Add bowel regimen  - GI PPX    Renal:   -Boss in place uo 30-60cc/h  s/p lasix 5/11  trend electrolytes    Heme:   -Lovenox qd   H+H stable    ID: BCx x 2 sent on 5/11. No ABx   -c/w decadron (5/4- )   -completed remdesivir 5/3-5/7     Endo:   -NISS q6   -278       61 yo M with medical history of HTN, DM and hyperlipidemia who was admitted initially for shortness of breath for few days and hypoxemia of 88% on RA, patient was found to have COVID-19 associated pneumonia, he was started on supplemental O2 along with Decadron and Remdisivir. Patient's condition gradually deteriorated to the point he required HFNC then BPAP, today pt was intubated as his SpO2 went down to low 80s% despite all non-invasive measures. In the ICU, patient was placed on V-AC mode with settings of 32/450/14/100, PEEP was up titrated gradually. He remained asynchronous with the ventilator despite adequate sedation, for which Nimbex drip was initiated. Also, his BP went down after high dose propofol, RIJ TLC with A line were inserted.     5/3 Admitted to [facility]  5/11 Intubated transfered to 5 ICU and proned  5/12 supined     Neuro:   prop 50,   Add precedex today and wean off prop      CV:   - Hx of HTN now with sedation related hypotension  -Levo off keep maps >65    Resp:   AC: 450/26/14/45--7.45/37/71/26/45/93  -Supined. PF ratio 157 no further proning for now    GI:   -vital AF  25cc/h  -CW bowel regimen-increase miralax twice daily  - GI PPX    Renal:   -Boss in place uo 30-60cc/h  s/p lasix 5/11  trend electrolytes    Heme:   -Lovenox qd   H+H stable    ID: BCx x 2 sent on 5/11. No ABx   -c/w decadron (5/4- )   -completed remdesivir 5/3-5/7   On zosyn day 2 empiric 2/2 fever and leukocytosis    Endo:   -NISS q6   -278       59 yo M with medical history of HTN, DM and hyperlipidemia who was admitted initially for shortness of breath for few days and hypoxemia of 88% on RA, patient was found to have COVID-19 associated pneumonia, he was started on supplemental O2 along with Decadron and Remdisivir. Patient's condition gradually deteriorated to the point he required HFNC then BPAP, today pt was intubated as his SpO2 went down to low 80s% despite all non-invasive measures. In the ICU, patient was placed on V-AC mode with settings of 32/450/14/100, PEEP was up titrated gradually. He remained asynchronous with the ventilator despite adequate sedation, for which Nimbex drip was initiated. Also, his BP went down after high dose propofol, RIJ TLC with A line were inserted.     5/3 Admitted to [facility]  5/11 Intubated transfered to 5 ICU and proned  5/12 supined  5/13: New Pneumomediastinum    Neuro:   Weaned off sedation and on Precedex 1.5  Following commands to all extremities    CV:   - Hx of HTN now with sedation related hypotension  -Levo .02 wean levo today for maps >65    Resp:   AC: 450/26/10/45-->cpap 12/10  -s/p proned on 5/11 and did well after 1 proning no longer requiries it.  -Now with new Pneumomediastinal subcue air, no PTX-daily cxr    GI:   -vital AF 10cc/h--Increased Tube feeds today to goal of 40cc/h  -Add bowel regimen.  - GI PPX    Renal:   -Boss in place uo -60cc/h  s/p lasix 5/11  trend electrolytes    Heme:   -Lovenox qd   H+H stable    ID:   On empiric Zosyn day 2/5  BCx x 2 sent on 5/11--neg. off ABx   -c/w decadron (5/4- )   -completed remdesivir 5/3-5/7     Endo:   -NPH 5 q6  with SSC  -210

## 2021-05-13 NOTE — CHART NOTE - NSCHARTNOTEFT_GEN_A_CORE
Nutrition Follow Up Note  Patient seen for: Initial RD assessment on 5ICU    Chart reviewed, events noted. Per chart: 60M, PMH of HTN, HLD, T2DM. P/w SOB and hypoxia, found to have COVID-19 PNA, subsequently intubated  and transferred to 5ICU.  - Sedated (propofol); Pressor support (levo)  - Proned ; supine     Source: EMR, Team    -If unable to interview patient: [x] Trach/Vent/BiPAP  [] Disoriented/confused/inappropriate to interview    Diet Order:   Diet, NPO with Tube Feed:   Tube Feeding Modality: Orogastric  Vital AF (VITALAFRTH)  Total Volume for 24 Hours (mL): 600  Continuous  Starting Tube Feed Rate {mL per Hour}: 10  Until Goal Tube Feed Rate (mL per Hour): 25  Tube Feed Duration (in Hours): 24  Tube Feed Start Time: 08:30 (21)    EN Order Provides: 600ml formula, 720kcals, 45g protein, 487ml free H2O  Current Pump Rate: 25ml/hr; 33% EN volume goal so far today  EN provision: 54% EN volume goal provided in past 2 days (since EN initiated)    Is current diet order appropriate/adequate? [] Yes  [x]  No: with propofol kcals, EN regimen meets energy needs, however, does not meet protein needs      Nutrition-Related Events:  - Propofol ordered -  if infusion continues at current rate (28.2 mL/hr), will provide 745 kcals/day      - Plan to add precedex today in efforts to wean off prop  - 5u NPH q6hrs + Insulin Sliding Scale to optimize BG; A1C 7.2% ()  - Currently receiving Dexamethasone  - EN initiated   - Prior to intubation, pt was noted to be eating well at meals w/out any changes in appetite/PO intake (per initial RD note 5/10)    GI:  Last BM .   Bowel Regimen? [x] Yes (miralax, senna)  [] No  - Noted pt on abx course at this time    Weights:   Daily Weight in k.3 (), 93.4 ()  - Noted weight fluctuations may be 2/2 fluid shifts as pt has received diuretic therapies in-house (last received lasix )    Drug Dosing Weight  Height (cm): 177.8 ()  Weight (kg): 95.3 ()  BMI (kg/m2): 30.1 ()  IBW: 166Ibs/75.2kg      MEDICATIONS  (STANDING):  atorvastatin  dexAMETHasone  Injectable  insulin lispro (ADMELOG) corrective regimen sliding scale  insulin NPH human recombinant  piperacillin/tazobactam IVPB..  polyethylene glycol 3350  senna Syrup    Pertinent Labs:  @ 23:58: Na 133<L>, BUN 30<H>, Cr 0.75, <H>, K+ 4.0, Phos 3.2, Mg 2.2, Alk Phos 84, ALT/SGPT 39, AST/SGOT 15, HbA1c --    A1C with Estimated Average Glucose Result: 7.2 % (21 @ 08:55)    Finger Sticks:  POCT Blood Glucose.: 164 mg/dL ( @ 04:51)  POCT Blood Glucose.: 256 mg/dL ( @ 17:17)  POCT Blood Glucose.: 305 mg/dL ( @ 13:46)  POCT Blood Glucose.: 319 mg/dL ( @ 10:39)  POCT Blood Glucose.: 346 mg/dL ( @ 10:38)        Skin per nursing documentation: no pressure injuries noted  Edema: none noted ()    Estimated Nutritional Needs: with consideration for intubation  & BMI >30  Energy Needs (14-18 kcals/kg): 4658-5570 - Based on dosing wt 95.3kg  Protein Needs (1.4-1.8 g/kg): 105-135 - Based on IBW 75.2kg  Butler State Equation (REE): 2183kcals (23kcals/kg per dosing wt)    Previous Nutrition Diagnosis: No active nutrition diagnosis identified at this time  Nutrition Diagnosis is: [] ongoing  [] resolved [] not applicable     New Nutrition Diagnosis: [] Not applicable    Nutrition Care Plan:  [] In Progress  [] Achieved  [] Not applicable      Recommendations:      - Prop providing 745kcals  - Consider addition of Multivitamin & Vitamin D supplementation pending no existing contraindications; Please obtain serum Vitamin D levels as feasible.  - Adjust insulin regimen PRN to maintain BG <180  - Monitor GI tolerance and BM's; last documented BM   [] Continue current diet order        [] Add oral nutrition supplement:  [] Discontinue current diet order. Recommend:   [] Add micronutrient supplementation:   [] Continue current micronutrient supplementation:   [] Other:     Monitoring and Evaluation:   Continue to monitor nutritional intake, tolerance to diet prescription, weights, labs, skin integrity  RD remains available upon request and will follow up per protocol Nutrition Follow Up Note  Patient seen for: Initial RD assessment on 5ICU    Chart reviewed, events noted. Per chart: 60M, PMH of HTN, HLD, T2DM. P/w SOB and hypoxia, found to have COVID-19 PNA, subsequently intubated  and transferred to 5ICU.  - Sedated (propofol); Pressor support (levo)  - Proned ; supine     Source: EMR, Team    -If unable to interview patient: [x] Trach/Vent/BiPAP  [] Disoriented/confused/inappropriate to interview    Diet Order:   Diet, NPO with Tube Feed:   Tube Feeding Modality: Orogastric  Vital AF (VITALAFRTH)  Total Volume for 24 Hours (mL): 600  Continuous  Starting Tube Feed Rate {mL per Hour}: 10  Until Goal Tube Feed Rate (mL per Hour): 25  Tube Feed Duration (in Hours): 24  Tube Feed Start Time: 08:30 (21)    EN Order Provides: 600ml formula, 720kcals, 45g protein, 487ml free H2O  Current Pump Rate: 25ml/hr; 33% EN volume goal so far today  EN provision: 54% EN volume goal provided in past 2 days (since EN initiated)    Is current diet order appropriate/adequate? [] Yes  [x]  No: with propofol kcals, EN regimen meets energy needs, however, does not meet protein needs      Nutrition-Related Events:  - Propofol ordered -  if infusion continues at current rate (28.2 mL/hr), will provide 745 kcals/day      - Plan to add precedex today in efforts to wean off prop  - 5u NPH q6hrs + Insulin Sliding Scale to optimize BG; A1C 7.2% ()  - Currently receiving Dexamethasone  - EN initiated   - Prior to intubation, pt was noted to be eating well at meals w/out any changes in appetite/PO intake (per initial RD note 5/10)    GI:  Last BM .   Bowel Regimen? [x] Yes (miralax, senna)  [] No  - Noted pt on abx course at this time    Weights:   Daily Weight in k.3 (), 93.4 ()  - Noted weight fluctuations may be 2/2 fluid shifts as pt has received diuretic therapies in-house (last received lasix )    Drug Dosing Weight  Height (cm): 177.8 ()  Weight (kg): 95.3 ()  BMI (kg/m2): 30.1 ()  IBW: 166Ibs/75.2kg      MEDICATIONS  (STANDING):  atorvastatin  dexAMETHasone  Injectable  insulin lispro (ADMELOG) corrective regimen sliding scale  insulin NPH human recombinant  piperacillin/tazobactam IVPB..  polyethylene glycol 3350  senna Syrup    Pertinent Labs:  @ 23:58: Na 133<L>, BUN 30<H>, Cr 0.75, <H>, K+ 4.0, Phos 3.2, Mg 2.2, Alk Phos 84, ALT/SGPT 39, AST/SGOT 15, HbA1c --    A1C with Estimated Average Glucose Result: 7.2 % (21 @ 08:55)    Finger Sticks:  POCT Blood Glucose.: 164 mg/dL ( @ 04:51)  POCT Blood Glucose.: 256 mg/dL ( @ 17:17)  POCT Blood Glucose.: 305 mg/dL ( @ 13:46)  POCT Blood Glucose.: 319 mg/dL ( @ 10:39)  POCT Blood Glucose.: 346 mg/dL ( @ 10:38)        Skin per nursing documentation: no pressure injuries noted  Edema: none noted ()    Estimated Nutritional Needs: with consideration for intubation  & BMI >30  Energy Needs (14-18 kcals/kg): 2147-7469 - Based on dosing wt 95.3kg  Protein Needs (1.4-1.8 g/kg): 105-135 - Based on IBW 75.2kg  Huger State Equation (REE): 2183kcals (23kcals/kg per dosing wt)    Previous Nutrition Diagnosis: No active nutrition diagnosis identified at this time  Nutrition Diagnosis is: [] ongoing  [] resolved [x] not applicable     New Nutrition Diagnosis: inadequate protein intake  Related to: inadequate protein provided by EN  As evidenced by current EN providing ~50% protein needs    Nutrition Care Plan:  [x] In Progress  [] Achieved  [] Not applicable      Recommendations:      1. In setting of significant kcals provided by Propofol, recommend modifying EN regimen to initiate Vital High Protein at trickle rate and as medically feasible/tolerated advance to goal rate of 40mL/hr x 24hrs + 2 'No Carb Prosource TF'. At goal regimen to provide 960mL formula, 1040kcals, 106g protein, 803mL free H2O. Propofol infusions at current rate to provide an additional 745kcals/day. (whole regimen meets 18.7kcals/kg based on dosing wt 95.3kg & 1.4g protein/kg based on IBW 75.2kg).   2. Consider addition of Multivitamin & Vitamin D supplementation pending no existing contraindications; Please obtain serum Vitamin D levels as feasible.  3. Adjust insulin regimen PRN to maintain BG <180  4. Monitor GI tolerance and BM's; last documented BM   5. RD to remain available to adjust EN formulary, volume/rate PRN.     Monitoring and Evaluation:   Continue to monitor nutritional intake, tolerance to diet prescription, weights, labs, skin integrity  RD remains available upon request and will follow up per protocol    Peyton Kc, MS, RD, CDN, CNSC  pager #[phone]

## 2021-05-13 NOTE — PROGRESS NOTE ADULT - ATTENDING COMMENTS
60 year old man with history of DM, HTN and hyperlipidemia admitted 5/3 with covid infection. Intubated 5/11 for hypoxic respiratory failure and ARDS secondary to viral pneumonia from COVID infection. Initially required prone ventilation.      - oxygenation continues to improve  - remains off paralytics  - wean sedation for possible SBT  - adequate urine output, creatinine stable  - WBC improving, on empiric ABx cultures NGTD  - vasopressor requirements much improved   - hgb stable on DVT prophylaxis    Prognosis guarded

## 2021-05-14 LAB
ALBUMIN SERPL ELPH-MCNC: 2.6 G/DL — LOW (ref 3.3–5)
ALP SERPL-CCNC: 83 U/L — SIGNIFICANT CHANGE UP (ref 40–120)
ALT FLD-CCNC: 35 U/L — SIGNIFICANT CHANGE UP (ref 10–45)
ANION GAP SERPL CALC-SCNC: 13 MMOL/L — SIGNIFICANT CHANGE UP (ref 5–17)
AST SERPL-CCNC: 22 U/L — SIGNIFICANT CHANGE UP (ref 10–40)
BILIRUB SERPL-MCNC: 0.9 MG/DL — SIGNIFICANT CHANGE UP (ref 0.2–1.2)
BUN SERPL-MCNC: 32 MG/DL — HIGH (ref 7–23)
CALCIUM SERPL-MCNC: 8.1 MG/DL — LOW (ref 8.4–10.5)
CHLORIDE SERPL-SCNC: 100 MMOL/L — SIGNIFICANT CHANGE UP (ref 96–108)
CO2 SERPL-SCNC: 23 MMOL/L — SIGNIFICANT CHANGE UP (ref 22–31)
CREAT SERPL-MCNC: 0.65 MG/DL — SIGNIFICANT CHANGE UP (ref 0.5–1.3)
D DIMER BLD IA.RAPID-MCNC: 2040 NG/ML DDU — HIGH
FERRITIN SERPL-MCNC: 1054 NG/ML — HIGH (ref 30–400)
FIBRINOGEN PPP-MCNC: 1090 MG/DL — HIGH (ref 290–520)
GAS PNL BLDA: SIGNIFICANT CHANGE UP
GLUCOSE BLDC GLUCOMTR-MCNC: 115 MG/DL — HIGH (ref 70–99)
GLUCOSE BLDC GLUCOMTR-MCNC: 146 MG/DL — HIGH (ref 70–99)
GLUCOSE BLDC GLUCOMTR-MCNC: 159 MG/DL — HIGH (ref 70–99)
GLUCOSE BLDC GLUCOMTR-MCNC: 204 MG/DL — HIGH (ref 70–99)
GLUCOSE SERPL-MCNC: 133 MG/DL — HIGH (ref 70–99)
HCT VFR BLD CALC: 41 % — SIGNIFICANT CHANGE UP (ref 39–50)
HGB BLD-MCNC: 13.5 G/DL — SIGNIFICANT CHANGE UP (ref 13–17)
LDH SERPL L TO P-CCNC: 234 U/L — SIGNIFICANT CHANGE UP (ref 50–242)
MAGNESIUM SERPL-MCNC: 1.9 MG/DL — SIGNIFICANT CHANGE UP (ref 1.6–2.6)
MCHC RBC-ENTMCNC: 28.5 PG — SIGNIFICANT CHANGE UP (ref 27–34)
MCHC RBC-ENTMCNC: 32.9 GM/DL — SIGNIFICANT CHANGE UP (ref 32–36)
MCV RBC AUTO: 86.5 FL — SIGNIFICANT CHANGE UP (ref 80–100)
NRBC # BLD: 0 /100 WBCS — SIGNIFICANT CHANGE UP (ref 0–0)
PHOSPHATE SERPL-MCNC: 3 MG/DL — SIGNIFICANT CHANGE UP (ref 2.5–4.5)
PLATELET # BLD AUTO: 221 K/UL — SIGNIFICANT CHANGE UP (ref 150–400)
POTASSIUM SERPL-MCNC: 4.1 MMOL/L — SIGNIFICANT CHANGE UP (ref 3.5–5.3)
POTASSIUM SERPL-SCNC: 4.1 MMOL/L — SIGNIFICANT CHANGE UP (ref 3.5–5.3)
PROT SERPL-MCNC: 5.9 G/DL — LOW (ref 6–8.3)
RBC # BLD: 4.74 M/UL — SIGNIFICANT CHANGE UP (ref 4.2–5.8)
RBC # FLD: 12.1 % — SIGNIFICANT CHANGE UP (ref 10.3–14.5)
SODIUM SERPL-SCNC: 136 MMOL/L — SIGNIFICANT CHANGE UP (ref 135–145)
TROPONIN T, HIGH SENSITIVITY RESULT: 7 NG/L — SIGNIFICANT CHANGE UP (ref 0–51)
WBC # BLD: 14.54 K/UL — HIGH (ref 3.8–10.5)
WBC # FLD AUTO: 14.54 K/UL — HIGH (ref 3.8–10.5)

## 2021-05-14 PROCEDURE — 99291 CRITICAL CARE FIRST HOUR: CPT

## 2021-05-14 PROCEDURE — 71045 X-RAY EXAM CHEST 1 VIEW: CPT | Mod: 26

## 2021-05-14 PROCEDURE — 93010 ELECTROCARDIOGRAM REPORT: CPT

## 2021-05-14 RX ORDER — KETOROLAC TROMETHAMINE 30 MG/ML
15 SYRINGE (ML) INJECTION ONCE
Refills: 0 | Status: DISCONTINUED | OUTPATIENT
Start: 2021-05-14 | End: 2021-05-14

## 2021-05-14 RX ORDER — FUROSEMIDE 40 MG
40 TABLET ORAL ONCE
Refills: 0 | Status: COMPLETED | OUTPATIENT
Start: 2021-05-14 | End: 2021-05-14

## 2021-05-14 RX ORDER — ACETAMINOPHEN 500 MG
1000 TABLET ORAL ONCE
Refills: 0 | Status: COMPLETED | OUTPATIENT
Start: 2021-05-14 | End: 2021-05-14

## 2021-05-14 RX ADMIN — PIPERACILLIN AND TAZOBACTAM 25 GRAM(S): 4; .5 INJECTION, POWDER, LYOPHILIZED, FOR SOLUTION INTRAVENOUS at 09:24

## 2021-05-14 RX ADMIN — Medication 15 MILLIGRAM(S): at 03:36

## 2021-05-14 RX ADMIN — Medication 4: at 12:10

## 2021-05-14 RX ADMIN — Medication 400 MILLIGRAM(S): at 05:21

## 2021-05-14 RX ADMIN — Medication 15 MILLIGRAM(S): at 15:32

## 2021-05-14 RX ADMIN — PIPERACILLIN AND TAZOBACTAM 25 GRAM(S): 4; .5 INJECTION, POWDER, LYOPHILIZED, FOR SOLUTION INTRAVENOUS at 17:39

## 2021-05-14 RX ADMIN — CHLORHEXIDINE GLUCONATE 1 APPLICATION(S): 213 SOLUTION TOPICAL at 05:22

## 2021-05-14 RX ADMIN — Medication 1000 MILLIGRAM(S): at 16:17

## 2021-05-14 RX ADMIN — Medication 2: at 05:31

## 2021-05-14 RX ADMIN — PIPERACILLIN AND TAZOBACTAM 25 GRAM(S): 4; .5 INJECTION, POWDER, LYOPHILIZED, FOR SOLUTION INTRAVENOUS at 01:12

## 2021-05-14 RX ADMIN — Medication 1000 MILLIGRAM(S): at 06:54

## 2021-05-14 RX ADMIN — PANTOPRAZOLE SODIUM 40 MILLIGRAM(S): 20 TABLET, DELAYED RELEASE ORAL at 12:07

## 2021-05-14 RX ADMIN — Medication 1000 MILLIGRAM(S): at 23:30

## 2021-05-14 RX ADMIN — Medication 400 MILLIGRAM(S): at 15:55

## 2021-05-14 RX ADMIN — Medication 400 MILLIGRAM(S): at 23:15

## 2021-05-14 RX ADMIN — Medication 40 MILLIGRAM(S): at 02:13

## 2021-05-14 RX ADMIN — Medication 15 MILLIGRAM(S): at 15:55

## 2021-05-14 RX ADMIN — ENOXAPARIN SODIUM 40 MILLIGRAM(S): 100 INJECTION SUBCUTANEOUS at 12:08

## 2021-05-14 RX ADMIN — Medication 6 MILLIGRAM(S): at 05:22

## 2021-05-14 RX ADMIN — Medication 40 MILLIGRAM(S): at 12:08

## 2021-05-14 RX ADMIN — Medication 15 MILLIGRAM(S): at 04:20

## 2021-05-14 NOTE — PROGRESS NOTE ADULT - ATTENDING COMMENTS
60 year old man with history of DM, HTN and hyperlipidemia admitted 5/3 with covid infection. Intubated 5/11 for hypoxic respiratory failure and ARDS secondary to viral pneumonia from COVID infection. Initially required prone ventilation.      - extubated to BiPAP yesterday  - Awake and alert able to communicate  - continue to wean down FiO2  - adequate urine output, creatinine stable  - WBC continues to improve, cultures NGTD  - off vasopressors    - hgb stable on DVT prophylaxis    Prognosis guarded

## 2021-05-14 NOTE — PROGRESS NOTE ADULT - SUBJECTIVE AND OBJECTIVE BOX
CHIEF COMPLAINT:  Patient is a 60y old  Male who presents with a chief complaint of shortness of breath (13 May 2021 06:15)    HPI:  59 yo M with PMH of HTN, HLD, T2DM, presents here with SOB and hypoxia.  Patient started feeling fatigue and malaise on Monday.  Over the last three days, he also started having decreased appetite, mild cough, SOB, feverish, chills.  He has been mostly in bed, not able to do his usual ADLs.  This morning he had an episode of vomiting.  He was also having some back pain, which is worse with walking.  He had no diarrhea.  Daughter-in-law checked his pulse ox at home which was 88%, which prompted her to bring him to hospital.  While in ED, patient felt lethargic and had a brief 20 sec period of unresponsiveness.  Patient denies losing consciousness; states he remembers hearing his daughter-in-law calling his name, but he did not respond because he was not feeling well.  In ED, his vitals showed temp 98.5, HR 89, /70, RR 19-26, saturation 88% on RA.   (03 May 2021 03:11)      Interval Events:c/o chest pain overnight EKG unremarkable Protonix given tylenol and toradol givenFio2 increased to 100%    REVIEW OF SYSTEMS: unremarkable          OBJECTIVE:  ICU Vital Signs Last 24 Hrs  T(C): 38 (14 May 2021 04:00), Max: 38.4 (13 May 2021 11:00)  T(F): 100.4 (14 May 2021 04:00), Max: 101.1 (13 May 2021 11:00)  HR: 79 (14 May 2021 06:00) (70 - 94)  BP: 118/69 (13 May 2021 19:00) (99/55 - 129/59)  BP(mean): 88 (13 May 2021 19:00) (72 - 94)  ABP: 114/58 (14 May 2021 06:00) (90/46 - 142/62)  ABP(mean): 75 (14 May 2021 06:00) (59 - 86)  RR: 25 (14 May 2021 06:00) (16 - 34)  SpO2: 94% (14 May 2021 06:00) (89% - 98%)    Mode: CPAP with PS, FiO2: 50, PEEP: 8, PS: 10, MAP: 12, PIP: 19    05-13 @ 07:01  -  05-14 @ 07:00  --------------------------------------------------------  IN: 883.5 mL / OUT: 2615 mL / NET: -1731.5 mL      CAPILLARY BLOOD GLUCOSE      POCT Blood Glucose.: 159 mg/dL (14 May 2021 05:30)          PHYSICAL EXAM:  GENERAL: NAD,  HEENT:  Atraumatic, Normocephalic  EYES: PERRLA, conjunctiva and sclera clear  NECK: Supple, No JVD  CHEST/LUNG: diminished bilaterally; No wheezes, rales, or rhonchi  HEART: Regular rate and rhythm; No murmurs, rubs, or gallops  ABDOMEN: Soft, Nontender, Nondistended; Bowel sounds present  EXTREMITIES:  2+ Peripheral Pulses, No clubbing, cyanosis, or edema  PSYCH: unable as pt is sedated  NEUROLOGY: A+O MAEx4  SKIN: No rashes or lesions        HOSPITAL MEDICATIONS:  MEDICATIONS  (STANDING):  atorvastatin 20 milliGRAM(s) Oral at bedtime  chlorhexidine 4% Liquid 1 Application(s) Topical <User Schedule>  cholecalciferol 1000 Unit(s) Oral daily  dexAMETHasone  Injectable 6 milliGRAM(s) IV Push daily  dexMEDEtomidine Infusion 1.5 MICROgram(s)/kG/Hr (35.7 mL/Hr) IV Continuous <Continuous>  enoxaparin Injectable 40 milliGRAM(s) SubCutaneous daily  insulin lispro (ADMELOG) corrective regimen sliding scale   SubCutaneous every 6 hours  pantoprazole  Injectable 40 milliGRAM(s) IV Push daily  piperacillin/tazobactam IVPB.. 3.375 Gram(s) IV Intermittent every 8 hours  polyethylene glycol 3350 17 Gram(s) Oral two times a day  senna Syrup 10 milliLiter(s) Oral at bedtime    MEDICATIONS  (PRN):  acetaminophen   Tablet .. 650 milliGRAM(s) Oral every 6 hours PRN Temp greater or equal to 38C (100.4F), Mild Pain (1 - 3)  ALBUTerol    90 MICROgram(s) HFA Inhaler 1 Puff(s) Inhalation every 4 hours PRN Shortness of Breath      LABS:                        13.5   14.54 )-----------( 221      ( 14 May 2021 00:21 )             41.0     Hgb Trend: 13.5<--, 14.0<--, 14.7<--, 15.0<--, 14.9<--  05-14    136  |  100  |  32<H>  ----------------------------<  133<H>  4.1   |  23  |  0.65    Ca    8.1<L>      14 May 2021 00:21  Phos  3.0     05-14  Mg     1.9     05-14    TPro  5.9<L>  /  Alb  2.6<L>  /  TBili  0.9  /  DBili  x   /  AST  22  /  ALT  35  /  AlkPhos  83  05-14    LIVER FUNCTIONS - ( 14 May 2021 00:21 )  Alb: 2.6 g/dL / Pro: 5.9 g/dL / ALK PHOS: 83 U/L / ALT: 35 U/L / AST: 22 U/L / GGT: x           Creatinine Trend: 0.65<--, 0.72<--, 0.75<--, 0.71<--, 0.71<--, 0.80<--      Arterial Blood Gas:  05-14 @ 02:31  7.46/39/64/27/92/3.8  ABG lactate: --  Arterial Blood Gas:  05-14 @ 00:15  7.47/39/55/28/88/4.5  ABG lactate: --  Arterial Blood Gas:  05-13 @ 18:07  7.47/36/71/26/94/2.7  ABG lactate: --  Arterial Blood Gas:  05-13 @ 12:04  7.47/36/128/26/98/3.0  ABG lactate: --  Arterial Blood Gas:  05-13 @ 08:43  7.45/35/86/24/96/.8  ABG lactate: --  Arterial Blood Gas:  05-13 @ 07:11  7.45/37/73/25/94/2.2  ABG lactate: --  Arterial Blood Gas:  05-12 @ 23:57  7.45/37/71/26/93/2.1  ABG lactate: --  Arterial Blood Gas:  05-12 @ 13:16  7.46/32/174/23/99/.2  ABG lactate: --  Arterial Blood Gas:  05-12 @ 10:47  7.50/30/155/23/99/1.2  ABG lactate: --        MICROBIOLOGY:     RADIOLOGY:  [ ] Reviewed and interpreted by me    EKG:

## 2021-05-14 NOTE — PROGRESS NOTE ADULT - ASSESSMENT
59 yo M with medical history of HTN, DM and hyperlipidemia who was admitted initially for shortness of breath for few days and hypoxemia of 88% on RA, patient was found to have COVID-19 associated pneumonia, he was started on supplemental O2 along with Decadron and Remdisivir. Patient's condition gradually deteriorated to the point he required HFNC then BPAP, today pt was intubated as his SpO2 went down to low 80s% despite all non-invasive measures. In the ICU, patient was placed on V-AC mode with settings of 32/450/14/100, PEEP was up titrated gradually. He remained asynchronous with the ventilator despite adequate sedation, for which Nimbex drip was initiated. Also, his BP went down after high dose propofol, RIJ TLC with A line were inserted.     5/3 Admitted to [facility]  5/11 Intubated transfered to 5 ICU and proned  5/12 supined  5/13: New Pneumomediastinum  5/13: ectubated to bipap 10/5 50%    Neuro:   Weaned off sedation   Following commands to all extremities    CV:   - Hx of HTN now off pressors and able to maintain maps >65      Resp:   AC: 450/26/10/45-->cpap 12/10---> 5/13extubated to BIPAP 12/5 100%  -s/p proned on 5/11 and did well after 1 proning no longer requiries it.  -Now with new Pneumomediastinal subcue air, no PTX-daily cxr  - Repeat abg this am and start to wean fio2 if able  -self prone if needed  -Incentive spirometry    GI:   -NPO currently as not able to place kaofeed as pt too unstable to remove bipap  -c/w bowel regimen when kaofeed tube is placed  - GI PPX    Renal:   -Boss in place uo -60cc/h  s/p lasix 5/11  trend electrolytes    Heme:   -Lovenox qd   H+H stable    ID:   On empiric Zosyn day 3/5  BCx x 2 sent on 5/11--neg. off ABx   -completed decadron (5/4-5/14 )   -completed remdesivir 5/3-5/7     Endo:   -NPH on hold since pt is NPO  -210       61 yo M with medical history of HTN, DM and hyperlipidemia who was admitted initially for shortness of breath for few days and hypoxemia of 88% on RA, patient was found to have COVID-19 associated pneumonia, he was started on supplemental O2 along with Decadron and Remdisivir. Patient's condition gradually deteriorated to the point he required HFNC then BPAP, today pt was intubated as his SpO2 went down to low 80s% despite all non-invasive measures. In the ICU, patient was placed on V-AC mode with settings of 32/450/14/100, PEEP was up titrated gradually. He remained asynchronous with the ventilator despite adequate sedation, for which Nimbex drip was initiated. Also, his BP went down after high dose propofol, RIJ TLC with A line were inserted.     5/3 Admitted to [facility]  5/11 Intubated transfered to 5 ICU and proned  5/12 supined  5/13: New Pneumomediastinum  5/13: ectubated to bipap 10/5 50%    Neuro:   Weaned off sedation   Following commands to all extremities    CV:   - Hx of HTN now off pressors and able to maintain maps >65      Resp:   AC: 450/26/10/45-->cpap 12/10---> 5/13extubated to BIPAP 12/5 100%  -s/p proned on 5/11 and did well after 1 proning no longer requiries it.  -Now with new Pneumomediastinal subcue air, no PTX-daily cxr  - Repeat abg this am and start to wean fio2 if able  -self prone if needed  -Incentive spirometry    GI:   -NPO currently as not able to place kaofeed as pt too unstable to remove bipap  -c/w bowel regimen when kaofeed tube is placed  - GI PPX    Renal:   -Boss in place uo -70-75cc/h  s/p lasix 5/11  trend electrolytes    Heme:   -Lovenox qd   H+H stable    ID:   On empiric Zosyn day 3/5  BCx x 2 sent on 5/11--neg. off ABx   -completed decadron (5/4-5/14 )   -completed remdesivir 5/3-5/7     Endo:   -NPH on hold since pt is NPO  -206

## 2021-05-15 LAB
ALBUMIN SERPL ELPH-MCNC: 2.6 G/DL — LOW (ref 3.3–5)
ALP SERPL-CCNC: 106 U/L — SIGNIFICANT CHANGE UP (ref 40–120)
ALT FLD-CCNC: 42 U/L — SIGNIFICANT CHANGE UP (ref 10–45)
ANION GAP SERPL CALC-SCNC: 13 MMOL/L — SIGNIFICANT CHANGE UP (ref 5–17)
AST SERPL-CCNC: 38 U/L — SIGNIFICANT CHANGE UP (ref 10–40)
BILIRUB SERPL-MCNC: 1 MG/DL — SIGNIFICANT CHANGE UP (ref 0.2–1.2)
BUN SERPL-MCNC: 41 MG/DL — HIGH (ref 7–23)
CALCIUM SERPL-MCNC: 8.7 MG/DL — SIGNIFICANT CHANGE UP (ref 8.4–10.5)
CHLORIDE SERPL-SCNC: 96 MMOL/L — SIGNIFICANT CHANGE UP (ref 96–108)
CO2 SERPL-SCNC: 24 MMOL/L — SIGNIFICANT CHANGE UP (ref 22–31)
CREAT SERPL-MCNC: 0.78 MG/DL — SIGNIFICANT CHANGE UP (ref 0.5–1.3)
GAS PNL BLDA: SIGNIFICANT CHANGE UP
GLUCOSE BLDC GLUCOMTR-MCNC: 164 MG/DL — HIGH (ref 70–99)
GLUCOSE BLDC GLUCOMTR-MCNC: 171 MG/DL — HIGH (ref 70–99)
GLUCOSE SERPL-MCNC: 141 MG/DL — HIGH (ref 70–99)
HCT VFR BLD CALC: 44.3 % — SIGNIFICANT CHANGE UP (ref 39–50)
HGB BLD-MCNC: 14.3 G/DL — SIGNIFICANT CHANGE UP (ref 13–17)
MAGNESIUM SERPL-MCNC: 2.4 MG/DL — SIGNIFICANT CHANGE UP (ref 1.6–2.6)
MCHC RBC-ENTMCNC: 28 PG — SIGNIFICANT CHANGE UP (ref 27–34)
MCHC RBC-ENTMCNC: 32.3 GM/DL — SIGNIFICANT CHANGE UP (ref 32–36)
MCV RBC AUTO: 86.9 FL — SIGNIFICANT CHANGE UP (ref 80–100)
NRBC # BLD: 0 /100 WBCS — SIGNIFICANT CHANGE UP (ref 0–0)
PHOSPHATE SERPL-MCNC: 4 MG/DL — SIGNIFICANT CHANGE UP (ref 2.5–4.5)
PLATELET # BLD AUTO: 244 K/UL — SIGNIFICANT CHANGE UP (ref 150–400)
POTASSIUM SERPL-MCNC: 3.9 MMOL/L — SIGNIFICANT CHANGE UP (ref 3.5–5.3)
POTASSIUM SERPL-SCNC: 3.9 MMOL/L — SIGNIFICANT CHANGE UP (ref 3.5–5.3)
PROT SERPL-MCNC: 6.6 G/DL — SIGNIFICANT CHANGE UP (ref 6–8.3)
RBC # BLD: 5.1 M/UL — SIGNIFICANT CHANGE UP (ref 4.2–5.8)
RBC # FLD: 12.1 % — SIGNIFICANT CHANGE UP (ref 10.3–14.5)
SODIUM SERPL-SCNC: 133 MMOL/L — LOW (ref 135–145)
WBC # BLD: 14.66 K/UL — HIGH (ref 3.8–10.5)
WBC # FLD AUTO: 14.66 K/UL — HIGH (ref 3.8–10.5)

## 2021-05-15 PROCEDURE — 99291 CRITICAL CARE FIRST HOUR: CPT

## 2021-05-15 PROCEDURE — 71045 X-RAY EXAM CHEST 1 VIEW: CPT | Mod: 26,77,76

## 2021-05-15 PROCEDURE — 71045 X-RAY EXAM CHEST 1 VIEW: CPT | Mod: 26

## 2021-05-15 RX ORDER — POTASSIUM CHLORIDE 20 MEQ
10 PACKET (EA) ORAL ONCE
Refills: 0 | Status: COMPLETED | OUTPATIENT
Start: 2021-05-15 | End: 2021-05-15

## 2021-05-15 RX ORDER — CALCIUM GLUCONATE 100 MG/ML
2 VIAL (ML) INTRAVENOUS ONCE
Refills: 0 | Status: COMPLETED | OUTPATIENT
Start: 2021-05-15 | End: 2021-05-15

## 2021-05-15 RX ORDER — ACETAMINOPHEN 500 MG
1000 TABLET ORAL ONCE
Refills: 0 | Status: COMPLETED | OUTPATIENT
Start: 2021-05-15 | End: 2021-05-15

## 2021-05-15 RX ORDER — HYDROMORPHONE HYDROCHLORIDE 2 MG/ML
0.5 INJECTION INTRAMUSCULAR; INTRAVENOUS; SUBCUTANEOUS ONCE
Refills: 0 | Status: DISCONTINUED | OUTPATIENT
Start: 2021-05-15 | End: 2021-05-15

## 2021-05-15 RX ORDER — FUROSEMIDE 40 MG
40 TABLET ORAL ONCE
Refills: 0 | Status: COMPLETED | OUTPATIENT
Start: 2021-05-15 | End: 2021-05-15

## 2021-05-15 RX ORDER — KETOROLAC TROMETHAMINE 30 MG/ML
15 SYRINGE (ML) INJECTION EVERY 12 HOURS
Refills: 0 | Status: COMPLETED | OUTPATIENT
Start: 2021-05-15 | End: 2021-05-20

## 2021-05-15 RX ADMIN — Medication 200 GRAM(S): at 13:00

## 2021-05-15 RX ADMIN — CHLORHEXIDINE GLUCONATE 1 APPLICATION(S): 213 SOLUTION TOPICAL at 06:30

## 2021-05-15 RX ADMIN — Medication 50 MILLIEQUIVALENT(S): at 13:00

## 2021-05-15 RX ADMIN — Medication 1 MILLIGRAM(S): at 22:30

## 2021-05-15 RX ADMIN — PIPERACILLIN AND TAZOBACTAM 25 GRAM(S): 4; .5 INJECTION, POWDER, LYOPHILIZED, FOR SOLUTION INTRAVENOUS at 09:43

## 2021-05-15 RX ADMIN — ENOXAPARIN SODIUM 40 MILLIGRAM(S): 100 INJECTION SUBCUTANEOUS at 11:41

## 2021-05-15 RX ADMIN — Medication 4: at 18:01

## 2021-05-15 RX ADMIN — PIPERACILLIN AND TAZOBACTAM 25 GRAM(S): 4; .5 INJECTION, POWDER, LYOPHILIZED, FOR SOLUTION INTRAVENOUS at 17:40

## 2021-05-15 RX ADMIN — HYDROMORPHONE HYDROCHLORIDE 0.5 MILLIGRAM(S): 2 INJECTION INTRAMUSCULAR; INTRAVENOUS; SUBCUTANEOUS at 06:47

## 2021-05-15 RX ADMIN — Medication 1000 MILLIGRAM(S): at 12:45

## 2021-05-15 RX ADMIN — PIPERACILLIN AND TAZOBACTAM 25 GRAM(S): 4; .5 INJECTION, POWDER, LYOPHILIZED, FOR SOLUTION INTRAVENOUS at 02:08

## 2021-05-15 RX ADMIN — Medication 40 MILLIGRAM(S): at 23:14

## 2021-05-15 RX ADMIN — HYDROMORPHONE HYDROCHLORIDE 0.5 MILLIGRAM(S): 2 INJECTION INTRAMUSCULAR; INTRAVENOUS; SUBCUTANEOUS at 06:32

## 2021-05-15 RX ADMIN — PANTOPRAZOLE SODIUM 40 MILLIGRAM(S): 20 TABLET, DELAYED RELEASE ORAL at 11:41

## 2021-05-15 RX ADMIN — POLYETHYLENE GLYCOL 3350 17 GRAM(S): 17 POWDER, FOR SOLUTION ORAL at 17:40

## 2021-05-15 RX ADMIN — Medication 40 MILLIGRAM(S): at 13:01

## 2021-05-15 RX ADMIN — Medication 2: at 11:42

## 2021-05-15 RX ADMIN — Medication 400 MILLIGRAM(S): at 12:25

## 2021-05-15 RX ADMIN — Medication 40 MILLIGRAM(S): at 21:26

## 2021-05-15 RX ADMIN — Medication 2: at 05:49

## 2021-05-15 NOTE — PROGRESS NOTE ADULT - SUBJECTIVE AND OBJECTIVE BOX
HISTORY  59 yo M with PMH of HTN, HLD, T2DM, presents here with SOB and hypoxia.  Patient started feeling fatigue and malaise on Monday.  Over the last three days, he also started having decreased appetite, mild cough, SOB, feverish, chills.  He has been mostly in bed, not able to do his usual ADLs.  This morning he had an episode of vomiting.  He was also having some back pain, which is worse with walking.  He had no diarrhea.  Daughter-in-law checked his pulse ox at home which was 88%, which prompted her to bring him to hospital.  While in ED, patient felt lethargic and had a brief 20 sec period of unresponsiveness.  Patient denies losing consciousness; states he remembers hearing his daughter-in-law calling his name, but he did not respond because he was not feeling well.  In ED, his vitals showed temp 98.5, HR 89, /70, RR 19-26, saturation 88% on RA.    24 HOUR EVENTS:  Pt remained on BiPAP 12/6/70%, satting 88%. Pt c/o pleuritic chest pain overnight, started on Solu-medrol 40mg TID x2 days.    SUBJECTIVE/ROS:  [x] A ten-point review of systems was otherwise negative except as noted.  [ ] Due to altered mental status/intubation, subjective information were not able to be obtained from the patient. History was obtained, to the extent possible, from review of the chart and collateral sources of information.      NEURO  RASS: 0    GCS: 15    CAM ICU: Negative  Exam: awake, alert, oriented  Meds: acetaminophen   Tablet .. 650 milliGRAM(s) Oral every 6 hours PRN Temp greater or equal to 38C (100.4F), Mild Pain (1 - 3)  dexMEDEtomidine Infusion 1.5 MICROgram(s)/kG/Hr IV Continuous <Continuous>    [x] Adequacy of sedation and pain control has been assessed and adjusted      RESPIRATORY  RR: 29 (05-15-21 @ 06:00) (20 - 33)  SpO2: 91% (05-15-21 @ 06:00) (90% - 97%)  Wt(kg): --  Exam: slightly diminished breath sounds b/l, no w/r/r  Mechanical Ventilation: BiPAP 12/6/70%  ABG - ( 15 May 2021 00:04 )  pH: 7.47  /  pCO2: 42    /  pO2: 71    / HCO3: 30    / Base Excess: 6.4   /  SaO2: 93      Lactate: x                [N/A] Extubation Readiness Assessed  Meds: ALBUTerol    90 MICROgram(s) HFA Inhaler 1 Puff(s) Inhalation every 4 hours PRN Shortness of Breath        CARDIOVASCULAR  HR: 86 (05-15-21 @ 06:00) (72 - 97)  BP: --  BP(mean): --  ABP: 127/57 (05-15-21 @ 06:00) (109/55 - 164/68)  ABP(mean): 77 (05-15-21 @ 06:00) (73 - 104)  Wt(kg): --  CVP(cm H2O): --      Exam: regular rate and rhythm  Cardiac Rhythm: sinus  Perfusion     [x]Adequate   [ ]Inadequate  Mentation   [x]Normal       [ ]Reduced  Extremities  [x]Warm         [ ]Cool  Volume Status [ ]Hypervolemic [x]Euvolemic [ ]Hypovolemic  Meds:       GI/NUTRITION  Exam: soft, nontender, nondistended  Diet: NPO while on BiPAP  Meds: pantoprazole  Injectable 40 milliGRAM(s) IV Push daily  polyethylene glycol 3350 17 Gram(s) Oral two times a day  senna Syrup 10 milliLiter(s) Oral at bedtime      GENITOURINARY  I&O's Detail    05-14 @ 07:01  -  05-15 @ 07:00  --------------------------------------------------------  IN:    IV PiggyBack: 575 mL  Total IN: 575 mL    OUT:    Dexmedetomidine: 0 mL    Incontinent per Condom Catheter (mL): 0 mL    Intermittent Catheterization - Urethral (mL): 650 mL    Oral Fluid: 0 mL    Voided (mL): 1960 mL  Total OUT: 2610 mL    Total NET: -2035 mL          05-15    133<L>  |  96  |  41<H>  ----------------------------<  141<H>  3.9   |  24  |  0.78    Ca    8.7      15 May 2021 00:05  Phos  4.0     05-15  Mg     2.4     05-15    TPro  6.6  /  Alb  2.6<L>  /  TBili  1.0  /  DBili  x   /  AST  38  /  ALT  42  /  AlkPhos  106  05-15    Meds: cholecalciferol 1000 Unit(s) Oral daily        HEMATOLOGIC  Meds: enoxaparin Injectable 40 milliGRAM(s) SubCutaneous daily    [x] VTE Prophylaxis                        14.3   14.66 )-----------( 244      ( 15 May 2021 00:05 )             44.3       Transfusion     [ ] PRBC   [ ] Platelets   [ ] FFP   [ ] Cryoprecipitate      INFECTIOUS DISEASES  WBC Count: 14.66 K/uL (05-15 @ 00:05)    RECENT CULTURES:  Specimen Source: .Blood Blood-Peripheral  Date/Time: 05-11 @ 08:23  Culture Results:   No growth to date.  Gram Stain: --  Organism: --    Meds: piperacillin/tazobactam IVPB.. 3.375 Gram(s) IV Intermittent every 8 hours        ENDOCRINE  CAPILLARY BLOOD GLUCOSE      POCT Blood Glucose.: 171 mg/dL (15 May 2021 05:47)  POCT Blood Glucose.: 146 mg/dL (14 May 2021 17:37)  POCT Blood Glucose.: 204 mg/dL (14 May 2021 12:09)    Meds: atorvastatin 20 milliGRAM(s) Oral at bedtime  insulin lispro (ADMELOG) corrective regimen sliding scale   SubCutaneous every 6 hours  methylPREDNISolone sodium succinate Injectable 40 milliGRAM(s) IV Push every 8 hours        ACCESS DEVICES:  [x] Peripheral IV  [ ] Central Venous Line	[ ] R	[ ] L	[ ] IJ	[ ] Fem	[ ] SC	Placed:   [x] Arterial Line		[x] R	[ ] L	[ ] Fem	[x] Rad	[ ] Ax	Placed: 5/11  [ ] PICC:					[ ] Mediport  [ ] Urinary Catheter, Date Placed:   [x] Necessity of urinary, arterial, and venous catheters discussed    OTHER MEDICATIONS:  chlorhexidine 4% Liquid 1 Application(s) Topical <User Schedule>      CODE STATUS:  Full Code    IMAGING: COVID ICU Note    HISTORY  61 yo M with PMH of HTN, HLD, T2DM, presents here with SOB and hypoxia.  Patient started feeling fatigue and malaise on Monday.  Over the last three days, he also started having decreased appetite, mild cough, SOB, feverish, chills.  He has been mostly in bed, not able to do his usual ADLs.  This morning he had an episode of vomiting.  He was also having some back pain, which is worse with walking.  He had no diarrhea.  Daughter-in-law checked his pulse ox at home which was 88%, which prompted her to bring him to hospital.  While in ED, patient felt lethargic and had a brief 20 sec period of unresponsiveness.  Patient denies losing consciousness; states he remembers hearing his daughter-in-law calling his name, but he did not respond because he was not feeling well.  In ED, his vitals showed temp 98.5, HR 89, /70, RR 19-26, saturation 88% on RA.    24 HOUR EVENTS:  Pt remained on BiPAP 12/6/70%, satting 88%. Pt c/o pleuritic chest pain overnight, started on Solu-medrol 40mg TID x2 days.    SUBJECTIVE/ROS:  [x] A ten-point review of systems was otherwise negative except as noted.  [ ] Due to altered mental status/intubation, subjective information were not able to be obtained from the patient. History was obtained, to the extent possible, from review of the chart and collateral sources of information.      NEURO  RASS: 0    GCS: 15    CAM ICU: Negative  Exam: awake, alert, oriented  Meds: acetaminophen   Tablet .. 650 milliGRAM(s) Oral every 6 hours PRN Temp greater or equal to 38C (100.4F), Mild Pain (1 - 3)  dexMEDEtomidine Infusion 1.5 MICROgram(s)/kG/Hr IV Continuous <Continuous>    [x] Adequacy of sedation and pain control has been assessed and adjusted      RESPIRATORY  RR: 29 (05-15-21 @ 06:00) (20 - 33)  SpO2: 91% (05-15-21 @ 06:00) (90% - 97%)  Wt(kg): --  Exam: slightly diminished breath sounds b/l, no w/r/r  Mechanical Ventilation: BiPAP 12/6/70%  ABG - ( 15 May 2021 00:04 )  pH: 7.47  /  pCO2: 42    /  pO2: 71    / HCO3: 30    / Base Excess: 6.4   /  SaO2: 93      Lactate: x                [N/A] Extubation Readiness Assessed  Meds: ALBUTerol    90 MICROgram(s) HFA Inhaler 1 Puff(s) Inhalation every 4 hours PRN Shortness of Breath        CARDIOVASCULAR  HR: 86 (05-15-21 @ 06:00) (72 - 97)  BP: --  BP(mean): --  ABP: 127/57 (05-15-21 @ 06:00) (109/55 - 164/68)  ABP(mean): 77 (05-15-21 @ 06:00) (73 - 104)  Wt(kg): --  CVP(cm H2O): --      Exam: regular rate and rhythm  Cardiac Rhythm: sinus  Perfusion     [x]Adequate   [ ]Inadequate  Mentation   [x]Normal       [ ]Reduced  Extremities  [x]Warm         [ ]Cool  Volume Status [ ]Hypervolemic [x]Euvolemic [ ]Hypovolemic  Meds:       GI/NUTRITION  Exam: soft, nontender, nondistended  Diet: NPO while on BiPAP  Meds: pantoprazole  Injectable 40 milliGRAM(s) IV Push daily  polyethylene glycol 3350 17 Gram(s) Oral two times a day  senna Syrup 10 milliLiter(s) Oral at bedtime      GENITOURINARY  I&O's Detail    05-14 @ 07:01  -  05-15 @ 07:00  --------------------------------------------------------  IN:    IV PiggyBack: 575 mL  Total IN: 575 mL    OUT:    Dexmedetomidine: 0 mL    Incontinent per Condom Catheter (mL): 0 mL    Intermittent Catheterization - Urethral (mL): 650 mL    Oral Fluid: 0 mL    Voided (mL): 1960 mL  Total OUT: 2610 mL    Total NET: -2035 mL          05-15    133<L>  |  96  |  41<H>  ----------------------------<  141<H>  3.9   |  24  |  0.78    Ca    8.7      15 May 2021 00:05  Phos  4.0     05-15  Mg     2.4     05-15    TPro  6.6  /  Alb  2.6<L>  /  TBili  1.0  /  DBili  x   /  AST  38  /  ALT  42  /  AlkPhos  106  05-15    Meds: cholecalciferol 1000 Unit(s) Oral daily        HEMATOLOGIC  Meds: enoxaparin Injectable 40 milliGRAM(s) SubCutaneous daily    [x] VTE Prophylaxis                        14.3   14.66 )-----------( 244      ( 15 May 2021 00:05 )             44.3       Transfusion     [ ] PRBC   [ ] Platelets   [ ] FFP   [ ] Cryoprecipitate      INFECTIOUS DISEASES  WBC Count: 14.66 K/uL (05-15 @ 00:05)    RECENT CULTURES:  Specimen Source: .Blood Blood-Peripheral  Date/Time: 05-11 @ 08:23  Culture Results:   No growth to date.  Gram Stain: --  Organism: --    Meds: piperacillin/tazobactam IVPB.. 3.375 Gram(s) IV Intermittent every 8 hours        ENDOCRINE  CAPILLARY BLOOD GLUCOSE      POCT Blood Glucose.: 171 mg/dL (15 May 2021 05:47)  POCT Blood Glucose.: 146 mg/dL (14 May 2021 17:37)  POCT Blood Glucose.: 204 mg/dL (14 May 2021 12:09)    Meds: atorvastatin 20 milliGRAM(s) Oral at bedtime  insulin lispro (ADMELOG) corrective regimen sliding scale   SubCutaneous every 6 hours  methylPREDNISolone sodium succinate Injectable 40 milliGRAM(s) IV Push every 8 hours        ACCESS DEVICES:  [x] Peripheral IV  [ ] Central Venous Line	[ ] R	[ ] L	[ ] IJ	[ ] Fem	[ ] SC	Placed:   [x] Arterial Line		[x] R	[ ] L	[ ] Fem	[x] Rad	[ ] Ax	Placed: 5/11  [ ] PICC:					[ ] Mediport  [ ] Urinary Catheter, Date Placed:   [x] Necessity of urinary, arterial, and venous catheters discussed    OTHER MEDICATIONS:  chlorhexidine 4% Liquid 1 Application(s) Topical <User Schedule>      CODE STATUS:  Full Code    IMAGING:

## 2021-05-15 NOTE — PHYSICAL THERAPY INITIAL EVALUATION ADULT - PERTINENT HX OF CURRENT PROBLEM, REHAB EVAL
59 yo M with PMH of HTN, HLD, T2DM, presents here with SOB and hypoxia.  Patient started feeling fatigue and malaise on Monday.

## 2021-05-15 NOTE — PROGRESS NOTE ADULT - ASSESSMENT
59 yo M with medical history of HTN, DM and hyperlipidemia who was admitted initially for shortness of breath for few days and hypoxemia of 88% on RA, patient was found to have COVID-19 associated pneumonia, he was started on supplemental O2 along with Decadron and Remdisivir. Patient's condition gradually deteriorated to the point he required HFNC then BPAP, today pt was intubated as his SpO2 went down to low 80s% despite all non-invasive measures. In the ICU, patient was placed on V-AC mode with settings of 32/450/14/100, PEEP was up titrated gradually. He remained asynchronous with the ventilator despite adequate sedation, for which Nimbex drip was initiated. Also, his BP went down after high dose propofol, RIJ TLC with A line were inserted.     5/3 Admitted to [facility]  5/11 Intubated transferred to 5 ICU and proned  5/12 supinated  5/13: New Pneumomediastinum  5/13: extubated to BiPAP 12/6/70%    Neuro:   Weaned off sedation   Following commands to all extremities    CV:   - Hx of HTN now off pressors and able to maintain maps >65      Resp:   AC: 450/26/10/45-->cpap 12/10---> 5/13extubated to BIPAP 12/6 70%  - s/p proned on 5/11 and did well after 1 proning no longer requires it.  - Now with new Pneumomediastinal subq air, no PTX, F/U daily cxr  - Repeat abg this am and start to wean fio2 if able  - self prone if needed    GI:   - NPO currently as not able to place KO feed as pt too unstable to remove bipap  - c/w bowel regimen when KO feed tube is placed  - GI PPX    Renal:   - s/p lasix 5/11  - trend electrolytes    Heme:   - Lovenox qd   - H+H stable    ID:   - On empiric Zosyn day 4/5  - BCx x 2 sent on 5/11--neg.  - Completed decadron (5/4-5/14 )   - Completed remdesivir 5/3-5/7     Endo:   -NPH on hold since pt is NPO  -204

## 2021-05-15 NOTE — PROGRESS NOTE ADULT - ATTENDING COMMENTS
60 year old man with history of DM, HTN and hyperlipidemia admitted 5/3 with covid infection. Intubated 5/11 for hypoxic respiratory failure and ARDS secondary to viral pneumonia from COVID infection. Initially required prone ventilation. Now extubated on 5/13, to BIPAP. Still with high oxygen requirements on bipap.       - Awake and alert able to communicate  - continue to wean down FiO2. Will attempt to change to high flow if tolerated and initiate diet or insert KO feeding tube.   - adequate urine output, creatinine stable  - WBC continues to improve, cultures NGTD  - off vasopressors    - hgb stable on DVT prophylaxis

## 2021-05-16 LAB
ALBUMIN SERPL ELPH-MCNC: 3.2 G/DL — LOW (ref 3.3–5)
ALP SERPL-CCNC: 125 U/L — HIGH (ref 40–120)
ALT FLD-CCNC: 47 U/L — HIGH (ref 10–45)
ANION GAP SERPL CALC-SCNC: 17 MMOL/L — SIGNIFICANT CHANGE UP (ref 5–17)
APTT BLD: 30.9 SEC — SIGNIFICANT CHANGE UP (ref 27.5–35.5)
AST SERPL-CCNC: 37 U/L — SIGNIFICANT CHANGE UP (ref 10–40)
BILIRUB SERPL-MCNC: 1.3 MG/DL — HIGH (ref 0.2–1.2)
BUN SERPL-MCNC: 37 MG/DL — HIGH (ref 7–23)
CALCIUM SERPL-MCNC: 9.6 MG/DL — SIGNIFICANT CHANGE UP (ref 8.4–10.5)
CHLORIDE SERPL-SCNC: 98 MMOL/L — SIGNIFICANT CHANGE UP (ref 96–108)
CO2 SERPL-SCNC: 23 MMOL/L — SIGNIFICANT CHANGE UP (ref 22–31)
CREAT SERPL-MCNC: 0.71 MG/DL — SIGNIFICANT CHANGE UP (ref 0.5–1.3)
CULTURE RESULTS: SIGNIFICANT CHANGE UP
CULTURE RESULTS: SIGNIFICANT CHANGE UP
D DIMER BLD IA.RAPID-MCNC: 1258 NG/ML DDU — HIGH
GAS PNL BLDA: SIGNIFICANT CHANGE UP
GLUCOSE BLDC GLUCOMTR-MCNC: 150 MG/DL — HIGH (ref 70–99)
GLUCOSE BLDC GLUCOMTR-MCNC: 154 MG/DL — HIGH (ref 70–99)
GLUCOSE BLDC GLUCOMTR-MCNC: 169 MG/DL — HIGH (ref 70–99)
GLUCOSE BLDC GLUCOMTR-MCNC: 172 MG/DL — HIGH (ref 70–99)
GLUCOSE SERPL-MCNC: 190 MG/DL — HIGH (ref 70–99)
HCT VFR BLD CALC: 48.9 % — SIGNIFICANT CHANGE UP (ref 39–50)
HGB BLD-MCNC: 15.9 G/DL — SIGNIFICANT CHANGE UP (ref 13–17)
INR BLD: 1.11 RATIO — SIGNIFICANT CHANGE UP (ref 0.88–1.16)
MAGNESIUM SERPL-MCNC: 2.4 MG/DL — SIGNIFICANT CHANGE UP (ref 1.6–2.6)
MCHC RBC-ENTMCNC: 28.3 PG — SIGNIFICANT CHANGE UP (ref 27–34)
MCHC RBC-ENTMCNC: 32.5 GM/DL — SIGNIFICANT CHANGE UP (ref 32–36)
MCV RBC AUTO: 87.2 FL — SIGNIFICANT CHANGE UP (ref 80–100)
NRBC # BLD: 0 /100 WBCS — SIGNIFICANT CHANGE UP (ref 0–0)
NT-PROBNP SERPL-SCNC: 280 PG/ML — SIGNIFICANT CHANGE UP (ref 0–300)
PHOSPHATE SERPL-MCNC: 3.7 MG/DL — SIGNIFICANT CHANGE UP (ref 2.5–4.5)
PLATELET # BLD AUTO: 327 K/UL — SIGNIFICANT CHANGE UP (ref 150–400)
POTASSIUM SERPL-MCNC: 4.5 MMOL/L — SIGNIFICANT CHANGE UP (ref 3.5–5.3)
POTASSIUM SERPL-SCNC: 4.5 MMOL/L — SIGNIFICANT CHANGE UP (ref 3.5–5.3)
PROT SERPL-MCNC: 7.6 G/DL — SIGNIFICANT CHANGE UP (ref 6–8.3)
PROTHROM AB SERPL-ACNC: 13.3 SEC — SIGNIFICANT CHANGE UP (ref 10.6–13.6)
RBC # BLD: 5.61 M/UL — SIGNIFICANT CHANGE UP (ref 4.2–5.8)
RBC # FLD: 12 % — SIGNIFICANT CHANGE UP (ref 10.3–14.5)
SODIUM SERPL-SCNC: 138 MMOL/L — SIGNIFICANT CHANGE UP (ref 135–145)
SPECIMEN SOURCE: SIGNIFICANT CHANGE UP
SPECIMEN SOURCE: SIGNIFICANT CHANGE UP
TROPONIN T, HIGH SENSITIVITY RESULT: 6 NG/L — SIGNIFICANT CHANGE UP (ref 0–51)
WBC # BLD: 18.01 K/UL — HIGH (ref 3.8–10.5)
WBC # FLD AUTO: 18.01 K/UL — HIGH (ref 3.8–10.5)

## 2021-05-16 PROCEDURE — 71045 X-RAY EXAM CHEST 1 VIEW: CPT | Mod: 26

## 2021-05-16 PROCEDURE — 99291 CRITICAL CARE FIRST HOUR: CPT | Mod: 25

## 2021-05-16 RX ORDER — ACETAMINOPHEN 500 MG
1000 TABLET ORAL ONCE
Refills: 0 | Status: COMPLETED | OUTPATIENT
Start: 2021-05-16 | End: 2021-05-16

## 2021-05-16 RX ORDER — ETOMIDATE 2 MG/ML
29 INJECTION INTRAVENOUS ONCE
Refills: 0 | Status: DISCONTINUED | OUTPATIENT
Start: 2021-05-16 | End: 2021-05-16

## 2021-05-16 RX ORDER — KETOROLAC TROMETHAMINE 30 MG/ML
15 SYRINGE (ML) INJECTION EVERY 12 HOURS
Refills: 0 | Status: DISCONTINUED | OUTPATIENT
Start: 2021-05-16 | End: 2021-05-16

## 2021-05-16 RX ORDER — FENTANYL CITRATE 50 UG/ML
100 INJECTION INTRAVENOUS ONCE
Refills: 0 | Status: DISCONTINUED | OUTPATIENT
Start: 2021-05-16 | End: 2021-05-16

## 2021-05-16 RX ORDER — ACETAMINOPHEN 500 MG
1000 TABLET ORAL ONCE
Refills: 0 | Status: COMPLETED | OUTPATIENT
Start: 2021-05-16 | End: 2021-05-17

## 2021-05-16 RX ORDER — KETOROLAC TROMETHAMINE 30 MG/ML
15 SYRINGE (ML) INJECTION ONCE
Refills: 0 | Status: DISCONTINUED | OUTPATIENT
Start: 2021-05-16 | End: 2021-05-16

## 2021-05-16 RX ORDER — KETOROLAC TROMETHAMINE 30 MG/ML
15 SYRINGE (ML) INJECTION
Refills: 0 | Status: DISCONTINUED | OUTPATIENT
Start: 2021-05-16 | End: 2021-05-16

## 2021-05-16 RX ORDER — HYDROMORPHONE HYDROCHLORIDE 2 MG/ML
0.5 INJECTION INTRAMUSCULAR; INTRAVENOUS; SUBCUTANEOUS ONCE
Refills: 0 | Status: DISCONTINUED | OUTPATIENT
Start: 2021-05-16 | End: 2021-05-16

## 2021-05-16 RX ADMIN — Medication 15 MILLIGRAM(S): at 17:29

## 2021-05-16 RX ADMIN — Medication 2: at 00:34

## 2021-05-16 RX ADMIN — Medication 2: at 05:15

## 2021-05-16 RX ADMIN — Medication 400 MILLIGRAM(S): at 15:07

## 2021-05-16 RX ADMIN — HYDROMORPHONE HYDROCHLORIDE 0.5 MILLIGRAM(S): 2 INJECTION INTRAMUSCULAR; INTRAVENOUS; SUBCUTANEOUS at 18:52

## 2021-05-16 RX ADMIN — Medication 400 MILLIGRAM(S): at 01:22

## 2021-05-16 RX ADMIN — POLYETHYLENE GLYCOL 3350 17 GRAM(S): 17 POWDER, FOR SOLUTION ORAL at 05:16

## 2021-05-16 RX ADMIN — ATORVASTATIN CALCIUM 20 MILLIGRAM(S): 80 TABLET, FILM COATED ORAL at 22:09

## 2021-05-16 RX ADMIN — CHLORHEXIDINE GLUCONATE 1 APPLICATION(S): 213 SOLUTION TOPICAL at 06:17

## 2021-05-16 RX ADMIN — Medication 15 MILLIGRAM(S): at 11:24

## 2021-05-16 RX ADMIN — Medication 15 MILLIGRAM(S): at 05:16

## 2021-05-16 RX ADMIN — Medication 400 MILLIGRAM(S): at 23:03

## 2021-05-16 RX ADMIN — ENOXAPARIN SODIUM 40 MILLIGRAM(S): 100 INJECTION SUBCUTANEOUS at 11:25

## 2021-05-16 RX ADMIN — PIPERACILLIN AND TAZOBACTAM 25 GRAM(S): 4; .5 INJECTION, POWDER, LYOPHILIZED, FOR SOLUTION INTRAVENOUS at 01:22

## 2021-05-16 RX ADMIN — Medication 15 MILLIGRAM(S): at 22:09

## 2021-05-16 RX ADMIN — Medication 1000 UNIT(S): at 11:25

## 2021-05-16 RX ADMIN — Medication 15 MILLIGRAM(S): at 17:59

## 2021-05-16 RX ADMIN — Medication 15 MILLIGRAM(S): at 05:45

## 2021-05-16 RX ADMIN — SENNA PLUS 10 MILLILITER(S): 8.6 TABLET ORAL at 22:09

## 2021-05-16 RX ADMIN — PIPERACILLIN AND TAZOBACTAM 25 GRAM(S): 4; .5 INJECTION, POWDER, LYOPHILIZED, FOR SOLUTION INTRAVENOUS at 09:35

## 2021-05-16 RX ADMIN — PANTOPRAZOLE SODIUM 40 MILLIGRAM(S): 20 TABLET, DELAYED RELEASE ORAL at 11:24

## 2021-05-16 RX ADMIN — Medication 1000 MILLIGRAM(S): at 15:37

## 2021-05-16 RX ADMIN — Medication 15 MILLIGRAM(S): at 11:54

## 2021-05-16 RX ADMIN — PIPERACILLIN AND TAZOBACTAM 25 GRAM(S): 4; .5 INJECTION, POWDER, LYOPHILIZED, FOR SOLUTION INTRAVENOUS at 17:30

## 2021-05-16 RX ADMIN — Medication 1000 MILLIGRAM(S): at 01:50

## 2021-05-16 RX ADMIN — Medication 1000 MILLIGRAM(S): at 08:56

## 2021-05-16 RX ADMIN — HYDROMORPHONE HYDROCHLORIDE 0.5 MILLIGRAM(S): 2 INJECTION INTRAMUSCULAR; INTRAVENOUS; SUBCUTANEOUS at 18:22

## 2021-05-16 RX ADMIN — Medication 2: at 17:29

## 2021-05-16 RX ADMIN — Medication 400 MILLIGRAM(S): at 08:26

## 2021-05-16 NOTE — PROGRESS NOTE ADULT - ASSESSMENT
59 yo M with medical history of HTN, DM and hyperlipidemia who was admitted initially for shortness of breath for few days and hypoxemia of 88% on RA, patient was found to have COVID-19 associated pneumonia, he was started on supplemental O2 along with Decadron and Remdisivir. Patient's condition gradually deteriorated to the point he required HFNC then BPAP, today pt was intubated as his SpO2 went down to low 80s% despite all non-invasive measures. In the ICU, patient was placed on V-AC mode with settings of 32/450/14/100, PEEP was up titrated gradually. He remained asynchronous with the ventilator despite adequate sedation, for which Nimbex drip was initiated. Also, his BP went down after high dose propofol, RIJ TLC with A line were inserted.     5/3 Admitted to [facility]  5/11 Intubated transferred to 5 ICU and proned  5/12 supinated  5/13: New Pneumomediastinum  5/13: extubated to BiPAP 12/6/70%    Neuro:   Weaned off sedation   Following commands to all extremities    CV:   - Hx of HTN now off pressors and able to maintain maps >65      Resp:   AC: 450/26/10/45-->cpap 12/10---> 5/13extubated to BIPAP 12/6 70%  - s/p proned on 5/11 and did well after 1 proning no longer requires it.  - Now with new Pneumomediastinal subq air, no PTX, F/U daily cxr  -Wean Fi02 if able  - self prone if needed    GI:   -  KO feed tube in place start TF today  - c/w bowel regimen when KO feed tube is placed  - GI PPX    Renal:   - s/p lasix 5/15  - trend electrolytes    Heme:   - Lovenox qd   - H+H stable    ID:   - On empiric Zosyn day 5/7  - BCx x 2 sent on 5/11--neg.  - Completed decadron (5/4-5/14 )   - Completed remdesivir 5/3-5/7     Endo:   -NPH on hold since pt is NPO  -[id number] 61 yo M with medical history of HTN, DM and hyperlipidemia who was admitted initially for shortness of breath for few days and hypoxemia of 88% on RA, patient was found to have COVID-19 associated pneumonia, he was started on supplemental O2 along with Decadron and Remdisivir. Patient's condition gradually deteriorated to the point he required HFNC then BPAP, today pt was intubated as his SpO2 went down to low 80s% despite all non-invasive measures. In the ICU, patient was placed on V-AC mode with settings of 32/450/14/100, PEEP was up titrated gradually. He remained asynchronous with the ventilator despite adequate sedation, for which Nimbex drip was initiated. Also, his BP went down after high dose propofol, RIJ TLC with A line were inserted. Patient remained intubated for 2 days then was extubated to bipap on 5/13 where he has toggled between bipap and high flow.    5/3 Admitted to [facility]  5/11 Intubated transferred to 5 ICU and proned  5/12 supinated  5/13: New Pneumomediastinum  5/13: extubated to BiPAP 12/6/70%    Neuro:   Weaned off sedation   Following commands to all extremities    CV:   - Hx of HTN now off pressors and able to maintain maps >65      Resp:   AC: 450/26/10/45-->cpap 12/10---> 5/13extubated to BIPAP 12/6 70%  - s/p proned on 5/11 and did well after 1 proning no longer requires it.  - Now with new Pneumomediastinal subq air, no PTX, F/U daily cxr  -Wean Fi02 if able  - self prone if needed    GI:   -  KO feed tube in place start TF today  - c/w bowel regimen when KO feed tube is placed  - GI PPX    Renal:   - s/p lasix 5/15  - trend electrolytes    Heme:   - Lovenox qd   - H+H stable    ID:   - On empiric Zosyn day 5/7  - BCx x 2 sent on 5/11--neg.  - Completed decadron (5/4-5/14 )   - Completed remdesivir 5/3-5/7     Endo:   -NPH on hold since pt is NPO  -[id number] 61 yo M with medical history of HTN, DM and hyperlipidemia who was admitted initially for shortness of breath for few days and hypoxemia of 88% on RA, patient was found to have COVID-19 associated pneumonia, he was started on supplemental O2 along with Decadron and Remdisivir. Patient's condition gradually deteriorated to the point he required HFNC then BPAP, today pt was intubated as his SpO2 went down to low 80s% despite all non-invasive measures. In the ICU, patient was placed on V-AC mode with settings of 32/450/14/100, PEEP was up titrated gradually. He remained asynchronous with the ventilator despite adequate sedation, for which Nimbex drip was initiated. Also, his BP went down after high dose propofol, RIJ TLC with A line were inserted. Patient remained intubated for 2 days then was extubated to bipap on 5/13 where he has toggled between bipap and high flow.    5/3 Admitted to [facility]  5/11 Intubated transferred to 5 ICU and proned  5/12 supinated  5/13: New Pneumomediastinum  5/13: extubated to BiPAP 12/6/70%    Neuro:   - Off sedation.   - Following commands to all extremities  - Pending PT eval.     CV:   - Off vasopressors.   - Episodes of chest pain (pluertic?). EKG without changes. Troponin 7 --> 7. Resolved with Tylenol/toradol    Resp:   AHRF  - Extubated (5/13)  - Currently on HF 60L 90% sats 89-91  - Nocturnal BiPAP 12/10/100%  - Pnumomediastinum (5/13) resolving, follow daily chest x-rays.     GI:   - No oral access.   - Place KO if off BiPAP.     Renal:   - Keep net even.   - Boss d/c'ed. One episode of retention. Bladder scan q8. Straight cath PRN.     Heme:   - Lovenox 40 q day.     ID:   Empiric/Fevers  - Zosyn (5/12 - 19).   - BCx 5/12 NGTD.   COVID  - S/p Dex and remdesivir 5/3-5/7     Endo:   - NPH 5 q6  with [facility]  - -210

## 2021-05-16 NOTE — PROGRESS NOTE ADULT - SUBJECTIVE AND OBJECTIVE BOX
CHIEF COMPLAINT:  Patient is a 60y old  Male who presents with a chief complaint of shortness of breath (15 May 2021 07:37)    HPI:  61 yo M with PMH of HTN, HLD, T2DM, presents here with SOB and hypoxia.  Patient started feeling fatigue and malaise on Monday.  Over the last three days, he also started having decreased appetite, mild cough, SOB, feverish, chills.  He has been mostly in bed, not able to do his usual ADLs.  This morning he had an episode of vomiting.  He was also having some back pain, which is worse with walking.  He had no diarrhea.  Daughter-in-law checked his pulse ox at home which was 88%, which prompted her to bring him to hospital.  While in ED, patient felt lethargic and had a brief 20 sec period of unresponsiveness.  Patient denies losing consciousness; states he remembers hearing his daughter-in-law calling his name, but he did not respond because he was not feeling well.  In ED, his vitals showed temp 98.5, HR 89, /70, RR 19-26, saturation 88% on RA.   (03 May 2021 03:11)      Interval Events:      REVIEW OF SYSTEMS:          OBJECTIVE:  ICU Vital Signs Last 24 Hrs  T(C): 36.7 (16 May 2021 05:00), Max: 36.8 (16 May 2021 00:00)  T(F): 98 (16 May 2021 05:00), Max: 98.2 (16 May 2021 00:00)  HR: 81 (16 May 2021 08:00) (80 - 102)  BP: --  BP(mean): --  ABP: 137/67 (16 May 2021 08:00) (98/53 - 157/68)  ABP(mean): 91 (16 May 2021 08:00) (69 - 96)  RR: 20 (16 May 2021 08:00) (16 - 39)  SpO2: 89% (16 May 2021 08:00) (84% - 96%)        05-15 @ 07:01  -  05-16 @ 07:00  --------------------------------------------------------  IN: 720 mL / OUT: 1500 mL / NET: -780 mL      CAPILLARY BLOOD GLUCOSE      POCT Blood Glucose.: 172 mg/dL (16 May 2021 05:14)          PHYSICAL EXAM:          HOSPITAL MEDICATIONS:  MEDICATIONS  (STANDING):  acetaminophen  IVPB .. 1000 milliGRAM(s) IV Intermittent once  acetaminophen  IVPB .. 1000 milliGRAM(s) IV Intermittent once  acetaminophen  IVPB .. 1000 milliGRAM(s) IV Intermittent once  atorvastatin 20 milliGRAM(s) Oral at bedtime  chlorhexidine 4% Liquid 1 Application(s) Topical <User Schedule>  cholecalciferol 1000 Unit(s) Oral daily  enoxaparin Injectable 40 milliGRAM(s) SubCutaneous daily  insulin lispro (ADMELOG) corrective regimen sliding scale   SubCutaneous every 6 hours  ketorolac   Injectable 15 milliGRAM(s) IV Push <User Schedule>  pantoprazole  Injectable 40 milliGRAM(s) IV Push daily  piperacillin/tazobactam IVPB.. 3.375 Gram(s) IV Intermittent every 8 hours  polyethylene glycol 3350 17 Gram(s) Oral two times a day  senna Syrup 10 milliLiter(s) Oral at bedtime    MEDICATIONS  (PRN):  acetaminophen   Tablet .. 650 milliGRAM(s) Oral every 6 hours PRN Temp greater or equal to 38C (100.4F), Mild Pain (1 - 3)  ALBUTerol    90 MICROgram(s) HFA Inhaler 1 Puff(s) Inhalation every 4 hours PRN Shortness of Breath      LABS:                        15.9   18.01 )-----------( 327      ( 16 May 2021 00:46 )             48.9     Hgb Trend: 15.9<--, 14.3<--, 13.5<--, 14.0<--, 14.7<--  05-16    138  |  98  |  37<H>  ----------------------------<  190<H>  4.5   |  23  |  0.71    Ca    9.6      16 May 2021 00:46  Phos  3.7     05-16  Mg     2.4     05-16    TPro  7.6  /  Alb  3.2<L>  /  TBili  1.3<H>  /  DBili  x   /  AST  37  /  ALT  47<H>  /  AlkPhos  125<H>  05-16    LIVER FUNCTIONS - ( 16 May 2021 00:46 )  Alb: 3.2 g/dL / Pro: 7.6 g/dL / ALK PHOS: 125 U/L / ALT: 47 U/L / AST: 37 U/L / GGT: x           Creatinine Trend: 0.71<--, 0.78<--, 0.65<--, 0.72<--, 0.75<--, 0.71<--  PT/INR - ( 16 May 2021 00:58 )   PT: 13.3 sec;   INR: 1.11 ratio         PTT - ( 16 May 2021 00:58 )  PTT:30.9 sec    Arterial Blood Gas:  05-16 @ 02:37  7.46/45/72/31/94/6.9  ABG lactate: --  Arterial Blood Gas:  05-16 @ 01:01  7.47/42/55/30/88/6.1  ABG lactate: --  Arterial Blood Gas:  05-16 @ 00:32  7.48/41/42/30/77/6.6  ABG lactate: --  Arterial Blood Gas:  05-15 @ 17:49  7.47/40/73/29/94/5.1  ABG lactate: --  Arterial Blood Gas:  05-15 @ 12:44  7.49/42/72/31/94/7.3  ABG lactate: --  Arterial Blood Gas:  05-15 @ 00:04  7.47/42/71/30/93/6.4  ABG lactate: --  Arterial Blood Gas:  05-14 @ 08:31  7.46/41/89/29/96/4.9  ABG lactate: --        MICROBIOLOGY:     RADIOLOGY:  [ ] Reviewed and interpreted by me    EKG:       CHIEF COMPLAINT:  Patient is a 60y old  Male who presents with a chief complaint of shortness of breath (15 May 2021 07:37)    HPI:  61 yo M with PMH of HTN, HLD, T2DM, presents here with SOB and hypoxia.  Patient started feeling fatigue and malaise on Monday.  Over the last three days, he also started having decreased appetite, mild cough, SOB, feverish, chills.  He has been mostly in bed, not able to do his usual ADLs.  This morning he had an episode of vomiting.  He was also having some back pain, which is worse with walking.  He had no diarrhea.  Daughter-in-law checked his pulse ox at home which was 88%, which prompted her to bring him to hospital.  While in ED, patient felt lethargic and had a brief 20 sec period of unresponsiveness.  Patient denies losing consciousness; states he remembers hearing his daughter-in-law calling his name, but he did not respond because he was not feeling well.  In ED, his vitals showed temp 98.5, HR 89, /70, RR 19-26, saturation 88% on RA.   (03 May 2021 03:11)      Interval Events: desat to mid 70s overnight placed on 100% fio2 and bipap, c/o chest pleuritic pain tylenol and toradol works well      REVIEW OF SYSTEMS: as above otherwise unremarkable          OBJECTIVE:  ICU Vital Signs Last 24 Hrs  T(C): 36.7 (16 May 2021 05:00), Max: 36.8 (16 May 2021 00:00)  T(F): 98 (16 May 2021 05:00), Max: 98.2 (16 May 2021 00:00)  HR: 81 (16 May 2021 08:00) (80 - 102)  BP: --  BP(mean): --  ABP: 137/67 (16 May 2021 08:00) (98/53 - 157/68)  ABP(mean): 91 (16 May 2021 08:00) (69 - 96)  RR: 20 (16 May 2021 08:00) (16 - 39)  SpO2: 89% (16 May 2021 08:00) (84% - 96%)        05-15 @ 07:01  -  05-16 @ 07:00  --------------------------------------------------------  IN: 720 mL / OUT: 1500 mL / NET: -780 mL      CAPILLARY BLOOD GLUCOSE      POCT Blood Glucose.: 172 mg/dL (16 May 2021 05:14)          PHYSICAL EXAM:  GENERAL: NAD,  HEENT:  Atraumatic, Normocephalic  EYES: PERRLA, conjunctiva and sclera clear  NECK: Supple, No JVD  CHEST/LUNG: diminished bilaterally; No wheezes, rales, or rhonchi  HEART: Regular rate and rhythm; No murmurs, rubs, or gallops  ABDOMEN: Soft, Nontender, Nondistended; Bowel sounds present  EXTREMITIES:  2+ Peripheral Pulses, No clubbing, cyanosis, or edema  PSYCH: unable as pt is sedated  NEUROLOGY:A+O x 3 POLLACK  SKIN: No rashes or lesions        HOSPITAL MEDICATIONS:  MEDICATIONS  (STANDING):  acetaminophen  IVPB .. 1000 milliGRAM(s) IV Intermittent once  acetaminophen  IVPB .. 1000 milliGRAM(s) IV Intermittent once  acetaminophen  IVPB .. 1000 milliGRAM(s) IV Intermittent once  atorvastatin 20 milliGRAM(s) Oral at bedtime  chlorhexidine 4% Liquid 1 Application(s) Topical <User Schedule>  cholecalciferol 1000 Unit(s) Oral daily  enoxaparin Injectable 40 milliGRAM(s) SubCutaneous daily  insulin lispro (ADMELOG) corrective regimen sliding scale   SubCutaneous every 6 hours  ketorolac   Injectable 15 milliGRAM(s) IV Push <User Schedule>  pantoprazole  Injectable 40 milliGRAM(s) IV Push daily  piperacillin/tazobactam IVPB.. 3.375 Gram(s) IV Intermittent every 8 hours  polyethylene glycol 3350 17 Gram(s) Oral two times a day  senna Syrup 10 milliLiter(s) Oral at bedtime    MEDICATIONS  (PRN):  acetaminophen   Tablet .. 650 milliGRAM(s) Oral every 6 hours PRN Temp greater or equal to 38C (100.4F), Mild Pain (1 - 3)  ALBUTerol    90 MICROgram(s) HFA Inhaler 1 Puff(s) Inhalation every 4 hours PRN Shortness of Breath      LABS:                        15.9   18.01 )-----------( 327      ( 16 May 2021 00:46 )             48.9     Hgb Trend: 15.9<--, 14.3<--, 13.5<--, 14.0<--, 14.7<--  05-16    138  |  98  |  37<H>  ----------------------------<  190<H>  4.5   |  23  |  0.71    Ca    9.6      16 May 2021 00:46  Phos  3.7     05-16  Mg     2.4     05-16    TPro  7.6  /  Alb  3.2<L>  /  TBili  1.3<H>  /  DBili  x   /  AST  37  /  ALT  47<H>  /  AlkPhos  125<H>  05-16    LIVER FUNCTIONS - ( 16 May 2021 00:46 )  Alb: 3.2 g/dL / Pro: 7.6 g/dL / ALK PHOS: 125 U/L / ALT: 47 U/L / AST: 37 U/L / GGT: x           Creatinine Trend: 0.71<--, 0.78<--, 0.65<--, 0.72<--, 0.75<--, 0.71<--  PT/INR - ( 16 May 2021 00:58 )   PT: 13.3 sec;   INR: 1.11 ratio         PTT - ( 16 May 2021 00:58 )  PTT:30.9 sec    Arterial Blood Gas:  05-16 @ 02:37  7.46/45/72/31/94/6.9  ABG lactate: --  Arterial Blood Gas:  05-16 @ 01:01  7.47/42/55/30/88/6.1  ABG lactate: --  Arterial Blood Gas:  05-16 @ 00:32  7.48/41/42/30/77/6.6  ABG lactate: --  Arterial Blood Gas:  05-15 @ 17:49  7.47/40/73/29/94/5.1  ABG lactate: --  Arterial Blood Gas:  05-15 @ 12:44  7.49/42/72/31/94/7.3  ABG lactate: --  Arterial Blood Gas:  05-15 @ 00:04  7.47/42/71/30/93/6.4  ABG lactate: --  Arterial Blood Gas:  05-14 @ 08:31  7.46/41/89/29/96/4.9  ABG lactate: --        MICROBIOLOGY:     RADIOLOGY:  [ ] Reviewed and interpreted by me    EKG:

## 2021-05-16 NOTE — PROGRESS NOTE ADULT - ATTENDING COMMENTS
60 year old man with history of DM, HTN and hyperlipidemia admitted 5/3 with covid infection. Intubated 5/11 for hypoxic respiratory failure and ARDS secondary to viral pneumonia from COVID infection. Initially required prone ventilation. Now extubated on 5/13, to BIPAP. Still with high oxygen requirements on bipap/high flow. Overnight event of desaturation 2/2 splinting and anxiety. Responded after being started on bipap.       - Awake and alert able to communicate  - Continue weaning high flow.  - Toradol for pain control. Encourage incentive spirometry. Off steroids.   - adequate urine output, creatinine stable  - WBC continues to improve, cultures NGTD  - off vasopressors    - hgb stable on DVT prophylaxis .

## 2021-05-17 LAB
ALBUMIN SERPL ELPH-MCNC: 2.7 G/DL — LOW (ref 3.3–5)
ALP SERPL-CCNC: 109 U/L — SIGNIFICANT CHANGE UP (ref 40–120)
ALT FLD-CCNC: 71 U/L — HIGH (ref 10–45)
ANION GAP SERPL CALC-SCNC: 12 MMOL/L — SIGNIFICANT CHANGE UP (ref 5–17)
APPEARANCE UR: ABNORMAL
AST SERPL-CCNC: 62 U/L — HIGH (ref 10–40)
BACTERIA # UR AUTO: NEGATIVE — SIGNIFICANT CHANGE UP
BILIRUB SERPL-MCNC: 1 MG/DL — SIGNIFICANT CHANGE UP (ref 0.2–1.2)
BILIRUB UR-MCNC: NEGATIVE — SIGNIFICANT CHANGE UP
BUN SERPL-MCNC: 56 MG/DL — HIGH (ref 7–23)
CALCIUM SERPL-MCNC: 8.1 MG/DL — LOW (ref 8.4–10.5)
CHLORIDE SERPL-SCNC: 100 MMOL/L — SIGNIFICANT CHANGE UP (ref 96–108)
CO2 SERPL-SCNC: 26 MMOL/L — SIGNIFICANT CHANGE UP (ref 22–31)
COLOR SPEC: YELLOW — SIGNIFICANT CHANGE UP
CREAT SERPL-MCNC: 0.8 MG/DL — SIGNIFICANT CHANGE UP (ref 0.5–1.3)
DIFF PNL FLD: ABNORMAL
EPI CELLS # UR: 3 /HPF — SIGNIFICANT CHANGE UP
GAS PNL BLDA: SIGNIFICANT CHANGE UP
GLUCOSE BLDC GLUCOMTR-MCNC: 147 MG/DL — HIGH (ref 70–99)
GLUCOSE BLDC GLUCOMTR-MCNC: 149 MG/DL — HIGH (ref 70–99)
GLUCOSE BLDC GLUCOMTR-MCNC: 168 MG/DL — HIGH (ref 70–99)
GLUCOSE BLDC GLUCOMTR-MCNC: 198 MG/DL — HIGH (ref 70–99)
GLUCOSE SERPL-MCNC: 175 MG/DL — HIGH (ref 70–99)
GLUCOSE UR QL: NEGATIVE — SIGNIFICANT CHANGE UP
HCT VFR BLD CALC: 43.2 % — SIGNIFICANT CHANGE UP (ref 39–50)
HGB BLD-MCNC: 13.8 G/DL — SIGNIFICANT CHANGE UP (ref 13–17)
HYALINE CASTS # UR AUTO: 6 /LPF — HIGH (ref 0–2)
KETONES UR-MCNC: NEGATIVE — SIGNIFICANT CHANGE UP
LEUKOCYTE ESTERASE UR-ACNC: NEGATIVE — SIGNIFICANT CHANGE UP
MAGNESIUM SERPL-MCNC: 2.4 MG/DL — SIGNIFICANT CHANGE UP (ref 1.6–2.6)
MCHC RBC-ENTMCNC: 28 PG — SIGNIFICANT CHANGE UP (ref 27–34)
MCHC RBC-ENTMCNC: 31.9 GM/DL — LOW (ref 32–36)
MCV RBC AUTO: 87.8 FL — SIGNIFICANT CHANGE UP (ref 80–100)
NITRITE UR-MCNC: NEGATIVE — SIGNIFICANT CHANGE UP
NRBC # BLD: 0 /100 WBCS — SIGNIFICANT CHANGE UP (ref 0–0)
PH UR: 6 — SIGNIFICANT CHANGE UP (ref 5–8)
PHOSPHATE SERPL-MCNC: 3.7 MG/DL — SIGNIFICANT CHANGE UP (ref 2.5–4.5)
PLATELET # BLD AUTO: 253 K/UL — SIGNIFICANT CHANGE UP (ref 150–400)
POTASSIUM SERPL-MCNC: 3.3 MMOL/L — LOW (ref 3.5–5.3)
POTASSIUM SERPL-SCNC: 3.3 MMOL/L — LOW (ref 3.5–5.3)
PROT SERPL-MCNC: 6 G/DL — SIGNIFICANT CHANGE UP (ref 6–8.3)
PROT UR-MCNC: ABNORMAL
RBC # BLD: 4.92 M/UL — SIGNIFICANT CHANGE UP (ref 4.2–5.8)
RBC # FLD: 12 % — SIGNIFICANT CHANGE UP (ref 10.3–14.5)
RBC CASTS # UR COMP ASSIST: 80 /HPF — HIGH (ref 0–4)
SODIUM SERPL-SCNC: 138 MMOL/L — SIGNIFICANT CHANGE UP (ref 135–145)
SP GR SPEC: 1.04 — HIGH (ref 1.01–1.02)
UROBILINOGEN FLD QL: NEGATIVE — SIGNIFICANT CHANGE UP
WBC # BLD: 14.05 K/UL — HIGH (ref 3.8–10.5)
WBC # FLD AUTO: 14.05 K/UL — HIGH (ref 3.8–10.5)
WBC UR QL: 7 /HPF — HIGH (ref 0–5)

## 2021-05-17 PROCEDURE — 31500 INSERT EMERGENCY AIRWAY: CPT

## 2021-05-17 PROCEDURE — 99291 CRITICAL CARE FIRST HOUR: CPT | Mod: 25

## 2021-05-17 PROCEDURE — 93308 TTE F-UP OR LMTD: CPT | Mod: 26

## 2021-05-17 PROCEDURE — 71045 X-RAY EXAM CHEST 1 VIEW: CPT | Mod: 26

## 2021-05-17 PROCEDURE — 76604 US EXAM CHEST: CPT | Mod: 26

## 2021-05-17 PROCEDURE — 93010 ELECTROCARDIOGRAM REPORT: CPT

## 2021-05-17 PROCEDURE — 99292 CRITICAL CARE ADDL 30 MIN: CPT | Mod: 25

## 2021-05-17 RX ORDER — PROPOFOL 10 MG/ML
10 INJECTION, EMULSION INTRAVENOUS ONCE
Refills: 0 | Status: COMPLETED | OUTPATIENT
Start: 2021-05-17 | End: 2021-05-17

## 2021-05-17 RX ORDER — KETOROLAC TROMETHAMINE 30 MG/ML
15 SYRINGE (ML) INJECTION ONCE
Refills: 0 | Status: DISCONTINUED | OUTPATIENT
Start: 2021-05-17 | End: 2021-05-17

## 2021-05-17 RX ORDER — NOREPINEPHRINE BITARTRATE/D5W 8 MG/250ML
0.05 PLASTIC BAG, INJECTION (ML) INTRAVENOUS
Qty: 8 | Refills: 0 | Status: DISCONTINUED | OUTPATIENT
Start: 2021-05-17 | End: 2021-05-20

## 2021-05-17 RX ORDER — MIDAZOLAM HYDROCHLORIDE 1 MG/ML
6 INJECTION, SOLUTION INTRAMUSCULAR; INTRAVENOUS ONCE
Refills: 0 | Status: DISCONTINUED | OUTPATIENT
Start: 2021-05-17 | End: 2021-05-17

## 2021-05-17 RX ORDER — ROCURONIUM BROMIDE 10 MG/ML
60 VIAL (ML) INTRAVENOUS ONCE
Refills: 0 | Status: DISCONTINUED | OUTPATIENT
Start: 2021-05-17 | End: 2021-05-17

## 2021-05-17 RX ORDER — MIDAZOLAM HYDROCHLORIDE 1 MG/ML
4 INJECTION, SOLUTION INTRAMUSCULAR; INTRAVENOUS ONCE
Refills: 0 | Status: DISCONTINUED | OUTPATIENT
Start: 2021-05-17 | End: 2021-05-17

## 2021-05-17 RX ORDER — FENTANYL CITRATE 50 UG/ML
50 INJECTION INTRAVENOUS ONCE
Refills: 0 | Status: DISCONTINUED | OUTPATIENT
Start: 2021-05-17 | End: 2021-05-17

## 2021-05-17 RX ORDER — ACETAMINOPHEN 500 MG
1000 TABLET ORAL ONCE
Refills: 0 | Status: COMPLETED | OUTPATIENT
Start: 2021-05-17 | End: 2021-05-17

## 2021-05-17 RX ORDER — POTASSIUM CHLORIDE 20 MEQ
20 PACKET (EA) ORAL
Refills: 0 | Status: COMPLETED | OUTPATIENT
Start: 2021-05-17 | End: 2021-05-17

## 2021-05-17 RX ORDER — HYDROMORPHONE HYDROCHLORIDE 2 MG/ML
0.5 INJECTION INTRAMUSCULAR; INTRAVENOUS; SUBCUTANEOUS ONCE
Refills: 0 | Status: DISCONTINUED | OUTPATIENT
Start: 2021-05-17 | End: 2021-05-17

## 2021-05-17 RX ORDER — PROPOFOL 10 MG/ML
10 INJECTION, EMULSION INTRAVENOUS
Qty: 1000 | Refills: 0 | Status: DISCONTINUED | OUTPATIENT
Start: 2021-05-17 | End: 2021-05-20

## 2021-05-17 RX ORDER — ROCURONIUM BROMIDE 10 MG/ML
50 VIAL (ML) INTRAVENOUS ONCE
Refills: 0 | Status: COMPLETED | OUTPATIENT
Start: 2021-05-17 | End: 2021-05-17

## 2021-05-17 RX ORDER — FENTANYL CITRATE 50 UG/ML
0.5 INJECTION INTRAVENOUS
Qty: 5000 | Refills: 0 | Status: DISCONTINUED | OUTPATIENT
Start: 2021-05-17 | End: 2021-05-21

## 2021-05-17 RX ORDER — CISATRACURIUM BESYLATE 2 MG/ML
20 INJECTION INTRAVENOUS ONCE
Refills: 0 | Status: COMPLETED | OUTPATIENT
Start: 2021-05-17 | End: 2021-05-17

## 2021-05-17 RX ORDER — FENTANYL CITRATE 50 UG/ML
100 INJECTION INTRAVENOUS ONCE
Refills: 0 | Status: DISCONTINUED | OUTPATIENT
Start: 2021-05-17 | End: 2021-05-17

## 2021-05-17 RX ORDER — DEXMEDETOMIDINE HYDROCHLORIDE IN 0.9% SODIUM CHLORIDE 4 UG/ML
0.2 INJECTION INTRAVENOUS
Qty: 200 | Refills: 0 | Status: DISCONTINUED | OUTPATIENT
Start: 2021-05-17 | End: 2021-05-17

## 2021-05-17 RX ORDER — CHLORHEXIDINE GLUCONATE 213 G/1000ML
15 SOLUTION TOPICAL EVERY 12 HOURS
Refills: 0 | Status: DISCONTINUED | OUTPATIENT
Start: 2021-05-17 | End: 2021-05-21

## 2021-05-17 RX ORDER — CISATRACURIUM BESYLATE 2 MG/ML
3 INJECTION INTRAVENOUS
Qty: 200 | Refills: 0 | Status: DISCONTINUED | OUTPATIENT
Start: 2021-05-17 | End: 2021-05-21

## 2021-05-17 RX ORDER — FENTANYL CITRATE 50 UG/ML
25 INJECTION INTRAVENOUS ONCE
Refills: 0 | Status: DISCONTINUED | OUTPATIENT
Start: 2021-05-17 | End: 2021-05-17

## 2021-05-17 RX ADMIN — PROPOFOL 5.72 MICROGRAM(S)/KG/MIN: 10 INJECTION, EMULSION INTRAVENOUS at 18:19

## 2021-05-17 RX ADMIN — FENTANYL CITRATE 100 MICROGRAM(S): 50 INJECTION INTRAVENOUS at 11:58

## 2021-05-17 RX ADMIN — Medication 400 MILLIGRAM(S): at 06:53

## 2021-05-17 RX ADMIN — DEXMEDETOMIDINE HYDROCHLORIDE IN 0.9% SODIUM CHLORIDE 4.77 MICROGRAM(S)/KG/HR: 4 INJECTION INTRAVENOUS at 08:34

## 2021-05-17 RX ADMIN — Medication 650 MILLIGRAM(S): at 18:13

## 2021-05-17 RX ADMIN — Medication 650 MILLIGRAM(S): at 19:17

## 2021-05-17 RX ADMIN — DEXMEDETOMIDINE HYDROCHLORIDE IN 0.9% SODIUM CHLORIDE 4.77 MICROGRAM(S)/KG/HR: 4 INJECTION INTRAVENOUS at 11:59

## 2021-05-17 RX ADMIN — CISATRACURIUM BESYLATE 17.2 MICROGRAM(S)/KG/MIN: 2 INJECTION INTRAVENOUS at 11:59

## 2021-05-17 RX ADMIN — HYDROMORPHONE HYDROCHLORIDE 0.5 MILLIGRAM(S): 2 INJECTION INTRAMUSCULAR; INTRAVENOUS; SUBCUTANEOUS at 04:52

## 2021-05-17 RX ADMIN — Medication 50 MILLIEQUIVALENT(S): at 01:43

## 2021-05-17 RX ADMIN — MIDAZOLAM HYDROCHLORIDE 4 MILLIGRAM(S): 1 INJECTION, SOLUTION INTRAMUSCULAR; INTRAVENOUS at 12:15

## 2021-05-17 RX ADMIN — Medication 1000 MILLIGRAM(S): at 07:00

## 2021-05-17 RX ADMIN — DEXMEDETOMIDINE HYDROCHLORIDE IN 0.9% SODIUM CHLORIDE 4.77 MICROGRAM(S)/KG/HR: 4 INJECTION INTRAVENOUS at 11:50

## 2021-05-17 RX ADMIN — Medication 15 MILLIGRAM(S): at 00:00

## 2021-05-17 RX ADMIN — Medication 50 MILLIEQUIVALENT(S): at 04:47

## 2021-05-17 RX ADMIN — PIPERACILLIN AND TAZOBACTAM 25 GRAM(S): 4; .5 INJECTION, POWDER, LYOPHILIZED, FOR SOLUTION INTRAVENOUS at 17:05

## 2021-05-17 RX ADMIN — Medication 50 MILLIEQUIVALENT(S): at 06:53

## 2021-05-17 RX ADMIN — FENTANYL CITRATE 25 MICROGRAM(S): 50 INJECTION INTRAVENOUS at 09:03

## 2021-05-17 RX ADMIN — ATORVASTATIN CALCIUM 20 MILLIGRAM(S): 80 TABLET, FILM COATED ORAL at 21:34

## 2021-05-17 RX ADMIN — Medication 400 MILLIGRAM(S): at 23:44

## 2021-05-17 RX ADMIN — Medication 0: at 17:04

## 2021-05-17 RX ADMIN — FENTANYL CITRATE 50 MICROGRAM(S): 50 INJECTION INTRAVENOUS at 11:39

## 2021-05-17 RX ADMIN — Medication 50 MILLIGRAM(S): at 11:39

## 2021-05-17 RX ADMIN — CHLORHEXIDINE GLUCONATE 15 MILLILITER(S): 213 SOLUTION TOPICAL at 17:13

## 2021-05-17 RX ADMIN — MIDAZOLAM HYDROCHLORIDE 6 MILLIGRAM(S): 1 INJECTION, SOLUTION INTRAMUSCULAR; INTRAVENOUS at 11:40

## 2021-05-17 RX ADMIN — CISATRACURIUM BESYLATE 20 MILLIGRAM(S): 2 INJECTION INTRAVENOUS at 12:25

## 2021-05-17 RX ADMIN — PANTOPRAZOLE SODIUM 40 MILLIGRAM(S): 20 TABLET, DELAYED RELEASE ORAL at 11:55

## 2021-05-17 RX ADMIN — Medication 1000 UNIT(S): at 12:07

## 2021-05-17 RX ADMIN — ENOXAPARIN SODIUM 40 MILLIGRAM(S): 100 INJECTION SUBCUTANEOUS at 11:54

## 2021-05-17 RX ADMIN — Medication 15 MILLIGRAM(S): at 07:00

## 2021-05-17 RX ADMIN — POLYETHYLENE GLYCOL 3350 17 GRAM(S): 17 POWDER, FOR SOLUTION ORAL at 17:05

## 2021-05-17 RX ADMIN — Medication 2: at 23:16

## 2021-05-17 RX ADMIN — PIPERACILLIN AND TAZOBACTAM 25 GRAM(S): 4; .5 INJECTION, POWDER, LYOPHILIZED, FOR SOLUTION INTRAVENOUS at 01:42

## 2021-05-17 RX ADMIN — Medication 8.93 MICROGRAM(S)/KG/MIN: at 08:35

## 2021-05-17 RX ADMIN — FENTANYL CITRATE 25 MICROGRAM(S): 50 INJECTION INTRAVENOUS at 08:35

## 2021-05-17 RX ADMIN — PROPOFOL 5.72 MICROGRAM(S)/KG/MIN: 10 INJECTION, EMULSION INTRAVENOUS at 11:49

## 2021-05-17 RX ADMIN — PIPERACILLIN AND TAZOBACTAM 25 GRAM(S): 4; .5 INJECTION, POWDER, LYOPHILIZED, FOR SOLUTION INTRAVENOUS at 09:30

## 2021-05-17 RX ADMIN — Medication 2: at 01:44

## 2021-05-17 RX ADMIN — Medication 2: at 12:05

## 2021-05-17 RX ADMIN — CHLORHEXIDINE GLUCONATE 1 APPLICATION(S): 213 SOLUTION TOPICAL at 08:27

## 2021-05-17 RX ADMIN — PROPOFOL 10 MILLIGRAM(S): 10 INJECTION, EMULSION INTRAVENOUS at 11:40

## 2021-05-17 RX ADMIN — Medication 1000 MILLIGRAM(S): at 00:00

## 2021-05-17 RX ADMIN — FENTANYL CITRATE 100 MICROGRAM(S): 50 INJECTION INTRAVENOUS at 12:30

## 2021-05-17 RX ADMIN — Medication 15 MILLIGRAM(S): at 06:00

## 2021-05-17 RX ADMIN — FENTANYL CITRATE 2.38 MICROGRAM(S)/KG/HR: 50 INJECTION INTRAVENOUS at 11:50

## 2021-05-17 RX ADMIN — PROPOFOL 5.72 MICROGRAM(S)/KG/MIN: 10 INJECTION, EMULSION INTRAVENOUS at 16:43

## 2021-05-17 NOTE — PROGRESS NOTE ADULT - ASSESSMENT
61 yo M with medical history of HTN, DM and hyperlipidemia who was admitted initially for shortness of breath for few days and hypoxemia of 88% on RA, patient was found to have COVID-19 associated pneumonia, he was started on supplemental O2 along with Decadron and Remdisivir. Patient's condition gradually deteriorated to the point he required HFNC then BPAP, today pt was intubated as his SpO2 went down to low 80s% despite all non-invasive measures. In the ICU, patient was placed on V-AC mode with settings of 32/450/14/100, PEEP was up titrated gradually. He remained asynchronous with the ventilator despite adequate sedation, for which Nimbex drip was initiated. Also, his BP went down after high dose propofol, RIJ TLC with A line were inserted. Patient remained intubated for 2 days then was extubated to bipap on 5/13 where he has toggled between bipap and high flow.    5/3 Admitted to [facility]  5/11 Intubated transferred to 5 ICU and proned  5/12 supinated  5/13: New Pneumomediastinum  5/13: extubated to BiPAP 12/6/70%  5/17: proning attempted  Neuro:   - Pt with panic attach this am with acute hypoxia 78. Precedex started  - Following commands to all extremities      CV:   -Levophed restarted 2/2 to hypotention from precedex  - Episodes of chest pain (pleurtic?). EKG without changes. Troponin 7 --> 7. Resolved with Tylenol/toradol    Resp:   AHRF  - Extubated (5/13)  - Currently on Bipap 12/5/100  - Pnumomediastinum (5/13) resolving, .     GI:   - NPO as pt is on BIPAP  - BM 5/16  -C/w Protonix    Renal:   - Keep net even.   - Boss d/c'ed. One episode of retention. Bladder scan q8. Straight cath PRN.     Heme:   - Lovenox 40 q day.     ID:   Empiric/Fevers  - Zosyn (5/12 - 19).   - BCx 5/12 NGTD.   COVID  - S/p Dex and remdesivir 5/3-5/7     [facility]:   - [facility]  - Keep FS<180       61 yo M with medical history of HTN, DM and hyperlipidemia who was admitted initially for shortness of breath for few days and hypoxemia of 88% on RA, patient was found to have COVID-19 associated pneumonia, he was started on supplemental O2 along with Decadron and Remdisivir. Patient's condition gradually deteriorated to the point he required HFNC then BPAP, today pt was intubated as his SpO2 went down to low 80s% despite all non-invasive measures. In the ICU, patient was placed on V-AC mode with settings of 32/450/14/100, PEEP was up titrated gradually. He remained asynchronous with the ventilator despite adequate sedation, for which Nimbex drip was initiated. Also, his BP went down after high dose propofol, RIJ TLC with A line were inserted. Patient remained intubated for 2 days then was extubated to bipap on 5/13 where he has toggled between bipap and high flow.    5/3 Admitted to [facility]  5/11 Intubated transferred to 5 ICU and proned  5/12 supinated  5/13: New Pneumomediastinum  5/13: extubated to BiPAP 12/6/70%  5/17: proning attempted  5/17: Reintubated    Neuro:   - Pt with panic attach this am with acute hypoxia 78. Precedex started.  - Following commands to all extremities  - requiring fentanyl propofol and now versed as he is now intubated    CV:   -Levophed restarted 2/2 to hypotention from precedex  - Episodes of chest pain (pleurtic?). EKG without changes. Troponin 7 --> 7. Resolved with Tylenol/toradol    Resp:   AHRF  - Extubated (5/13)  - Reintubated 2/2 increased work of breathing  - Pnumomediastinum (5/13) resolving, .     GI:   - NPO as pt is on BIPAP  - BM 5/16  -C/w Protonix    Renal:   - Keep net even.   - Boss d/c'ed. One episode of retention. Bladder scan q8. Straight cath PRN.     Heme:   - Lovenox 40 q day.     ID:   Empiric/Fevers  - Zosyn (5/12 - 19).   - BCx 5/12 NGTD.   COVID  - S/p Dex and remdesivir 5/3-5/7     Endo:   - Cover with [facility]  - Keep FS<180       59 yo M with medical history of HTN, DM and hyperlipidemia who was admitted initially for shortness of breath for few days and hypoxemia of 88% on RA, patient was found to have COVID-19 associated pneumonia, he was started on supplemental O2 along with Decadron and Remdisivir. Patient's condition gradually deteriorated to the point he required HFNC then BPAP, today pt was intubated as his SpO2 went down to low 80s% despite all non-invasive measures. In the ICU, patient was placed on V-AC mode with settings of 32/450/14/100, PEEP was up titrated gradually. He remained asynchronous with the ventilator despite adequate sedation, for which Nimbex drip was initiated. Also, his BP went down after high dose propofol, RIJ TLC with A line were inserted. Patient remained intubated for 2 days then was extubated to bipap on 5/13 where he has toggled between bipap and high flow. After 3 days of extubation pt's work of breathing worsened and he became gradually more hypoxic requiring intubation(5/17)    5/3 Admitted to [facility]  5/11 Intubated transferred to 5 ICU and proned  5/12 supinated  5/13: New Pneumomediastinum  5/13: extubated to BiPAP 12/6/70%  5/17: proning attempted  5/17: Reintubated    Neuro:   - Pt with panic attach this am with acute hypoxia 78. Precedex started.  - Following commands to all extremities  - requiring fentanyl propofol and now versed as he is now intubated    CV:   -Levophed restarted 2/2 to hypotention from precedex  - Episodes of chest pain (pleurtic?). EKG without changes. Troponin 7 --> 7. Resolved with Tylenol/toradol    Resp:   AHRF  - Extubated (5/13)  - Reintubated 2/2 increased work of breathing, cxr verified ETT 25 at lip and in positioned  - Pnumomediastinum (5/13) resolving, .     GI:   - NPO for now  - BM 5/16  -C/w Protonix    Renal:   - Keep net even.   - Boss Reinserted     Heme:   - Lovenox 40 q day.  - H+H stable     ID:   Empiric/Fevers  - Zosyn (5/12 - 19).   - BCx 5/12 NGTD.   COVID  - S/p Dex and remdesivir 5/3-5/7     Endo:   - Cover with [facility]  - Keep FS<180    Family updated d/w Akash 59 yo M with medical history of HTN, DM and hyperlipidemia who was admitted initially for shortness of breath for few days and hypoxemia of 88% on RA, patient was found to have COVID-19 associated pneumonia, he was started on supplemental O2 along with Decadron and Remdisivir. Patient's condition gradually deteriorated to the point he required HFNC then BPAP, today pt was intubated as his SpO2 went down to low 80s% despite all non-invasive measures. In the ICU, patient was placed on V-AC mode with settings of 32/450/14/100, PEEP was up titrated gradually. He remained asynchronous with the ventilator despite adequate sedation, for which Nimbex drip was initiated. Also, his BP went down after high dose propofol, RIJ TLC with A line were inserted. Patient remained intubated for 2 days then was extubated to bipap on 5/13 where he has toggled between bipap and high flow. After 3 days of extubation pt's work of breathing worsened and he became gradually more hypoxic requiring intubation(5/17)    5/3 Admitted to [facility]  5/11 Intubated transferred to 5 ICU and proned  5/12 supinated  5/13: New Pneumomediastinum  5/13: extubated to BiPAP 12/6/70%  5/17: proning attempted  5/17: Reintubated and proned at 215pm    Neuro:   - Pt with panic attach this am with acute hypoxia 78.   - Following commands to all extremities  - requiring fentanyl propofol and paralyzed on Nimbex as he is now intubated    CV:   -Levophed restarted 2/2 to hypotention from precedex  - Episodes of chest pain (pleurtic?). EKG without changes. Troponin 7 --> 7. Resolved with Tylenol/toradol    Resp:   AHRF  - Extubated (5/13)  - Reintubated 2/2 increased work of breathing, cxr verified ETT 25 at lip and in position  - ABG post intubation shows PF ratio of 76. Pt  proned at 215pm to be supinated at 915 am on 5/18 (Vent settings /26/5/100%)  - Pnumomediastinum (5/13) resolving, .     GI:   - Trickle TF for now-20ccVital HP  - BM 5/16  -C/w Protonix    Renal:   - Keep net even.   - Boss Reinserted for critical care monitoring    Heme:   - Lovenox 40 q day.  - H+H stable     ID:   Empiric/Fevers  - Zosyn (5/12 - 18).   - BCx 5/12 NGTD.   COVID  - S/p Dex and remdesivir 5/3-5/7     Endo:   - Cover with [facility]  - Keep FS<180    Family updated d/w Akash

## 2021-05-17 NOTE — PROGRESS NOTE ADULT - ATTENDING COMMENTS
60 M with history above here with acute hypoxemic respiratory failure secondary to COVID-19 pneumonia. Patient has completed courses of dexamethasone and Remdesivir. He was intubated 5/11 and extubated 5/13. Despite HFNC and BiPAP his respiratory status gradually decompensated and he was re-intubated today (5/17/21). Will keep sedated on fentanyl and propofol. Start cisatracurium for ventilator dyssynchrony. Based on post-intubated ABG may require prone ventilation. Remainder of plan as above. Family updated on today's events. Prognosis guarded. 60 M with history above here with acute hypoxemic respiratory failure / ARDS secondary to COVID-19 pneumonia. Patient has completed courses of dexamethasone and Remdesivir. He was intubated 5/11 and extubated 5/13. Despite HFNC and BiPAP his respiratory status gradually decompensated and he was re-intubated today (5/17/21). Will keep sedated on fentanyl and propofol. Start cisatracurium for ventilator dyssynchrony. Based on post-intubated ABG may require prone ventilation. Remainder of plan as above. Family updated on today's events. Prognosis guarded. 60 M with history above here with acute hypoxemic respiratory failure / ARDS secondary to COVID-19 pneumonia. Patient has completed courses of dexamethasone and Remdesivir. He was intubated 5/11 and extubated 5/13. Despite HFNC and BiPAP his respiratory status gradually decompensated and he was re-intubated today (5/17/21). Will keep sedated on fentanyl and propofol. Start cisatracurium for ventilator dyssynchrony. Based on post-intubation ABG may require prone ventilation. Continue norepinephrine for shock - etiology severe sepsis with shock secondary to COVID-19 versus vasoplegia due to use of multiple sedatives. Remainder of plan as above. Family updated on today's events. Prognosis guarded.

## 2021-05-17 NOTE — PROCEDURE NOTE - NSINDICATIONS_GEN_A_CORE
critical illness/emergency venous access/venous access
critical patient
critical patient/respiratory distress/respiratory failure

## 2021-05-17 NOTE — PROGRESS NOTE ADULT - SUBJECTIVE AND OBJECTIVE BOX
CHIEF COMPLAINT:  Patient is a 60y old  Male who presents with a chief complaint of shortness of breath (16 May 2021 08:04)    HPI:  61 yo M with PMH of HTN, HLD, T2DM, presents here with SOB and hypoxia.  Patient started feeling fatigue and malaise on Monday.  Over the last three days, he also started having decreased appetite, mild cough, SOB, feverish, chills.  He has been mostly in bed, not able to do his usual ADLs.  This morning he had an episode of vomiting.  He was also having some back pain, which is worse with walking.  He had no diarrhea.  Daughter-in-law checked his pulse ox at home which was 88%, which prompted her to bring him to hospital.  While in ED, patient felt lethargic and had a brief 20 sec period of unresponsiveness.  Patient denies losing consciousness; states he remembers hearing his daughter-in-law calling his name, but he did not respond because he was not feeling well.  In ED, his vitals showed temp 98.5, HR 89, /70, RR 19-26, saturation 88% on RA.   (03 May 2021 03:11)      Interval Events:      REVIEW OF SYSTEMS:          OBJECTIVE:  ICU Vital Signs Last 24 Hrs  T(C): 36.4 (17 May 2021 08:00), Max: 37.6 (16 May 2021 20:00)  T(F): 97.5 (17 May 2021 08:00), Max: 99.7 (16 May 2021 20:00)  HR: 74 (17 May 2021 09:00) (74 - 101)  BP: 107/68 (16 May 2021 18:00) (107/68 - 107/68)  BP(mean): 82 (16 May 2021 18:00) (82 - 82)  ABP: 93/54 (17 May 2021 09:00) (72/44 - 130/57)  ABP(mean): 68 (17 May 2021 09:00) (56 - 80)  RR: 30 (17 May 2021 09:00) (14 - 45)  SpO2: 95% (17 May 2021 09:00) (83% - 95%)        05-16 @ 07:01  -  05-17 @ 07:00  --------------------------------------------------------  IN: 1475 mL / OUT: 1125 mL / NET: 350 mL    05-17 @ 07:01  -  05-17 @ 10:21  --------------------------------------------------------  IN: 45.2 mL / OUT: 0 mL / NET: 45.2 mL      CAPILLARY BLOOD GLUCOSE      POCT Blood Glucose.: 147 mg/dL (17 May 2021 07:01)          PHYSICAL EXAM:          HOSPITAL MEDICATIONS:  MEDICATIONS  (STANDING):  atorvastatin 20 milliGRAM(s) Oral at bedtime  chlorhexidine 4% Liquid 1 Application(s) Topical <User Schedule>  cholecalciferol 1000 Unit(s) Oral daily  dexMEDEtomidine Infusion 0.2 MICROgram(s)/kG/Hr (4.77 mL/Hr) IV Continuous <Continuous>  enoxaparin Injectable 40 milliGRAM(s) SubCutaneous daily  insulin lispro (ADMELOG) corrective regimen sliding scale   SubCutaneous every 6 hours  norepinephrine Infusion 0.05 MICROgram(s)/kG/Min (8.93 mL/Hr) IV Continuous <Continuous>  pantoprazole  Injectable 40 milliGRAM(s) IV Push daily  piperacillin/tazobactam IVPB.. 3.375 Gram(s) IV Intermittent every 8 hours  polyethylene glycol 3350 17 Gram(s) Oral two times a day  senna Syrup 10 milliLiter(s) Oral at bedtime    MEDICATIONS  (PRN):  acetaminophen   Tablet .. 650 milliGRAM(s) Oral every 6 hours PRN Temp greater or equal to 38C (100.4F), Mild Pain (1 - 3)  ALBUTerol    90 MICROgram(s) HFA Inhaler 1 Puff(s) Inhalation every 4 hours PRN Shortness of Breath      LABS:                        13.8   14.05 )-----------( 253      ( 17 May 2021 00:58 )             43.2     Hgb Trend: 13.8<--, 15.9<--, 14.3<--, 13.5<--, 14.0<--  05-17    138  |  100  |  56<H>  ----------------------------<  175<H>  3.3<L>   |  26  |  0.80    Ca    8.1<L>      17 May 2021 00:58  Phos  3.7     05-17  Mg     2.4     05-17    TPro  6.0  /  Alb  2.7<L>  /  TBili  1.0  /  DBili  x   /  AST  62<H>  /  ALT  71<H>  /  AlkPhos  109  05-17    LIVER FUNCTIONS - ( 17 May 2021 00:58 )  Alb: 2.7 g/dL / Pro: 6.0 g/dL / ALK PHOS: 109 U/L / ALT: 71 U/L / AST: 62 U/L / GGT: x           Creatinine Trend: 0.80<--, 0.71<--, 0.78<--, 0.65<--, 0.72<--, 0.75<--  PT/INR - ( 16 May 2021 00:58 )   PT: 13.3 sec;   INR: 1.11 ratio         PTT - ( 16 May 2021 00:58 )  PTT:30.9 sec    Arterial Blood Gas:  05-17 @ 07:26  7.46/43/69/30/92/6.2  ABG lactate: --  Arterial Blood Gas:  05-17 @ 00:54  7.45/47/72/32/94/6.9  ABG lactate: --  Arterial Blood Gas:  05-16 @ 02:37  7.46/45/72/31/94/6.9  ABG lactate: --  Arterial Blood Gas:  05-16 @ 01:01  7.47/42/55/30/88/6.1  ABG lactate: --  Arterial Blood Gas:  05-16 @ 00:32  7.48/41/42/30/77/6.6  ABG lactate: --  Arterial Blood Gas:  05-15 @ 17:49  7.47/40/73/29/94/5.1  ABG lactate: --  Arterial Blood Gas:  05-15 @ 12:44  7.49/42/72/31/94/7.3  ABG lactate: --        MICROBIOLOGY:     RADIOLOGY:  [ ] Reviewed and interpreted by me    EKG:       CHIEF COMPLAINT:  Patient is a 60y old  Male who presents with a chief complaint of shortness of breath (16 May 2021 08:04)    HPI:  59 yo M with PMH of HTN, HLD, T2DM, presents here with SOB and hypoxia.  Patient started feeling fatigue and malaise on Monday.  Over the last three days, he also started having decreased appetite, mild cough, SOB, feverish, chills.  He has been mostly in bed, not able to do his usual ADLs.  This morning he had an episode of vomiting.  He was also having some back pain, which is worse with walking.  He had no diarrhea.  Daughter-in-law checked his pulse ox at home which was 88%, which prompted her to bring him to hospital.  While in ED, patient felt lethargic and had a brief 20 sec period of unresponsiveness.  Patient denies losing consciousness; states he remembers hearing his daughter-in-law calling his name, but he did not respond because he was not feeling well.  In ED, his vitals showed temp 98.5, HR 89, /70, RR 19-26, saturation 88% on RA.   (03 May 2021 03:11)      Interval Events: desat overnight requiring dilaudid and NRB mask over the High flow      REVIEW OF SYSTEMS: unable to as pt is now sedated          OBJECTIVE:  ICU Vital Signs Last 24 Hrs  T(C): 36.4 (17 May 2021 08:00), Max: 37.6 (16 May 2021 20:00)  T(F): 97.5 (17 May 2021 08:00), Max: 99.7 (16 May 2021 20:00)  HR: 74 (17 May 2021 09:00) (74 - 101)  BP: 107/68 (16 May 2021 18:00) (107/68 - 107/68)  BP(mean): 82 (16 May 2021 18:00) (82 - 82)  ABP: 93/54 (17 May 2021 09:00) (72/44 - 130/57)  ABP(mean): 68 (17 May 2021 09:00) (56 - 80)  RR: 30 (17 May 2021 09:00) (14 - 45)  SpO2: 95% (17 May 2021 09:00) (83% - 95%)        05-16 @ 07:01  -  05-17 @ 07:00  --------------------------------------------------------  IN: 1475 mL / OUT: 1125 mL / NET: 350 mL    05-17 @ 07:01 - 05-17 @ 10:21  --------------------------------------------------------  IN: 45.2 mL / OUT: 0 mL / NET: 45.2 mL      CAPILLARY BLOOD GLUCOSE      POCT Blood Glucose.: 147 mg/dL (17 May 2021 07:01)          PHYSICAL EXAM:  GENERAL: NAD,  HEENT:  Atraumatic, Normocephalic  EYES: PERRLA, conjunctiva and sclera clear  NECK: Supple, No JVD  CHEST/LUNG: diminished bilaterally; No wheezes, rales, or rhonchi  HEART: Regular rate and rhythm; No murmurs, rubs, or gallops  ABDOMEN: Soft, Nontender, Nondistended; Bowel sounds present  EXTREMITIES:  2+ Peripheral Pulses, No clubbing, cyanosis, or edema  PSYCH: unable as pt is sedated  NEUROLOGY: sedated  SKIN: No rashes or lesions        HOSPITAL MEDICATIONS:  MEDICATIONS  (STANDING):  atorvastatin 20 milliGRAM(s) Oral at bedtime  chlorhexidine 4% Liquid 1 Application(s) Topical <User Schedule>  cholecalciferol 1000 Unit(s) Oral daily  dexMEDEtomidine Infusion 0.2 MICROgram(s)/kG/Hr (4.77 mL/Hr) IV Continuous <Continuous>  enoxaparin Injectable 40 milliGRAM(s) SubCutaneous daily  insulin lispro (ADMELOG) corrective regimen sliding scale   SubCutaneous every 6 hours  norepinephrine Infusion 0.05 MICROgram(s)/kG/Min (8.93 mL/Hr) IV Continuous <Continuous>  pantoprazole  Injectable 40 milliGRAM(s) IV Push daily  piperacillin/tazobactam IVPB.. 3.375 Gram(s) IV Intermittent every 8 hours  polyethylene glycol 3350 17 Gram(s) Oral two times a day  senna Syrup 10 milliLiter(s) Oral at bedtime    MEDICATIONS  (PRN):  acetaminophen   Tablet .. 650 milliGRAM(s) Oral every 6 hours PRN Temp greater or equal to 38C (100.4F), Mild Pain (1 - 3)  ALBUTerol    90 MICROgram(s) HFA Inhaler 1 Puff(s) Inhalation every 4 hours PRN Shortness of Breath      LABS:                        13.8   14.05 )-----------( 253      ( 17 May 2021 00:58 )             43.2     Hgb Trend: 13.8<--, 15.9<--, 14.3<--, 13.5<--, 14.0<--  05-17    138  |  100  |  56<H>  ----------------------------<  175<H>  3.3<L>   |  26  |  0.80    Ca    8.1<L>      17 May 2021 00:58  Phos  3.7     05-17  Mg     2.4     05-17    TPro  6.0  /  Alb  2.7<L>  /  TBili  1.0  /  DBili  x   /  AST  62<H>  /  ALT  71<H>  /  AlkPhos  109  05-17    LIVER FUNCTIONS - ( 17 May 2021 00:58 )  Alb: 2.7 g/dL / Pro: 6.0 g/dL / ALK PHOS: 109 U/L / ALT: 71 U/L / AST: 62 U/L / GGT: x           Creatinine Trend: 0.80<--, 0.71<--, 0.78<--, 0.65<--, 0.72<--, 0.75<--  PT/INR - ( 16 May 2021 00:58 )   PT: 13.3 sec;   INR: 1.11 ratio         PTT - ( 16 May 2021 00:58 )  PTT:30.9 sec    Arterial Blood Gas:  05-17 @ 07:26  7.46/43/69/30/92/6.2  ABG lactate: --  Arterial Blood Gas:  05-17 @ 00:54  7.45/47/72/32/94/6.9  ABG lactate: --  Arterial Blood Gas:  05-16 @ 02:37  7.46/45/72/31/94/6.9  ABG lactate: --  Arterial Blood Gas:  05-16 @ 01:01  7.47/42/55/30/88/6.1  ABG lactate: --  Arterial Blood Gas:  05-16 @ 00:32  7.48/41/42/30/77/6.6  ABG lactate: --  Arterial Blood Gas:  05-15 @ 17:49  7.47/40/73/29/94/5.1  ABG lactate: --  Arterial Blood Gas:  05-15 @ 12:44  7.49/42/72/31/94/7.3  ABG lactate: --        MICROBIOLOGY:     RADIOLOGY:  [ ] Reviewed and interpreted by me    EKG:       CHIEF COMPLAINT:  Patient is a 60y old  Male who presents with a chief complaint of shortness of breath (16 May 2021 08:04)    HPI:  59 yo M with PMH of HTN, HLD, T2DM, presents here with SOB and hypoxia.  Patient started feeling fatigue and malaise on Monday.  Over the last three days, he also started having decreased appetite, mild cough, SOB, feverish, chills.  He has been mostly in bed, not able to do his usual ADLs.  This morning he had an episode of vomiting.  He was also having some back pain, which is worse with walking.  He had no diarrhea.  Daughter-in-law checked his pulse ox at home which was 88%, which prompted her to bring him to hospital.  While in ED, patient felt lethargic and had a brief 20 sec period of unresponsiveness.  Patient denies losing consciousness; states he remembers hearing his daughter-in-law calling his name, but he did not respond because he was not feeling well.  In ED, his vitals showed temp 98.5, HR 89, /70, RR 19-26, saturation 88% on RA.       Interval Events: desat overnight requiring dilaudid and NRB mask over the High flow      REVIEW OF SYSTEMS: unable to as pt is now sedated          OBJECTIVE:  ICU Vital Signs Last 24 Hrs  T(C): 36.4 (17 May 2021 08:00), Max: 37.6 (16 May 2021 20:00)  T(F): 97.5 (17 May 2021 08:00), Max: 99.7 (16 May 2021 20:00)  HR: 74 (17 May 2021 09:00) (74 - 101)  BP: 107/68 (16 May 2021 18:00) (107/68 - 107/68)  BP(mean): 82 (16 May 2021 18:00) (82 - 82)  ABP: 93/54 (17 May 2021 09:00) (72/44 - 130/57)  ABP(mean): 68 (17 May 2021 09:00) (56 - 80)  RR: 30 (17 May 2021 09:00) (14 - 45)  SpO2: 95% (17 May 2021 09:00) (83% - 95%)        05-16 @ 07:01  -  05-17 @ 07:00  --------------------------------------------------------  IN: 1475 mL / OUT: 1125 mL / NET: 350 mL    05-17 @ 07:01  - 05-17 @ 10:21  --------------------------------------------------------  IN: 45.2 mL / OUT: 0 mL / NET: 45.2 mL      CAPILLARY BLOOD GLUCOSE      POCT Blood Glucose.: 147 mg/dL (17 May 2021 07:01)          PHYSICAL EXAM:  GENERAL: NAD,  HEENT:  Atraumatic, Normocephalic  EYES: PERRLA, conjunctiva and sclera clear  NECK: Supple, No JVD  CHEST/LUNG: diminished bilaterally; No wheezes, rales, or rhonchi  HEART: Regular rate and rhythm; No murmurs, rubs, or gallops  ABDOMEN: Soft, Nontender, Nondistended; Bowel sounds present  EXTREMITIES:  2+ Peripheral Pulses, No clubbing, cyanosis, or edema  PSYCH: unable as pt is sedated  NEUROLOGY: sedated  SKIN: No rashes or lesions        HOSPITAL MEDICATIONS:  MEDICATIONS  (STANDING):  atorvastatin 20 milliGRAM(s) Oral at bedtime  chlorhexidine 4% Liquid 1 Application(s) Topical <User Schedule>  cholecalciferol 1000 Unit(s) Oral daily  dexMEDEtomidine Infusion 0.2 MICROgram(s)/kG/Hr (4.77 mL/Hr) IV Continuous <Continuous>  enoxaparin Injectable 40 milliGRAM(s) SubCutaneous daily  insulin lispro (ADMELOG) corrective regimen sliding scale   SubCutaneous every 6 hours  norepinephrine Infusion 0.05 MICROgram(s)/kG/Min (8.93 mL/Hr) IV Continuous <Continuous>  pantoprazole  Injectable 40 milliGRAM(s) IV Push daily  piperacillin/tazobactam IVPB.. 3.375 Gram(s) IV Intermittent every 8 hours  polyethylene glycol 3350 17 Gram(s) Oral two times a day  senna Syrup 10 milliLiter(s) Oral at bedtime    MEDICATIONS  (PRN):  acetaminophen   Tablet .. 650 milliGRAM(s) Oral every 6 hours PRN Temp greater or equal to 38C (100.4F), Mild Pain (1 - 3)  ALBUTerol    90 MICROgram(s) HFA Inhaler 1 Puff(s) Inhalation every 4 hours PRN Shortness of Breath      LABS:                        13.8   14.05 )-----------( 253      ( 17 May 2021 00:58 )             43.2     Hgb Trend: 13.8<--, 15.9<--, 14.3<--, 13.5<--, 14.0<--  05-17    138  |  100  |  56<H>  ----------------------------<  175<H>  3.3<L>   |  26  |  0.80    Ca    8.1<L>      17 May 2021 00:58  Phos  3.7     05-17  Mg     2.4     05-17    TPro  6.0  /  Alb  2.7<L>  /  TBili  1.0  /  DBili  x   /  AST  62<H>  /  ALT  71<H>  /  AlkPhos  109  05-17    LIVER FUNCTIONS - ( 17 May 2021 00:58 )  Alb: 2.7 g/dL / Pro: 6.0 g/dL / ALK PHOS: 109 U/L / ALT: 71 U/L / AST: 62 U/L / GGT: x           Creatinine Trend: 0.80<--, 0.71<--, 0.78<--, 0.65<--, 0.72<--, 0.75<--  PT/INR - ( 16 May 2021 00:58 )   PT: 13.3 sec;   INR: 1.11 ratio         PTT - ( 16 May 2021 00:58 )  PTT:30.9 sec    Arterial Blood Gas:  05-17 @ 07:26  7.46/43/69/30/92/6.2  ABG lactate: --  Arterial Blood Gas:  05-17 @ 00:54  7.45/47/72/32/94/6.9  ABG lactate: --  Arterial Blood Gas:  05-16 @ 02:37  7.46/45/72/31/94/6.9  ABG lactate: --  Arterial Blood Gas:  05-16 @ 01:01  7.47/42/55/30/88/6.1  ABG lactate: --  Arterial Blood Gas:  05-16 @ 00:32  7.48/41/42/30/77/6.6  ABG lactate: --  Arterial Blood Gas:  05-15 @ 17:49  7.47/40/73/29/94/5.1  ABG lactate: --  Arterial Blood Gas:  05-15 @ 12:44  7.49/42/72/31/94/7.3  ABG lactate: --        MICROBIOLOGY:     RADIOLOGY:  [ ] Reviewed and interpreted by me    EKG:

## 2021-05-17 NOTE — PROCEDURE NOTE - NSPROCDETAILS_GEN_ALL_CORE
guidewire recovered/lumen(s) aspirated and flushed/sterile dressing applied/sterile technique, catheter placed/ultrasound guidance with use of sterile gel and probe cove
location identified, draped/prepped, sterile technique used, needle inserted/introduced/positive blood return obtained via catheter/connected to a pressurized flush line/sutured in place/hemostasis with direct pressure, dressing applied/Seldinger technique/all materials/supplies accounted for at end of procedure
patient pre-oxygenated, tube inserted, placement confirmed

## 2021-05-18 DIAGNOSIS — K11.20 SIALOADENITIS, UNSPECIFIED: ICD-10-CM

## 2021-05-18 LAB
ALBUMIN SERPL ELPH-MCNC: 2.7 G/DL — LOW (ref 3.3–5)
ALP SERPL-CCNC: 144 U/L — HIGH (ref 40–120)
ALT FLD-CCNC: 89 U/L — HIGH (ref 10–45)
ANION GAP SERPL CALC-SCNC: 15 MMOL/L — SIGNIFICANT CHANGE UP (ref 5–17)
APTT BLD: 157 SEC — CRITICAL HIGH (ref 27.5–35.5)
APTT BLD: 32.4 SEC — SIGNIFICANT CHANGE UP (ref 27.5–35.5)
APTT BLD: 62.6 SEC — HIGH (ref 27.5–35.5)
AST SERPL-CCNC: 54 U/L — HIGH (ref 10–40)
BILIRUB SERPL-MCNC: 1.3 MG/DL — HIGH (ref 0.2–1.2)
BUN SERPL-MCNC: 62 MG/DL — HIGH (ref 7–23)
CALCIUM SERPL-MCNC: 8.7 MG/DL — SIGNIFICANT CHANGE UP (ref 8.4–10.5)
CHLORIDE SERPL-SCNC: 99 MMOL/L — SIGNIFICANT CHANGE UP (ref 96–108)
CO2 SERPL-SCNC: 21 MMOL/L — LOW (ref 22–31)
CREAT SERPL-MCNC: 1.13 MG/DL — SIGNIFICANT CHANGE UP (ref 0.5–1.3)
FERRITIN SERPL-MCNC: 1975 NG/ML — HIGH (ref 30–400)
GAS PNL BLDA: SIGNIFICANT CHANGE UP
GLUCOSE BLDC GLUCOMTR-MCNC: 183 MG/DL — HIGH (ref 70–99)
GLUCOSE BLDC GLUCOMTR-MCNC: 186 MG/DL — HIGH (ref 70–99)
GLUCOSE BLDC GLUCOMTR-MCNC: 201 MG/DL — HIGH (ref 70–99)
GLUCOSE SERPL-MCNC: 210 MG/DL — HIGH (ref 70–99)
GRAM STN FLD: SIGNIFICANT CHANGE UP
HCT VFR BLD CALC: 46.7 % — SIGNIFICANT CHANGE UP (ref 39–50)
HGB BLD-MCNC: 14.7 G/DL — SIGNIFICANT CHANGE UP (ref 13–17)
MAGNESIUM SERPL-MCNC: 2.5 MG/DL — SIGNIFICANT CHANGE UP (ref 1.6–2.6)
MCHC RBC-ENTMCNC: 28.6 PG — SIGNIFICANT CHANGE UP (ref 27–34)
MCHC RBC-ENTMCNC: 31.5 GM/DL — LOW (ref 32–36)
MCV RBC AUTO: 90.9 FL — SIGNIFICANT CHANGE UP (ref 80–100)
NRBC # BLD: 0 /100 WBCS — SIGNIFICANT CHANGE UP (ref 0–0)
PHOSPHATE SERPL-MCNC: 4.5 MG/DL — SIGNIFICANT CHANGE UP (ref 2.5–4.5)
PLATELET # BLD AUTO: 321 K/UL — SIGNIFICANT CHANGE UP (ref 150–400)
POTASSIUM SERPL-MCNC: 4.4 MMOL/L — SIGNIFICANT CHANGE UP (ref 3.5–5.3)
POTASSIUM SERPL-SCNC: 4.4 MMOL/L — SIGNIFICANT CHANGE UP (ref 3.5–5.3)
PROCALCITONIN SERPL-MCNC: 1.18 NG/ML — HIGH (ref 0.02–0.1)
PROT SERPL-MCNC: 6.9 G/DL — SIGNIFICANT CHANGE UP (ref 6–8.3)
RBC # BLD: 5.14 M/UL — SIGNIFICANT CHANGE UP (ref 4.2–5.8)
RBC # FLD: 12.3 % — SIGNIFICANT CHANGE UP (ref 10.3–14.5)
SODIUM SERPL-SCNC: 135 MMOL/L — SIGNIFICANT CHANGE UP (ref 135–145)
SPECIMEN SOURCE: SIGNIFICANT CHANGE UP
WBC # BLD: 18.14 K/UL — HIGH (ref 3.8–10.5)
WBC # FLD AUTO: 18.14 K/UL — HIGH (ref 3.8–10.5)

## 2021-05-18 PROCEDURE — 76604 US EXAM CHEST: CPT | Mod: 26,GC

## 2021-05-18 PROCEDURE — 99292 CRITICAL CARE ADDL 30 MIN: CPT | Mod: 25

## 2021-05-18 PROCEDURE — 93010 ELECTROCARDIOGRAM REPORT: CPT

## 2021-05-18 PROCEDURE — 71045 X-RAY EXAM CHEST 1 VIEW: CPT | Mod: 26

## 2021-05-18 PROCEDURE — 93306 TTE W/DOPPLER COMPLETE: CPT | Mod: 26

## 2021-05-18 PROCEDURE — 99291 CRITICAL CARE FIRST HOUR: CPT

## 2021-05-18 PROCEDURE — 99222 1ST HOSP IP/OBS MODERATE 55: CPT

## 2021-05-18 RX ORDER — METOPROLOL TARTRATE 50 MG
5 TABLET ORAL ONCE
Refills: 0 | Status: COMPLETED | OUTPATIENT
Start: 2021-05-18 | End: 2021-05-18

## 2021-05-18 RX ORDER — KETAMINE HYDROCHLORIDE 100 MG/ML
0.25 INJECTION INTRAMUSCULAR; INTRAVENOUS
Qty: 1000 | Refills: 0 | Status: DISCONTINUED | OUTPATIENT
Start: 2021-05-18 | End: 2021-05-21

## 2021-05-18 RX ORDER — AMIODARONE HYDROCHLORIDE 400 MG/1
150 TABLET ORAL ONCE
Refills: 0 | Status: COMPLETED | OUTPATIENT
Start: 2021-05-18 | End: 2021-05-18

## 2021-05-18 RX ORDER — VASOPRESSIN 20 [USP'U]/ML
0.04 INJECTION INTRAVENOUS
Qty: 50 | Refills: 0 | Status: DISCONTINUED | OUTPATIENT
Start: 2021-05-18 | End: 2021-05-21

## 2021-05-18 RX ORDER — HEPARIN SODIUM 5000 [USP'U]/ML
7500 INJECTION INTRAVENOUS; SUBCUTANEOUS EVERY 6 HOURS
Refills: 0 | Status: DISCONTINUED | OUTPATIENT
Start: 2021-05-18 | End: 2021-05-18

## 2021-05-18 RX ORDER — VANCOMYCIN HCL 1 G
1000 VIAL (EA) INTRAVENOUS ONCE
Refills: 0 | Status: COMPLETED | OUTPATIENT
Start: 2021-05-18 | End: 2021-05-18

## 2021-05-18 RX ORDER — SODIUM CHLORIDE 9 MG/ML
500 INJECTION, SOLUTION INTRAVENOUS ONCE
Refills: 0 | Status: COMPLETED | OUTPATIENT
Start: 2021-05-18 | End: 2021-05-18

## 2021-05-18 RX ORDER — IPRATROPIUM/ALBUTEROL SULFATE 18-103MCG
3 AEROSOL WITH ADAPTER (GRAM) INHALATION EVERY 6 HOURS
Refills: 0 | Status: DISCONTINUED | OUTPATIENT
Start: 2021-05-18 | End: 2021-05-21

## 2021-05-18 RX ORDER — ESMOLOL HCL 100MG/10ML
50 VIAL (ML) INTRAVENOUS
Qty: 2500 | Refills: 0 | Status: DISCONTINUED | OUTPATIENT
Start: 2021-05-18 | End: 2021-05-18

## 2021-05-18 RX ORDER — FUROSEMIDE 40 MG
40 TABLET ORAL ONCE
Refills: 0 | Status: COMPLETED | OUTPATIENT
Start: 2021-05-18 | End: 2021-05-18

## 2021-05-18 RX ORDER — HYDROMORPHONE HYDROCHLORIDE 2 MG/ML
20 INJECTION INTRAMUSCULAR; INTRAVENOUS; SUBCUTANEOUS ONCE
Refills: 0 | Status: DISCONTINUED | OUTPATIENT
Start: 2021-05-18 | End: 2021-05-18

## 2021-05-18 RX ORDER — HEPARIN SODIUM 5000 [USP'U]/ML
INJECTION INTRAVENOUS; SUBCUTANEOUS
Qty: 25000 | Refills: 0 | Status: DISCONTINUED | OUTPATIENT
Start: 2021-05-18 | End: 2021-05-18

## 2021-05-18 RX ORDER — HEPARIN SODIUM 5000 [USP'U]/ML
1000 INJECTION INTRAVENOUS; SUBCUTANEOUS
Qty: 25000 | Refills: 0 | Status: DISCONTINUED | OUTPATIENT
Start: 2021-05-18 | End: 2021-05-20

## 2021-05-18 RX ORDER — MIDAZOLAM HYDROCHLORIDE 1 MG/ML
0.02 INJECTION, SOLUTION INTRAMUSCULAR; INTRAVENOUS
Qty: 100 | Refills: 0 | Status: DISCONTINUED | OUTPATIENT
Start: 2021-05-18 | End: 2021-05-21

## 2021-05-18 RX ORDER — AMIODARONE HYDROCHLORIDE 400 MG/1
1 TABLET ORAL
Qty: 900 | Refills: 0 | Status: DISCONTINUED | OUTPATIENT
Start: 2021-05-18 | End: 2021-05-18

## 2021-05-18 RX ORDER — HYDROMORPHONE HYDROCHLORIDE 2 MG/ML
2 INJECTION INTRAMUSCULAR; INTRAVENOUS; SUBCUTANEOUS ONCE
Refills: 0 | Status: DISCONTINUED | OUTPATIENT
Start: 2021-05-18 | End: 2021-05-18

## 2021-05-18 RX ORDER — MIDAZOLAM HYDROCHLORIDE 1 MG/ML
4 INJECTION, SOLUTION INTRAMUSCULAR; INTRAVENOUS ONCE
Refills: 0 | Status: DISCONTINUED | OUTPATIENT
Start: 2021-05-18 | End: 2021-05-18

## 2021-05-18 RX ORDER — MEROPENEM 1 G/30ML
1000 INJECTION INTRAVENOUS EVERY 8 HOURS
Refills: 0 | Status: DISCONTINUED | OUTPATIENT
Start: 2021-05-18 | End: 2021-05-21

## 2021-05-18 RX ORDER — HEPARIN SODIUM 5000 [USP'U]/ML
3500 INJECTION INTRAVENOUS; SUBCUTANEOUS EVERY 6 HOURS
Refills: 0 | Status: DISCONTINUED | OUTPATIENT
Start: 2021-05-18 | End: 2021-05-18

## 2021-05-18 RX ORDER — HEPARIN SODIUM 5000 [USP'U]/ML
7500 INJECTION INTRAVENOUS; SUBCUTANEOUS ONCE
Refills: 0 | Status: COMPLETED | OUTPATIENT
Start: 2021-05-18 | End: 2021-05-18

## 2021-05-18 RX ORDER — PHENYLEPHRINE HYDROCHLORIDE 10 MG/ML
3 INJECTION INTRAVENOUS
Qty: 160 | Refills: 0 | Status: DISCONTINUED | OUTPATIENT
Start: 2021-05-18 | End: 2021-05-21

## 2021-05-18 RX ORDER — PHENYLEPHRINE HYDROCHLORIDE 10 MG/ML
0.1 INJECTION INTRAVENOUS
Qty: 40 | Refills: 0 | Status: DISCONTINUED | OUTPATIENT
Start: 2021-05-18 | End: 2021-05-18

## 2021-05-18 RX ADMIN — Medication 2: at 12:08

## 2021-05-18 RX ADMIN — PANTOPRAZOLE SODIUM 40 MILLIGRAM(S): 20 TABLET, DELAYED RELEASE ORAL at 12:08

## 2021-05-18 RX ADMIN — Medication 250 MILLIGRAM(S): at 16:48

## 2021-05-18 RX ADMIN — Medication 4: at 05:56

## 2021-05-18 RX ADMIN — CISATRACURIUM BESYLATE 17.2 MICROGRAM(S)/KG/MIN: 2 INJECTION INTRAVENOUS at 09:57

## 2021-05-18 RX ADMIN — MIDAZOLAM HYDROCHLORIDE 4 MILLIGRAM(S): 1 INJECTION, SOLUTION INTRAMUSCULAR; INTRAVENOUS at 12:33

## 2021-05-18 RX ADMIN — PIPERACILLIN AND TAZOBACTAM 25 GRAM(S): 4; .5 INJECTION, POWDER, LYOPHILIZED, FOR SOLUTION INTRAVENOUS at 09:57

## 2021-05-18 RX ADMIN — Medication 5 MILLIGRAM(S): at 03:59

## 2021-05-18 RX ADMIN — PROPOFOL 5.72 MICROGRAM(S)/KG/MIN: 10 INJECTION, EMULSION INTRAVENOUS at 09:57

## 2021-05-18 RX ADMIN — Medication 2: at 17:23

## 2021-05-18 RX ADMIN — HYDROMORPHONE HYDROCHLORIDE 2 MILLIGRAM(S): 2 INJECTION INTRAMUSCULAR; INTRAVENOUS; SUBCUTANEOUS at 16:20

## 2021-05-18 RX ADMIN — Medication 40 MILLIGRAM(S): at 01:59

## 2021-05-18 RX ADMIN — HYDROMORPHONE HYDROCHLORIDE 2 MILLIGRAM(S): 2 INJECTION INTRAMUSCULAR; INTRAVENOUS; SUBCUTANEOUS at 18:12

## 2021-05-18 RX ADMIN — Medication 5 MILLIGRAM(S): at 03:58

## 2021-05-18 RX ADMIN — SODIUM CHLORIDE 1000 MILLILITER(S): 9 INJECTION, SOLUTION INTRAVENOUS at 12:00

## 2021-05-18 RX ADMIN — AMIODARONE HYDROCHLORIDE 600 MILLIGRAM(S): 400 TABLET ORAL at 03:54

## 2021-05-18 RX ADMIN — PIPERACILLIN AND TAZOBACTAM 25 GRAM(S): 4; .5 INJECTION, POWDER, LYOPHILIZED, FOR SOLUTION INTRAVENOUS at 01:24

## 2021-05-18 RX ADMIN — PHENYLEPHRINE HYDROCHLORIDE 53.6 MICROGRAM(S)/KG/MIN: 10 INJECTION INTRAVENOUS at 07:33

## 2021-05-18 RX ADMIN — Medication 1000 UNIT(S): at 12:08

## 2021-05-18 RX ADMIN — HEPARIN SODIUM 7500 UNIT(S): 5000 INJECTION INTRAVENOUS; SUBCUTANEOUS at 05:11

## 2021-05-18 RX ADMIN — CHLORHEXIDINE GLUCONATE 1 APPLICATION(S): 213 SOLUTION TOPICAL at 06:05

## 2021-05-18 RX ADMIN — VASOPRESSIN 2.4 UNIT(S)/MIN: 20 INJECTION INTRAVENOUS at 14:18

## 2021-05-18 RX ADMIN — HEPARIN SODIUM 10 UNIT(S)/HR: 5000 INJECTION INTRAVENOUS; SUBCUTANEOUS at 13:42

## 2021-05-18 RX ADMIN — AMIODARONE HYDROCHLORIDE 33.3 MG/MIN: 400 TABLET ORAL at 03:59

## 2021-05-18 RX ADMIN — KETAMINE HYDROCHLORIDE 2.38 MG/KG/HR: 100 INJECTION INTRAMUSCULAR; INTRAVENOUS at 14:17

## 2021-05-18 RX ADMIN — MEROPENEM 100 MILLIGRAM(S): 1 INJECTION INTRAVENOUS at 21:13

## 2021-05-18 RX ADMIN — Medication 1000 MILLIGRAM(S): at 00:28

## 2021-05-18 RX ADMIN — KETAMINE HYDROCHLORIDE 2.38 MG/KG/HR: 100 INJECTION INTRAMUSCULAR; INTRAVENOUS at 17:27

## 2021-05-18 RX ADMIN — AMIODARONE HYDROCHLORIDE 600 MILLIGRAM(S): 400 TABLET ORAL at 02:54

## 2021-05-18 RX ADMIN — MIDAZOLAM HYDROCHLORIDE 1.91 MG/KG/HR: 1 INJECTION, SOLUTION INTRAMUSCULAR; INTRAVENOUS at 13:17

## 2021-05-18 RX ADMIN — CHLORHEXIDINE GLUCONATE 15 MILLILITER(S): 213 SOLUTION TOPICAL at 17:24

## 2021-05-18 NOTE — CONSULT NOTE ADULT - ATTENDING COMMENTS
left preauricular swelling with overlying mild skin breakdown/sloughing. could be early parotitis however favor this is pressure induces soft tissue injury related to proning. will re-eval tomorrow when pt is supine to better eval.

## 2021-05-18 NOTE — CONSULT NOTE ADULT - SUBJECTIVE AND OBJECTIVE BOX
CC: Left facial swelling and erythema    HPI: 61 yo M with PMH of HTN, HLD, T2DM, presents here with SOB and hypoxia, fatigue and malaise.  He had no diarrhea.  Daughter-in-law checked his pulse ox at home which was 88%, which prompted her to bring him to hospital.  While in ED, patient felt lethargic and had a brief 20 sec period of unresponsiveness.  Patient denies losing consciousness; states he remembers hearing his daughter-in-law calling his name, but he did not respond because he was not feeling well. Pt was intubated and failed extubation and was reintubated. ENT was called to see pt for left facial swelling and erythema that was noted during proning          PAST MEDICAL & SURGICAL HISTORY:  HTN (hypertension)    HLD (hyperlipidemia)    T2DM (type 2 diabetes mellitus)    No pertinent past surgical history      Allergies    No Known Allergies    Intolerances      MEDICATIONS  (STANDING):  chlorhexidine 0.12% Liquid 15 milliLiter(s) Oral Mucosa every 12 hours  chlorhexidine 4% Liquid 1 Application(s) Topical <User Schedule>  cholecalciferol 1000 Unit(s) Oral daily  cisatracurium Infusion 3 MICROgram(s)/kG/Min (17.2 mL/Hr) IV Continuous <Continuous>  fentaNYL   Infusion... 0.5 MICROgram(s)/kG/Hr (2.38 mL/Hr) IV Continuous <Continuous>  heparin  Infusion 1000 Unit(s)/Hr (10 mL/Hr) IV Continuous <Continuous>  insulin lispro (ADMELOG) corrective regimen sliding scale   SubCutaneous every 6 hours  ketamine Infusion. 0.25 mG/kG/Hr (2.38 mL/Hr) IV Continuous <Continuous>  meropenem  IVPB 1000 milliGRAM(s) IV Intermittent every 8 hours  midazolam Infusion 0.02 mG/kG/Hr (1.91 mL/Hr) IV Continuous <Continuous>  norepinephrine Infusion 0.05 MICROgram(s)/kG/Min (8.93 mL/Hr) IV Continuous <Continuous>  pantoprazole  Injectable 40 milliGRAM(s) IV Push daily  phenylephrine    Infusion 3 MICROgram(s)/kG/Min (53.6 mL/Hr) IV Continuous <Continuous>  polyethylene glycol 3350 17 Gram(s) Oral two times a day  propofol Infusion 10 MICROgram(s)/kG/Min (5.72 mL/Hr) IV Continuous <Continuous>  senna Syrup 10 milliLiter(s) Oral at bedtime  vancomycin  IVPB 1000 milliGRAM(s) IV Intermittent once  vasopressin Infusion 0.04 Unit(s)/Min (2.4 mL/Hr) IV Continuous <Continuous>    MEDICATIONS  (PRN):  acetaminophen   Tablet .. 650 milliGRAM(s) Oral every 6 hours PRN Temp greater or equal to 38C (100.4F), Mild Pain (1 - 3)  ALBUTerol    90 MICROgram(s) HFA Inhaler 1 Puff(s) Inhalation every 4 hours PRN Shortness of Breath      Social History: **??**    Family history: **??**    ROS:   ENT: all negative except as noted in HPI   CV: denies palpitations  Pulm: denies SOB, cough, hemoptysis  GI: denies change in apetite, indigestion, n/v  : denies pertinent urinary symptoms, urgency  Neuro: denies numbness/tingling, loss of sensation  Psych: denies anxiety  MS: denies muscle weakness, instability  Heme: denies easy bruising or bleeding  Endo: denies heat/cold intolerance, excessive sweating  Vascular: denies LE edema    Vital Signs Last 24 Hrs  T(C): 36.3 (18 May 2021 14:00), Max: 39.5 (18 May 2021 00:00)  T(F): 97.3 (18 May 2021 14:00), Max: 103.1 (18 May 2021 00:00)  HR: 61 (18 May 2021 15:23) (51 - 176)  BP: 146/74 (18 May 2021 10:45) (146/74 - 146/74)  BP(mean): 103 (18 May 2021 10:45) (103 - 103)  RR: 26 (18 May 2021 14:45) (0 - 27)  SpO2: 94% (18 May 2021 15:23) (82% - 98%)                          14.7   18.14 )-----------( 321      ( 18 May 2021 00:17 )             46.7    05-18    135  |  99  |  62<H>  ----------------------------<  210<H>  4.4   |  21<L>  |  1.13    Ca    8.7      18 May 2021 00:17  Phos  4.5     05-18  Mg     2.5     05-18    TPro  6.9  /  Alb  2.7<L>  /  TBili  1.3<H>  /  DBili  x   /  AST  54<H>  /  ALT  89<H>  /  AlkPhos  144<H>  05-18   PTT - ( 18 May 2021 11:14 )  PTT:157.0 sec    PHYSICAL EXAM:  Gen: NAD  Skin: No rashes, bruises, or lesions  Head: Normocephalic, Atraumatic  Face: no edema, erythema, or fluctuance. Parotid glands soft without mass  Eyes: no scleral injection  Ears: Right - ear canal clear, TM intact without effusion or erythema. No evidence of any fluid drainage. No mastoid tenderness, erythema, or ear bulging            Left - ear canal clear, TM intact without effusion or erythema. No evidence of any fluid drainage. No mastoid tenderness, erythema, or ear bulging  Nose: Nares bilaterally patent, no discharge  Mouth: No Stridor / Drooling / Trismus.  Mucosa moist, tongue/uvula midline, oropharynx clear  Neck: Flat, supple, no lymphadenopathy, trachea midline, no masses  Lymphatic: No lymphadenopathy  Resp: breathing easily, no stridor  CV: no peripheral edema/cyanosis  GI: nondistended   Peripheral vascular: no JVD or edema  Neuro: facial nerve intact, no facial droop        Diagnostic Nasal Endoscopy: (Scope #2 used)    Fiberoptic Indirect laryngoscopy:  (Scope #2 used)        IMAGING/ADDITIONAL STUDIES:  CC: Left facial swelling and erythema    HPI: 59 yo M with PMH of HTN, HLD, T2DM, presents here with SOB and hypoxia, fatigue and malaise.  He had no diarrhea.  Daughter-in-law checked his pulse ox at home which was 88%, which prompted her to bring him to hospital.  While in ED, patient felt lethargic and had a brief 20 sec period of unresponsiveness.  Patient denies losing consciousness; states he remembers hearing his daughter-in-law calling his name, but he did not respond because he was not feeling well. Pt was intubated and failed extubation and was reintubated. ENT was called to see pt for left facial swelling and erythema that was noted during proning. Pt has been having intermittent fevers and started on zosyn empirically. Pt is COVID+. No modifying factors.          PAST MEDICAL & SURGICAL HISTORY:  HTN (hypertension)    HLD (hyperlipidemia)    T2DM (type 2 diabetes mellitus)    No pertinent past surgical history      Allergies    No Known Allergies    Intolerances      MEDICATIONS  (STANDING):  chlorhexidine 0.12% Liquid 15 milliLiter(s) Oral Mucosa every 12 hours  chlorhexidine 4% Liquid 1 Application(s) Topical <User Schedule>  cholecalciferol 1000 Unit(s) Oral daily  cisatracurium Infusion 3 MICROgram(s)/kG/Min (17.2 mL/Hr) IV Continuous <Continuous>  fentaNYL   Infusion... 0.5 MICROgram(s)/kG/Hr (2.38 mL/Hr) IV Continuous <Continuous>  heparin  Infusion 1000 Unit(s)/Hr (10 mL/Hr) IV Continuous <Continuous>  insulin lispro (ADMELOG) corrective regimen sliding scale   SubCutaneous every 6 hours  ketamine Infusion. 0.25 mG/kG/Hr (2.38 mL/Hr) IV Continuous <Continuous>  meropenem  IVPB 1000 milliGRAM(s) IV Intermittent every 8 hours  midazolam Infusion 0.02 mG/kG/Hr (1.91 mL/Hr) IV Continuous <Continuous>  norepinephrine Infusion 0.05 MICROgram(s)/kG/Min (8.93 mL/Hr) IV Continuous <Continuous>  pantoprazole  Injectable 40 milliGRAM(s) IV Push daily  phenylephrine    Infusion 3 MICROgram(s)/kG/Min (53.6 mL/Hr) IV Continuous <Continuous>  polyethylene glycol 3350 17 Gram(s) Oral two times a day  propofol Infusion 10 MICROgram(s)/kG/Min (5.72 mL/Hr) IV Continuous <Continuous>  senna Syrup 10 milliLiter(s) Oral at bedtime  vancomycin  IVPB 1000 milliGRAM(s) IV Intermittent once  vasopressin Infusion 0.04 Unit(s)/Min (2.4 mL/Hr) IV Continuous <Continuous>    MEDICATIONS  (PRN):  acetaminophen   Tablet .. 650 milliGRAM(s) Oral every 6 hours PRN Temp greater or equal to 38C (100.4F), Mild Pain (1 - 3)  ALBUTerol    90 MICROgram(s) HFA Inhaler 1 Puff(s) Inhalation every 4 hours PRN Shortness of Breath      Social History: no tobacco use  Family history: no pertinent FHx  ROS:   ENT: all negative except as noted in HPI   CV: denies palpitations  Pulm: denies SOB, cough, hemoptysis  GI: denies change in apetite, indigestion, n/v  : denies pertinent urinary symptoms, urgency  Neuro: denies numbness/tingling, loss of sensation  Psych: denies anxiety  MS: denies muscle weakness, instability  Heme: denies easy bruising or bleeding  Endo: denies heat/cold intolerance, excessive sweating  Vascular: denies LE edema    Vital Signs Last 24 Hrs  T(C): 36.3 (18 May 2021 14:00), Max: 39.5 (18 May 2021 00:00)  T(F): 97.3 (18 May 2021 14:00), Max: 103.1 (18 May 2021 00:00)  HR: 61 (18 May 2021 15:23) (51 - 176)  BP: 146/74 (18 May 2021 10:45) (146/74 - 146/74)  BP(mean): 103 (18 May 2021 10:45) (103 - 103)  RR: 26 (18 May 2021 14:45) (0 - 27)  SpO2: 94% (18 May 2021 15:23) (82% - 98%)                          14.7   18.14 )-----------( 321      ( 18 May 2021 00:17 )             46.7    05-18    135  |  99  |  62<H>  ----------------------------<  210<H>  4.4   |  21<L>  |  1.13    Ca    8.7      18 May 2021 00:17  Phos  4.5     05-18  Mg     2.5     05-18    TPro  6.9  /  Alb  2.7<L>  /  TBili  1.3<H>  /  DBili  x   /  AST  54<H>  /  ALT  89<H>  /  AlkPhos  144<H>  05-18   PTT - ( 18 May 2021 11:14 )  PTT:157.0 sec    PHYSICAL EXAM:  Gen: NAD  Skin: No rashes, bruises, or lesions  Head: Normocephalic, Atraumatic  Face: Left facial swelling and erythema, small pressure ulcer, no active bleeding, no collection  Eyes: no scleral injection  Nose: Nares bilaterally patent, no discharge  Mouth: ET tube in place, oral cavity not visualized since pt is in prone position, No Stridor   Neck: Flat, supple, no lymphadenopathy, trachea midline, no masses  Lymphatic: No lymphadenopathy  Resp: breathing easily, no stridor  CV: no peripheral edema/cyanosis  GI: nondistended   Peripheral vascular: no JVD or edema  Neuro: no facial droop

## 2021-05-18 NOTE — DIETITIAN NUTRITION RISK NOTIFICATION - ADDITIONAL COMMENTS/DIETITIAN RECOMMENDATIONS
1. Recommend modifying EN regimen to Vital High Protein with goal rate of 50mL/hr x 24hrs + 1 'No Carb Prosource TF'. At goal regimen to provide 1200mL formula, 1240kcals, 116g protein, 1003mL free H2O.   2. Recommend addition of Multivitamin supplementation daily; continue Vitamin D supplementation as ordered

## 2021-05-18 NOTE — CONSULT NOTE ADULT - ASSESSMENT
Impression:   - Acute hypoxemic respiratory failure secondary to COVID-19 associated pneumonia with severe ARDS, intubated on 05/11/21  - СЕРГЕЙ due to volume depletion, better   - Shock, likely medication induced, less likely septic shock   - Medical hx of HTN, DM and hyperlipidemia     Recs:  - Admit to ICU, cont with ventilator support, f/up psot intubation CXR, ABG, NGT is placed, keep SpO2 > 88%, current Pplat of 32 cmH2O, keep Pplat < 33-35 (obese patient), will taper down FiO2 if possible.   - cont with Decadron 6 mg daily for 10 days, no clear evidence of superimposed bacterial infection, cont observation   - Proning if feasible with minimum 12 hours daily, cont sedation with BIS score of 40-60 and Nimbex with target ToF 2/4.   - cont with DVT ppx, GI ppx, HoB elevation, advance diet after NGT is confirmed, bowel regimen, overall prognosis is guarded, full code. 
59 yo M with with significant PMHx admitted for SOB and hypoxia, currently intubated, found to have mild left facial swelling during proning likely due to parotitis VS pressure ulcer. Oral cavity was not examined since pt was proned.

## 2021-05-18 NOTE — CHART NOTE - NSCHARTNOTEFT_GEN_A_CORE
Nutrition Follow Up Note  Patient seen for: Initial RD assessment on 5ICU    Chart reviewed, events noted. Per chart: 60M, PMH of HTN, HLD, T2DM. P/w SOB and hypoxia, found to have COVID-19 PNA, subsequently intubated  and transferred to 5ICU; extubated  & reintubated  in setting of increased WOB  - Sedated (fent, prop) / Paralyzed (nimbex); Pressors (levo, casey)  - Proned overnight, currently supinated as of 9:20am     Source: EMR, Team    -If unable to interview patient: [x] Trach/Vent/BiPAP  [] Disoriented/confused/inappropriate to interview    Diet, NPO with Tube Feed:   Tube Feeding Modality: Nasogastric  Vital High Protein (VITALHP)  Total Volume for 24 Hours (mL): 480  Continuous  Until Goal Tube Feed Rate (mL per Hour): 20  Tube Feed Duration (in Hours): 24  Tube Feed Start Time: 15:00  No Carb Prosource TF     Qty per Day:  2 (21 @ 14:24)    EN Order Provides: 480ml formula, 560kcals, 64g protein, 401ml free H2O   - Noted from - pt was ordered for Vital HP @ 40ml/hr x 24hrs + 2 No Carb Prosource TF (960mL formula, 1040kcals, 106g protein, 803mL free H2O)    Current Pump Rate: ___ml/hr  EN provision: ___% EN volume goal provided in past __ days     Is current diet order appropriate/adequate? [] Yes  [x]  No:     Nutrition-Related Events:  - Propofol ordered -  if infusion continues at current rate (28.2 mL/hr), will provide 745 kcals/day   - Insulin Sliding Scale to optimize BG; A1C 7.2% ()  - Receiving Vitamin D3  - EN initiated       GI:  Last BM .   Bowel Regimen? [x] Yes (miralax, senna)  [] No  - Noted pt on abx course at this time    Weights:   Daily Weight in k.3 (), 93.4 ()  - Noted weight fluctuations may be 2/2 fluid shifts as pt has received diuretic therapies in-house (last received lasix )    Drug Dosing Weight  Height (cm): 177.8 ()  Weight (kg): 95.3 (-)  BMI (kg/m2): 30.1 (-)  IBW: 166Ibs/75.2kg      MEDICATIONS  (STANDING):  cholecalciferol  insulin lispro (ADMELOG) corrective regimen sliding scale  norepinephrine Infusion  pantoprazole  Injectable  phenylephrine    Infusion  piperacillin/tazobactam IVPB..  polyethylene glycol 3350  senna Syrup      Pertinent Labs:    A1C with Estimated Average Glucose Result: 7.2 % (21 @ 08:55)    Finger Sticks:          Skin per nursing documentation:   Edema:     Estimated Nutritional Needs: with consideration for intubation  & BMI >30  Energy Needs (14-18 kcals/kg): 5026-4183 - Based on dosing wt 95.3kg  Protein Needs (1.4-1.8 g/kg): 105-135 - Based on IBW 75.2kg  [place] State Equation (REE): 2183kcals (23kcals/kg per dosing wt)    Previous Nutrition Diagnosis: No active nutrition diagnosis identified at this time  Nutrition Diagnosis is: [] ongoing  [] resolved [x] not applicable     New Nutrition Diagnosis: inadequate protein intake  Related to: inadequate protein provided by EN  As evidenced by current EN providing ~50% protein needs    Nutrition Care Plan:  [x] In Progress  [] Achieved  [] Not applicable      Recommendations:      1. In setting of significant kcals provided by Propofol, recommend modifying EN regimen to initiate Vital High Protein at trickle rate and as medically feasible/tolerated advance to goal rate of 40mL/hr x 24hrs + 2 'No Carb Prosource TF'. At goal regimen to provide 960mL formula, 1040kcals, 106g protein, 803mL free H2O. Propofol infusions at current rate to provide an additional 745kcals/day. (whole regimen meets 18.7kcals/kg based on dosing wt 95.3kg & 1.4g protein/kg based on IBW 75.2kg).   2. Recommend addition of Multivitamin supplementation daily; continue Vitamin D supplementation as ordered  3. Adjust insulin regimen PRN to maintain BG <180  4. Monitor GI tolerance and BM's; last documented BM   5. RD to remain available to adjust EN formulary, volume/rate PRN.     Monitoring and Evaluation:   Continue to monitor nutritional intake, tolerance to diet prescription, weights, labs, skin integrity  RD remains available upon request and will follow up per protocol    Peyton Kc MS, RD, CDN, [facility]  pager #[phone] Nutrition Follow Up Note  Patient seen for: Initial RD assessment on 5ICU    Chart reviewed, events noted. Per chart: 60M, PMH of HTN, HLD, T2DM. P/w SOB and hypoxia, found to have COVID-19 PNA, subsequently intubated  and transferred to 5ICU; extubated  & reintubated  in setting of increased WOB  - Sedated (fent, prop) / Paralyzed (nimbex); Pressors (levo, casey)  - Proned overnight, currently supinated as of 9:20am     Source: EMR, Team    -If unable to interview patient: [x] Trach/Vent/BiPAP  [] Disoriented/confused/inappropriate to interview    Diet, NPO with Tube Feed:   Tube Feeding Modality: Nasogastric  Vital High Protein (VITALHP)  Total Volume for 24 Hours (mL): 480  Continuous  Until Goal Tube Feed Rate (mL per Hour): 20  Tube Feed Duration (in Hours): 24  Tube Feed Start Time: 15:00  No Carb Prosource TF     Qty per Day:  2 (21 @ 14:24)    EN Order Provides: 480ml formula, 560kcals, 64g protein, 401ml free H2O   - Noted from - pt was ordered for Vital HP @ 40ml/hr x 24hrs + 2 No Carb Prosource TF (960mL formula, 1040kcals, 106g protein, 803mL free H2O)    Current Pump Rate: 20ml/hr  EN provision: <50% EN volume goal provided in past 5days      () 280ml - noted held 2/2 increased O2 requirements     () 320ml - EN held for nocturnal Bipap     (5/15) 20ml - help on Bipap     () 0ml - NPO on Bipap     () 425ml     Is current diet order appropriate/adequate? [] Yes  [x]  No: with propofol infusions regimen still provides less than low end of energy needs & 60% of low end protein needs  - Previous regimen (Vital HP @ 40ml/hr x 24hrs + 2 No Carb Prosource TF) met 100% energy and protein needs with propofol infusions    Nutrition-Related Events:  - Propofol ordered -  if infusion continues at current rate (22.3 mL/hr), will provide ~590 kcals/day   - Insulin Sliding Scale to optimize BG; A1C 7.2% (/)  - Receiving Vitamin D3  - EN initiated       GI:  Last BM .   Bowel Regimen? [x] Yes (miralax, senna)  [] No  - Noted pt on abx course at this time      Daily Weight in k (-17), 86.3 (-), 96.3 (-), 93.4 ()  -  7.6% wt loss noted x 2weeks (clinically significant); Noted weight fluctuations likely multifactorial 2/2 fluid shifts as pt has received diuretic therapies in-house along with inadequate EN infusion    Drug Dosing Weight  Height (cm): 177.8 ()  Weight (kg): 95.3 ()  BMI (kg/m2): 30.1 ()  IBW: 166Ibs/75.2kg      MEDICATIONS  (STANDING):  cholecalciferol  insulin lispro (ADMELOG) corrective regimen sliding scale  norepinephrine Infusion  pantoprazole  Injectable  phenylephrine    Infusion  piperacillin/tazobactam IVPB..  polyethylene glycol 3350  senna Syrup      Pertinent Labs: MEDICATIONS  (STANDING):  cholecalciferol  insulin lispro (ADMELOG) corrective regimen sliding scale  norepinephrine Infusion  pantoprazole  Injectable  phenylephrine    Infusion  piperacillin/tazobactam IVPB..  polyethylene glycol 3350  senna Syrup    A1C with Estimated Average Glucose Result: 7.2 % (21 @ 08:55)    Finger Sticks:   POCT Blood Glucose.: 201 mg/dL (18 May 2021 05:20)  POCT Blood Glucose.: 168 mg/dL (17 May 2021 23:10)  POCT Blood Glucose.: 149 mg/dL (17 May 2021 17:01)  POCT Blood Glucose.: 198 mg/dL (17 May 2021 12:01)      Skin per nursing documentation: no pressure injuries noted  Edema: none noted    Estimated Nutritional Needs: with consideration for reintubation & BMI >30  Energy Needs (14-18 kcals/kg): 9716-4806 - Based on dosing wt 95.3kg  Protein Needs (1.4-1.8 g/kg): 105-135 - Based on IBW 75.2kg  [place] State Equation (REE): 2185kcals (23kcals/kg per dosing wt)    Previous Nutrition Diagnosis: inadequate protein intake  Nutrition Diagnosis is: advanced    New Nutrition Diagnosis: Severe Malnutrition in setting of Acute Illness  Related to: inability to provide adequate protein-calorie needs in setting of complicated hospital course 2/2 COVID-19   As evidenced by: meeting <50% nutritional needs >5 days, 7.6% wt loss noted x 2weeks    Nutrition Care Plan:  [x] In Progress  [] Achieved  [] Not applicable      Recommendations:      1. Recommend modifying EN regimen to Vital High Protein with goal rate of 50mL/hr x 24hrs + 1 'No Carb Prosource TF'. At goal regimen to provide 1200mL formula, 1240kcals, 116g protein, 1003mL free H2O. Propofol infusions at current rate to provide an additional 590kcals/day. (whole regimen meets 19kcals/kg based on dosing wt 95.3kg & 1.5g protein/kg based on IBW 75.2kg).   - While Pt prone provide trickle feeds at rate between 10-20ml/hr as tolerate & keep HOB (reverse Trendelenburg) at 10-25 degrees to decrease aspiration risk.   2. Recommend addition of Multivitamin supplementation daily; continue Vitamin D supplementation as ordered  3. Adjust insulin regimen PRN to maintain BG <180  4. RD to remain available to adjust EN formulary, volume/rate PRN.   5. New malnutrition alert placed - Will follow-up according to protocol.     Monitoring and Evaluation:   Continue to monitor nutritional intake, tolerance to diet prescription, weights, labs, skin integrity  RD remains available upon request and will follow up per protocol    Peyton Kc, MS, RD, CDN, CNSC  pager #[phone]

## 2021-05-18 NOTE — PROGRESS NOTE ADULT - ASSESSMENT
This is a 61 yo M with medical history of HTN, DM and hyperlipidemia who was admitted initially for shortness of breath for few days and hypoxemia of 88% on RA, patient was found to have COVID-19 associated pneumonia, he was started on supplemental O2 along with Decadron and Remdisivir. Patient's condition gradually deteriorated to the point he required HFNC then BPAP,  pt was intubated 5/11 as his SpO2 went down to low 80s% despite all non-invasive measures. In the ICU, patient was placed on V-AC mode with settings of 32/450/14/100, PEEP was up titrated gradually. He remained asynchronous with the ventilator despite adequate sedation, for which Nimbex drip was initiated. Also, his BP went down after high dose propofol, RIJ TLC with A line were inserted.Patient remained intubated for 2 days and was extubated to Bipap 5/13 and was able to tolerate HFNC intermittently. Afetr 3 days of extubatrion, patiennt work of breathing increased and became gradually more hypoxic, reuqiring re-intubation on 5/17.    Neuro:   - Sedated with fentanyl and propofol  - Paralyzed on Nimbex as he is now intubated  - Follows commands to all extremities at baseline    CV:   - Continue Levophed to maintain MAP >65   - Episodes of chest pain (pleurtic?) while pt was extrubated; suspected of non-cardiologic  etiology as EKG without changes, Troponin remains at 7, and it was resolved with Tylenol/toradol    Resp:   AHRF  - Extubated (5/13)  - Reintubated (5/17) 2/2 increased work of breathing, cxr verified ETT 25 at lip and in position  - ABG post intubation shows PF ratio of 76. Pt  proned at 215pm to be supinated at 915 am on 5/18  - Volume A/C 450/26/5/90%; ABG:   - Pneumomediastinum (5/13) resolving    GI:   - Trickle TF for now-20ccVital HP   - BM 5/16  - Continue Protonix 40 daily     Renal:   - Keep net even.   - Boss Reinserted for critical care monitoring ( U/O 30-40cc/hr)     Heme:   - Heparin gtt, f/u ptt in 6 hrs after dose   - H+H stable     ID:   Empiric 5/17 Fevers---101.1 bld cx, spt cx  - UA 5/17 neg   - Zosyn empirically (5/12 - 18).   - BCx 5/12 NGTD.   COVID  - S/p Dex and remdesivir 5/3-5/7     Endo:   - Cover with [facility]  - Keep FS<180    Family updated d/w Akash

## 2021-05-18 NOTE — CHART NOTE - NSCHARTNOTEFT_GEN_A_CORE
: Cristian Martin    INDICATION: Shock and Hypoxemic Respiratory Failure    PROCEDURE:  [ ] LIMITED ECHO  [x ] LIMITED CHEST  [ ] LIMITED RETROPERITONEAL  [ ] LIMITED ABDOMINAL    FINDINGS:  Lungs: Bilateral B-lines with irregular pleura, Lung Sliding Bilaterally    INTERPRETATION:  No evidence of pneumothorax  Alveolar interstitial pattern consistent with COVID-19 pneumonia    Images stored to [employer]

## 2021-05-18 NOTE — DIETITIAN NUTRITION RISK NOTIFICATION - TREATMENT: THE FOLLOWING DIET HAS BEEN RECOMMENDED
Diet, NPO with Tube Feed:   Tube Feeding Modality: Nasogastric  Vital High Protein (VITALHP)  Total Volume for 24 Hours (mL): 480  Continuous  Until Goal Tube Feed Rate (mL per Hour): 20  Tube Feed Duration (in Hours): 24  Tube Feed Start Time: 15:00  No Carb Prosource TF     Qty per Day:  2 (05-17-21 @ 14:24) [Active]

## 2021-05-18 NOTE — CHART NOTE - NSCHARTNOTEFT_GEN_A_CORE
Patient without a PMH of A.Fib suddenly became hypotensive with a MAP of 56 and went into A.Fib RVR with a HR of 200.  Patient received Lopressor 5mg IV 5 minutes apart from each other, repsonding with a decreased in HR to 130-150's. EKG was obtained reporting A. Fib RVR, patient was cardioverted and received Amiodarone 150mg IVPB with response. Vasporessors were changed from Levophed to Phenylephrine due to tachycardia. Patient without a PMH of A.Fib, became hypotensive with a MAP of 56 and went into A.Fib RVR with a 's.  Patient received Lopressor 5mg IV x3 five minutes apart from each other with a decrease in HR to 130-150's. EKG was obtained reporting A. Fib RVR, patient was cardioverted with Amiodarone 150mg IVPB x2 and was started on an Amiodarone gtt. Levophed was changed to Phenylephrine due to tachycardia. Patient responded well to treatment with a current HR <120. Mr. Oswald went into to A.Fib RVR with resultant hypotension. He received Lopressor 5mg q5 mins x3 and his decrease in HR to 130-150's and MAPs significantly improved. He has no history of Afib and although his heart rates improved he was subsequently given 150mg of amiodarone x2 and started on an Amiodarone gtt. Levophed was titrated off and he was started on Phenylephrine and vasopressin to maintain MAP goal of 65 along with a heparin gtt.

## 2021-05-18 NOTE — CHART NOTE - NSCHARTNOTEFT_GEN_A_CORE
Patient is a 60M DM2, HTN, and HLD who presented with COVID-19 pneumonia. He had progressive hypoxemic respiratory failure until he required intubation on 5/11. He was extubated 5/13 but then required reintubation on 5/17 due to worsening respiratory status. He was proned this afternoon.    We were called to bedside due to elevated peak pressures and difficulty ventilating. Patient is on mechanical ventilatory support currently with PEEP 10 and FiO2 100%. He has an elevation of his peak pressures to 50 which was causing a reduction in tidal volumes.     Lung US - without evidence of pneumothorax  Exam - coarse breath sounds bilaterally with bilateral chest excursion  Patient is currently prone  Will need post proning ABG  Will continue airway clearance    1. Acute Hypoxemic Respiratory Failure due to COVID-19 ARDS. Continue mechanical ventilation and utilize airway clearance. Bronchodilator therapy. Maintain O2 sat > 90% as able. Patient currently sedated, paralyzed, and proned.   - High peak pressures possibly related to mucus plugging. No kinks in tubing noted. No evidence of pneumothorax.  2. Shock state - continue vasopressors to maintain MAP > 65. Continue antibiotics in case of shock. There is likely a component of vasoplegia related to his sedative medications. Monitor volume status given septal flattening noted on prior echo.  3. Dysphagia - on tube feeds. Monitor BM  4. Neurologic - patient sedated and paralyzed. Currently off Propofol due to bradycardia that was noted. Previously had Afib with RVR treated with Amio and then secondary bradycardia. After cessation of Amio patient continued to have some noted bradycardia to 50s  thought possibly related to Propofol but unclear.      Patient is critically ill requiring frequent bedside visits with therapy change. I have provided 50 minutes of noncontinuous critical care time between 4:00-11:59 PM. Patient is a 60M DM2, HTN, and HLD who presented with COVID-19 pneumonia. He had progressive hypoxemic respiratory failure until he required intubation on 5/11. He was extubated 5/13 but then required reintubation on 5/17 due to worsening respiratory status. He was proned this afternoon.    We were called to bedside due to elevated peak pressures and difficulty ventilating. Patient is on mechanical ventilatory support currently with PEEP 10 and FiO2 100%. He has an elevation of his peak pressures to 50 which was causing a reduction in tidal volumes.     Lung US - without evidence of pneumothorax  Exam - coarse breath sounds bilaterally with bilateral chest excursion  Patient is currently prone  Will need post proning ABG  Will continue airway clearance    1. Acute Hypoxemic Respiratory Failure due to COVID-19 ARDS. Continue mechanical ventilation and utilize airway clearance. Bronchodilator therapy. Maintain O2 sat > 90% as able. Patient currently sedated, paralyzed, and proned.   - High peak pressures possibly related to mucus plugging. No kinks in tubing noted. No evidence of pneumothorax. Will increase airway clearance and use IPV as able.  2. Shock state - continue vasopressors to maintain MAP > 65. Continue antibiotics in case of shock. There is likely a component of vasoplegia related to his sedative medications. Monitor volume status given septal flattening noted on prior echo.  3. Dysphagia - on tube feeds. Monitor BM  4. Neurologic - patient sedated and paralyzed. Currently off Propofol due to bradycardia that was noted. Previously had Afib with RVR treated with Amio and then secondary bradycardia. After cessation of Amio patient continued to have some noted bradycardia to 50s  thought possibly related to Propofol but unclear.      Patient is critically ill requiring frequent bedside visits with therapy change. I have provided 50 minutes of noncontinuous critical care time between 4:00-11:59 PM.

## 2021-05-18 NOTE — PROGRESS NOTE ADULT - ATTENDING COMMENTS
60 M with history above here with acute hypoxemic respiratory failure / ARDS secondary to COVID-19 pneumonia. Course c/b severe sepsis with septic shock requiring vasopressor support and new onset atrial fibrillation with rapid ventricular response. He has completed courses of dexamethasone and Remdesivir. He was initially intubated 5/11 - 5/13 and then re-intubated 5/17 for respiratory decompensation. He is sedated and on a paralytic agent for ventilator dyssynchrony. Patient was proned yesterday 5/17 and supined early this morning. Overnight had an episode of AF RVR in setting of fever. Required multiple pushes of metoprolol and amiodarone 150 IVP x 1 followed by continuous infusion. He converted to sinus bradycardia and amiodarone currently on hold. Norepinephrine was changed to phenylephrine and vasopressin given his tachycardia. He remains in shock. Will complete course of empiric Zosyn today. Follow up cultures. Continue sedatives, paralysis, and lung protective ventilation. Plateau pressure is 27 this morning during rounds although driving pressure is 22. Remainder of plan as detailed above. Prognosis guarded.

## 2021-05-18 NOTE — PROGRESS NOTE ADULT - SUBJECTIVE AND OBJECTIVE BOX
CHIEF COMPLAINT:  Patient is a 60y old  Male who presents with a chief complaint of shortness of breath (17 May 2021 10:21)    HPI:  59 yo M with PMH of HTN, HLD, T2DM, presents here with SOB and hypoxia.  Patient started feeling fatigue and malaise on Monday.  Over the last three days, he also started having decreased appetite, mild cough, SOB, feverish, chills.  He has been mostly in bed, not able to do his usual ADLs.  This morning he had an episode of vomiting.  He was also having some back pain, which is worse with walking.  He had no diarrhea.  Daughter-in-law checked his pulse ox at home which was 88%, which prompted her to bring him to hospital.  While in ED, patient felt lethargic and had a brief 20 sec period of unresponsiveness.  Patient denies losing consciousness; states he remembers hearing his daughter-in-law calling his name, but he did not respond because he was not feeling well.  In ED, his vitals showed temp 98.5, HR 89, /70, RR 19-26, saturation 88% on RA.   (03 May 2021 03:11)      Interval Events:      REVIEW OF SYSTEMS:          OBJECTIVE:  ICU Vital Signs Last 24 Hrs  T(C): 38.8 (18 May 2021 04:00), Max: 39.5 (18 May 2021 00:00)  T(F): 101.8 (18 May 2021 04:00), Max: 103.1 (18 May 2021 00:00)  HR: 122 (18 May 2021 05:15) (73 - 176)  BP: --  BP(mean): --  ABP: 96/68 (18 May 2021 05:15) (72/44 - 195/97)  ABP(mean): 77 (18 May 2021 05:15) (56 - 137)  RR: 26 (18 May 2021 05:15) (0 - 45)  SpO2: 90% (18 May 2021 05:15) (79% - 98%)    Mode: AC/ CMV (Assist Control/ Continuous Mandatory Ventilation), RR (machine): 26, TV (machine): 450, FiO2: 100, PEEP: 5, ITime: 1, MAP: 13, PIP: 32    05-16 @ 07:01  -  05-17 @ 07:00  --------------------------------------------------------  IN: 1475 mL / OUT: 1125 mL / NET: 350 mL     @ 07:01  -   @ 06:17  --------------------------------------------------------  IN: 2495.9 mL / OUT: 860 mL / NET: 1635.9 mL      CAPILLARY BLOOD GLUCOSE      POCT Blood Glucose.: 201 mg/dL (18 May 2021 05:20)          PHYSICAL EXAM:          HOSPITAL MEDICATIONS:  MEDICATIONS  (STANDING):  aMIOdarone Infusion 1 mG/Min (33.3 mL/Hr) IV Continuous <Continuous>  atorvastatin 20 milliGRAM(s) Oral at bedtime  chlorhexidine 0.12% Liquid 15 milliLiter(s) Oral Mucosa every 12 hours  chlorhexidine 4% Liquid 1 Application(s) Topical <User Schedule>  cholecalciferol 1000 Unit(s) Oral daily  cisatracurium Infusion 3 MICROgram(s)/kG/Min (17.2 mL/Hr) IV Continuous <Continuous>  fentaNYL   Infusion... 0.5 MICROgram(s)/kG/Hr (2.38 mL/Hr) IV Continuous <Continuous>  heparin  Infusion.  Unit(s)/Hr (17 mL/Hr) IV Continuous <Continuous>  insulin lispro (ADMELOG) corrective regimen sliding scale   SubCutaneous every 6 hours  norepinephrine Infusion 0.05 MICROgram(s)/kG/Min (8.93 mL/Hr) IV Continuous <Continuous>  pantoprazole  Injectable 40 milliGRAM(s) IV Push daily  phenylephrine    Infusion 3 MICROgram(s)/kG/Min (53.6 mL/Hr) IV Continuous <Continuous>  piperacillin/tazobactam IVPB.. 3.375 Gram(s) IV Intermittent every 8 hours  polyethylene glycol 3350 17 Gram(s) Oral two times a day  propofol Infusion 10 MICROgram(s)/kG/Min (5.72 mL/Hr) IV Continuous <Continuous>  senna Syrup 10 milliLiter(s) Oral at bedtime    MEDICATIONS  (PRN):  acetaminophen   Tablet .. 650 milliGRAM(s) Oral every 6 hours PRN Temp greater or equal to 38C (100.4F), Mild Pain (1 - 3)  ALBUTerol    90 MICROgram(s) HFA Inhaler 1 Puff(s) Inhalation every 4 hours PRN Shortness of Breath  heparin   Injectable 7500 Unit(s) IV Push every 6 hours PRN For aPTT less than 40  heparin   Injectable 3500 Unit(s) IV Push every 6 hours PRN For aPTT between 40 - 57      LABS:                        14.7   18.14 )-----------( 321      ( 18 May 2021 00:17 )             46.7     Hgb Trend: 14.7<--, 13.8<--, 15.9<--, 14.3<--, 13.5<--  05-18    135  |  99  |  62<H>  ----------------------------<  210<H>  4.4   |  21<L>  |  1.13    Ca    8.7      18 May 2021 00:17  Phos  4.5     05-18  Mg     2.5     05-18    TPro  6.9  /  Alb  2.7<L>  /  TBili  1.3<H>  /  DBili  x   /  AST  54<H>  /  ALT  89<H>  /  AlkPhos  144<H>  05-18    LIVER FUNCTIONS - ( 18 May 2021 00:17 )  Alb: 2.7 g/dL / Pro: 6.9 g/dL / ALK PHOS: 144 U/L / ALT: 89 U/L / AST: 54 U/L / GGT: x           Creatinine Trend: 1.13<--, 0.80<--, 0.71<--, 0.78<--, 0.65<--, 0.72<--  PTT - ( 18 May 2021 04:13 )  PTT:32.4 sec  Urinalysis Basic - ( 17 May 2021 18:21 )    Color: Yellow / Appearance: Slightly Turbid / S.042 / pH: x  Gluc: x / Ketone: Negative  / Bili: Negative / Urobili: Negative   Blood: x / Protein: 30 mg/dL / Nitrite: Negative   Leuk Esterase: Negative / RBC: 80 /hpf / WBC 7 /HPF   Sq Epi: x / Non Sq Epi: 3 /hpf / Bacteria: Negative      Arterial Blood Gas:   @ 00:15  7.34/50/68/26/91/.3  ABG lactate: --  Arterial Blood Gas:   @ 19:50  7.37/49/66/28/90/2.3  ABG lactate: --  Arterial Blood Gas:   @ 15:41  7.41/45/75/28/94/3.4  ABG lactate: --  Arterial Blood Gas:   @ 12:55  7.39/47/76/28/93/2.2  ABG lactate: --  Arterial Blood Gas:   @ 07:26  7.46/43/69/30/92/6.2  ABG lactate: --  Arterial Blood Gas:   @ 00:54  7.45/47/72/32/94/6.9  ABG lactate: --        MICROBIOLOGY:     RADIOLOGY:  [ ] Reviewed and interpreted by me    EKG:

## 2021-05-19 LAB
ALBUMIN SERPL ELPH-MCNC: 2.3 G/DL — LOW (ref 3.3–5)
ALP SERPL-CCNC: 134 U/L — HIGH (ref 40–120)
ALT FLD-CCNC: 75 U/L — HIGH (ref 10–45)
ANION GAP SERPL CALC-SCNC: 11 MMOL/L — SIGNIFICANT CHANGE UP (ref 5–17)
APTT BLD: 58.3 SEC — HIGH (ref 27.5–35.5)
APTT BLD: 58.9 SEC — HIGH (ref 27.5–35.5)
APTT BLD: 60 SEC — HIGH (ref 27.5–35.5)
AST SERPL-CCNC: 51 U/L — HIGH (ref 10–40)
BILIRUB SERPL-MCNC: 0.7 MG/DL — SIGNIFICANT CHANGE UP (ref 0.2–1.2)
BUN SERPL-MCNC: 42 MG/DL — HIGH (ref 7–23)
CALCIUM SERPL-MCNC: 8 MG/DL — LOW (ref 8.4–10.5)
CHLORIDE SERPL-SCNC: 102 MMOL/L — SIGNIFICANT CHANGE UP (ref 96–108)
CO2 SERPL-SCNC: 23 MMOL/L — SIGNIFICANT CHANGE UP (ref 22–31)
CREAT SERPL-MCNC: 0.54 MG/DL — SIGNIFICANT CHANGE UP (ref 0.5–1.3)
D DIMER BLD IA.RAPID-MCNC: 1808 NG/ML DDU — HIGH
FERRITIN SERPL-MCNC: 3021 NG/ML — HIGH (ref 30–400)
GAS PNL BLDA: SIGNIFICANT CHANGE UP
GLUCOSE BLDC GLUCOMTR-MCNC: 142 MG/DL — HIGH (ref 70–99)
GLUCOSE BLDC GLUCOMTR-MCNC: 149 MG/DL — HIGH (ref 70–99)
GLUCOSE BLDC GLUCOMTR-MCNC: 160 MG/DL — HIGH (ref 70–99)
GLUCOSE BLDC GLUCOMTR-MCNC: 182 MG/DL — HIGH (ref 70–99)
GLUCOSE SERPL-MCNC: 163 MG/DL — HIGH (ref 70–99)
HCT VFR BLD CALC: 40.5 % — SIGNIFICANT CHANGE UP (ref 39–50)
HGB BLD-MCNC: 12.8 G/DL — LOW (ref 13–17)
INR BLD: 1.11 RATIO — SIGNIFICANT CHANGE UP (ref 0.88–1.16)
INR BLD: 1.15 RATIO — SIGNIFICANT CHANGE UP (ref 0.88–1.16)
MAGNESIUM SERPL-MCNC: 2.3 MG/DL — SIGNIFICANT CHANGE UP (ref 1.6–2.6)
MCHC RBC-ENTMCNC: 29.2 PG — SIGNIFICANT CHANGE UP (ref 27–34)
MCHC RBC-ENTMCNC: 31.6 GM/DL — LOW (ref 32–36)
MCV RBC AUTO: 92.3 FL — SIGNIFICANT CHANGE UP (ref 80–100)
NRBC # BLD: 0 /100 WBCS — SIGNIFICANT CHANGE UP (ref 0–0)
PHOSPHATE SERPL-MCNC: 2.1 MG/DL — LOW (ref 2.5–4.5)
PLATELET # BLD AUTO: 289 K/UL — SIGNIFICANT CHANGE UP (ref 150–400)
POTASSIUM SERPL-MCNC: 3.8 MMOL/L — SIGNIFICANT CHANGE UP (ref 3.5–5.3)
POTASSIUM SERPL-SCNC: 3.8 MMOL/L — SIGNIFICANT CHANGE UP (ref 3.5–5.3)
PROCALCITONIN SERPL-MCNC: 0.66 NG/ML — HIGH (ref 0.02–0.1)
PROT SERPL-MCNC: 5.8 G/DL — LOW (ref 6–8.3)
PROTHROM AB SERPL-ACNC: 13.2 SEC — SIGNIFICANT CHANGE UP (ref 10.6–13.6)
PROTHROM AB SERPL-ACNC: 13.7 SEC — HIGH (ref 10.6–13.6)
RBC # BLD: 4.39 M/UL — SIGNIFICANT CHANGE UP (ref 4.2–5.8)
RBC # FLD: 12.5 % — SIGNIFICANT CHANGE UP (ref 10.3–14.5)
SODIUM SERPL-SCNC: 136 MMOL/L — SIGNIFICANT CHANGE UP (ref 135–145)
WBC # BLD: 23.53 K/UL — HIGH (ref 3.8–10.5)
WBC # FLD AUTO: 23.53 K/UL — HIGH (ref 3.8–10.5)

## 2021-05-19 PROCEDURE — 99232 SBSQ HOSP IP/OBS MODERATE 35: CPT

## 2021-05-19 PROCEDURE — 99291 CRITICAL CARE FIRST HOUR: CPT

## 2021-05-19 PROCEDURE — 76604 US EXAM CHEST: CPT | Mod: 26

## 2021-05-19 RX ORDER — ALBUTEROL 90 UG/1
1 AEROSOL, METERED ORAL EVERY 4 HOURS
Refills: 0 | Status: COMPLETED | OUTPATIENT
Start: 2021-05-19 | End: 2022-04-17

## 2021-05-19 RX ORDER — FUROSEMIDE 40 MG
40 TABLET ORAL ONCE
Refills: 0 | Status: COMPLETED | OUTPATIENT
Start: 2021-05-19 | End: 2021-05-19

## 2021-05-19 RX ORDER — POTASSIUM PHOSPHATE, MONOBASIC POTASSIUM PHOSPHATE, DIBASIC 236; 224 MG/ML; MG/ML
15 INJECTION, SOLUTION INTRAVENOUS ONCE
Refills: 0 | Status: COMPLETED | OUTPATIENT
Start: 2021-05-19 | End: 2021-05-19

## 2021-05-19 RX ORDER — MULTIVIT-MIN/FERROUS GLUCONATE 9 MG/15 ML
30 LIQUID (ML) ORAL DAILY
Refills: 0 | Status: DISCONTINUED | OUTPATIENT
Start: 2021-05-19 | End: 2021-05-21

## 2021-05-19 RX ORDER — ALBUTEROL 90 UG/1
1 AEROSOL, METERED ORAL EVERY 4 HOURS
Refills: 0 | Status: DISCONTINUED | OUTPATIENT
Start: 2021-05-19 | End: 2021-05-20

## 2021-05-19 RX ADMIN — Medication 2: at 05:03

## 2021-05-19 RX ADMIN — KETAMINE HYDROCHLORIDE 2.38 MG/KG/HR: 100 INJECTION INTRAMUSCULAR; INTRAVENOUS at 09:18

## 2021-05-19 RX ADMIN — KETAMINE HYDROCHLORIDE 2.38 MG/KG/HR: 100 INJECTION INTRAMUSCULAR; INTRAVENOUS at 01:02

## 2021-05-19 RX ADMIN — POTASSIUM PHOSPHATE, MONOBASIC POTASSIUM PHOSPHATE, DIBASIC 62.5 MILLIMOLE(S): 236; 224 INJECTION, SOLUTION INTRAVENOUS at 05:10

## 2021-05-19 RX ADMIN — Medication 1000 UNIT(S): at 11:20

## 2021-05-19 RX ADMIN — CHLORHEXIDINE GLUCONATE 15 MILLILITER(S): 213 SOLUTION TOPICAL at 05:05

## 2021-05-19 RX ADMIN — PHENYLEPHRINE HYDROCHLORIDE 53.6 MICROGRAM(S)/KG/MIN: 10 INJECTION INTRAVENOUS at 01:02

## 2021-05-19 RX ADMIN — PANTOPRAZOLE SODIUM 40 MILLIGRAM(S): 20 TABLET, DELAYED RELEASE ORAL at 11:21

## 2021-05-19 RX ADMIN — ALBUTEROL 1 PUFF(S): 90 AEROSOL, METERED ORAL at 09:30

## 2021-05-19 RX ADMIN — KETAMINE HYDROCHLORIDE 2.38 MG/KG/HR: 100 INJECTION INTRAMUSCULAR; INTRAVENOUS at 13:40

## 2021-05-19 RX ADMIN — Medication 40 MILLIGRAM(S): at 18:50

## 2021-05-19 RX ADMIN — ALBUTEROL 1 PUFF(S): 90 AEROSOL, METERED ORAL at 17:53

## 2021-05-19 RX ADMIN — ALBUTEROL 1 PUFF(S): 90 AEROSOL, METERED ORAL at 14:57

## 2021-05-19 RX ADMIN — MEROPENEM 100 MILLIGRAM(S): 1 INJECTION INTRAVENOUS at 21:07

## 2021-05-19 RX ADMIN — FENTANYL CITRATE 2.38 MICROGRAM(S)/KG/HR: 50 INJECTION INTRAVENOUS at 16:52

## 2021-05-19 RX ADMIN — Medication 30 MILLILITER(S): at 11:20

## 2021-05-19 RX ADMIN — HEPARIN SODIUM 11 UNIT(S)/HR: 5000 INJECTION INTRAVENOUS; SUBCUTANEOUS at 03:08

## 2021-05-19 RX ADMIN — Medication 2: at 23:19

## 2021-05-19 RX ADMIN — Medication 40 MILLIGRAM(S): at 09:18

## 2021-05-19 RX ADMIN — MIDAZOLAM HYDROCHLORIDE 1.91 MG/KG/HR: 1 INJECTION, SOLUTION INTRAMUSCULAR; INTRAVENOUS at 01:02

## 2021-05-19 RX ADMIN — VASOPRESSIN 2.4 UNIT(S)/MIN: 20 INJECTION INTRAVENOUS at 01:02

## 2021-05-19 RX ADMIN — CHLORHEXIDINE GLUCONATE 15 MILLILITER(S): 213 SOLUTION TOPICAL at 17:00

## 2021-05-19 RX ADMIN — MEROPENEM 100 MILLIGRAM(S): 1 INJECTION INTRAVENOUS at 05:04

## 2021-05-19 RX ADMIN — Medication 3 MILLILITER(S): at 11:44

## 2021-05-19 RX ADMIN — ALBUTEROL 1 PUFF(S): 90 AEROSOL, METERED ORAL at 23:16

## 2021-05-19 RX ADMIN — Medication 0: at 17:01

## 2021-05-19 RX ADMIN — MIDAZOLAM HYDROCHLORIDE 1.91 MG/KG/HR: 1 INJECTION, SOLUTION INTRAMUSCULAR; INTRAVENOUS at 16:51

## 2021-05-19 RX ADMIN — SENNA PLUS 10 MILLILITER(S): 8.6 TABLET ORAL at 21:07

## 2021-05-19 RX ADMIN — MIDAZOLAM HYDROCHLORIDE 1.91 MG/KG/HR: 1 INJECTION, SOLUTION INTRAMUSCULAR; INTRAVENOUS at 09:18

## 2021-05-19 RX ADMIN — CISATRACURIUM BESYLATE 17.2 MICROGRAM(S)/KG/MIN: 2 INJECTION INTRAVENOUS at 11:20

## 2021-05-19 RX ADMIN — HEPARIN SODIUM 12 UNIT(S)/HR: 5000 INJECTION INTRAVENOUS; SUBCUTANEOUS at 16:52

## 2021-05-19 RX ADMIN — POLYETHYLENE GLYCOL 3350 17 GRAM(S): 17 POWDER, FOR SOLUTION ORAL at 17:00

## 2021-05-19 RX ADMIN — CHLORHEXIDINE GLUCONATE 1 APPLICATION(S): 213 SOLUTION TOPICAL at 06:41

## 2021-05-19 RX ADMIN — MEROPENEM 100 MILLIGRAM(S): 1 INJECTION INTRAVENOUS at 13:40

## 2021-05-19 RX ADMIN — Medication 2: at 11:20

## 2021-05-19 RX ADMIN — CISATRACURIUM BESYLATE 17.2 MICROGRAM(S)/KG/MIN: 2 INJECTION INTRAVENOUS at 01:01

## 2021-05-19 RX ADMIN — KETAMINE HYDROCHLORIDE 2.38 MG/KG/HR: 100 INJECTION INTRAMUSCULAR; INTRAVENOUS at 16:51

## 2021-05-19 NOTE — PROGRESS NOTE ADULT - SUBJECTIVE AND OBJECTIVE BOX
CHIEF COMPLAINT:  Patient is a 60y old  Male who presents with a chief complaint of shortness of breath (18 May 2021 15:55)    HPI:  59 yo M with PMH of HTN, HLD, T2DM, presents here with SOB and hypoxia.  Patient started feeling fatigue and malaise on Monday.  Over the last three days, he also started having decreased appetite, mild cough, SOB, feverish, chills.  He has been mostly in bed, not able to do his usual ADLs.  This morning he had an episode of vomiting.  He was also having some back pain, which is worse with walking.  He had no diarrhea.  Daughter-in-law checked his pulse ox at home which was 88%, which prompted her to bring him to hospital.  While in ED, patient felt lethargic and had a brief 20 sec period of unresponsiveness.  Patient denies losing consciousness; states he remembers hearing his daughter-in-law calling his name, but he did not respond because he was not feeling well.  In ED, his vitals showed temp 98.5, HR 89, /70, RR 19-26, saturation 88% on RA.   (03 May 2021 03:11)      Interval Events: Heparin gtt adjusted. Otherwise no acute issues overnight      REVIEW OF SYSTEMS: Unable as pt is sedated          OBJECTIVE:  ICU Vital Signs Last 24 Hrs  T(C): 36.2 (19 May 2021 04:00), Max: 38.1 (18 May 2021 07:15)  T(F): 97.2 (19 May 2021 04:00), Max: 100.6 (18 May 2021 07:15)  HR: 75 (19 May 2021 05:45) (51 - 113)  BP: 146/74 (18 May 2021 10:45) (146/74 - 146/74)  BP(mean): 103 (18 May 2021 10:45) (103 - 103)  ABP: 107/61 (19 May 2021 05:45) (78/51 - 183/80)  ABP(mean): 78 (19 May 2021 05:45) (60 - 116)  RR: 30 (19 May 2021 05:45) (15 - 30)  SpO2: 94% (19 May 2021 05:45) (90% - 99%)    Mode: AC/ CMV (Assist Control/ Continuous Mandatory Ventilation), RR (machine): 30, TV (machine): 400, FiO2: 100, PEEP: 8, ITime: 1, MAP: 15, PIP: 33     @ 07: @ 07:00  --------------------------------------------------------  IN: 3244.5 mL / OUT: 1155 mL / NET: 2089.5 mL     @ 07: @ 06:12  --------------------------------------------------------  IN: 4557.8 mL / OUT: 1230 mL / NET: 3327.8 mL      CAPILLARY BLOOD GLUCOSE      POCT Blood Glucose.: 160 mg/dL (19 May 2021 04:47)          PHYSICAL EXAM:  GENERAL: NAD,  HEENT:  Atraumatic, Normocephalic  EYES: PERRLA, conjunctiva and sclera clear  NECK: Supple, No JVD  CHEST/LUNG: diminished bilaterally; No wheezes, rales, or rhonchi  HEART: Regular rate and rhythm; No murmurs, rubs, or gallops  ABDOMEN: Soft, Nontender, Nondistended; Bowel sounds present  EXTREMITIES:  2+ Peripheral Pulses, No clubbing, cyanosis, or edema  PSYCH: unable as pt is sedated  NEUROLOGY: sedated  SKIN: No rashes or lesions        HOSPITAL MEDICATIONS:  MEDICATIONS  (STANDING):  ALBUTerol    90 MICROgram(s) HFA Inhaler 1 Puff(s) Inhalation every 4 hours  albuterol/ipratropium for Nebulization 3 milliLiter(s) Nebulizer every 6 hours  chlorhexidine 0.12% Liquid 15 milliLiter(s) Oral Mucosa every 12 hours  chlorhexidine 4% Liquid 1 Application(s) Topical <User Schedule>  cholecalciferol 1000 Unit(s) Oral daily  cisatracurium Infusion 3 MICROgram(s)/kG/Min (17.2 mL/Hr) IV Continuous <Continuous>  fentaNYL   Infusion... 0.5 MICROgram(s)/kG/Hr (2.38 mL/Hr) IV Continuous <Continuous>  heparin  Infusion 1000 Unit(s)/Hr (11 mL/Hr) IV Continuous <Continuous>  insulin lispro (ADMELOG) corrective regimen sliding scale   SubCutaneous every 6 hours  ketamine Infusion. 0.25 mG/kG/Hr (2.38 mL/Hr) IV Continuous <Continuous>  meropenem  IVPB 1000 milliGRAM(s) IV Intermittent every 8 hours  midazolam Infusion 0.02 mG/kG/Hr (1.91 mL/Hr) IV Continuous <Continuous>  norepinephrine Infusion 0.05 MICROgram(s)/kG/Min (8.93 mL/Hr) IV Continuous <Continuous>  pantoprazole  Injectable 40 milliGRAM(s) IV Push daily  phenylephrine    Infusion 3 MICROgram(s)/kG/Min (53.6 mL/Hr) IV Continuous <Continuous>  polyethylene glycol 3350 17 Gram(s) Oral two times a day  propofol Infusion 10 MICROgram(s)/kG/Min (5.72 mL/Hr) IV Continuous <Continuous>  senna Syrup 10 milliLiter(s) Oral at bedtime  vasopressin Infusion 0.04 Unit(s)/Min (2.4 mL/Hr) IV Continuous <Continuous>    MEDICATIONS  (PRN):  acetaminophen   Tablet .. 650 milliGRAM(s) Oral every 6 hours PRN Temp greater or equal to 38C (100.4F), Mild Pain (1 - 3)      LABS:                        12.8   23.53 )-----------( 289      ( 19 May 2021 00:42 )             40.5     Hgb Trend: 12.8<--, 14.7<--, 13.8<--, 15.9<--, 14.3<--      136  |  102  |  42<H>  ----------------------------<  163<H>  3.8   |  23  |  0.54    Ca    8.0<L>      19 May 2021 00:42  Phos  2.1       Mg     2.3         TPro  5.8<L>  /  Alb  2.3<L>  /  TBili  0.7  /  DBili  x   /  AST  51<H>  /  ALT  75<H>  /  AlkPhos  134<H>  05-19    LIVER FUNCTIONS - ( 19 May 2021 00:42 )  Alb: 2.3 g/dL / Pro: 5.8 g/dL / ALK PHOS: 134 U/L / ALT: 75 U/L / AST: 51 U/L / GGT: x           Creatinine Trend: 0.54<--, 1.13<--, 0.80<--, 0.71<--, 0.78<--, 0.65<--  PT/INR - ( 19 May 2021 00:42 )   PT: 13.2 sec;   INR: 1.11 ratio         PTT - ( 19 May 2021 00:42 )  PTT:58.9 sec  Urinalysis Basic - ( 17 May 2021 18:21 )    Color: Yellow / Appearance: Slightly Turbid / S.042 / pH: x  Gluc: x / Ketone: Negative  / Bili: Negative / Urobili: Negative   Blood: x / Protein: 30 mg/dL / Nitrite: Negative   Leuk Esterase: Negative / RBC: 80 /hpf / WBC 7 /HPF   Sq Epi: x / Non Sq Epi: 3 /hpf / Bacteria: Negative      Arterial Blood Gas:   @ 00:34  7.34/53/88/27/96/1.1  ABG lactate: --  Arterial Blood Gas:   @ 17:00  7.36/52/73/28/94/2.4  ABG lactate: --  Arterial Blood Gas:   @ 14:08  7.36/50/59/28/88/2.3  ABG lactate: --  Arterial Blood Gas:   @ 12:19  7.35/50/60/27/88/.8  ABG lactate: --  Arterial Blood Gas:   @ 11:14  7.37/48/58/27/88/1.2  ABG lactate: --  Arterial Blood Gas:   @ 00:15  7.34/50/68/26/91/.3  ABG lactate: --  Arterial Blood Gas:   @ 19:50  7.37/49/66/28/90/2.3  ABG lactate: --  Arterial Blood Gas:   @ 15:41  7.41/45/75/28/94/3.4  ABG lactate: --  Arterial Blood Gas:   @ 12:55  7.39/47/76/28/93/2.2  ABG lactate: --  Arterial Blood Gas:   @ 07:26  7.46/43/69/30/92/6.2  ABG lactate: --        MICROBIOLOGY:     RADIOLOGY:  [ ] Reviewed and interpreted by me    EKG:

## 2021-05-19 NOTE — CHART NOTE - NSCHARTNOTEFT_GEN_A_CORE
: Olaf Gutiérrez MD    INDICATION: Acute hypoxemic respiratory failure, COVID-19    PROCEDURE:  [x] LIMITED CHEST    FINDINGS:  Posterior lung fields with B-line predominant pattern  Pleural surface irregular  No pleural effusions  Unable to examine anterior lung fields due to prone positioning    INTERPRETATION:  Lung findings consistent with COVID-19 viral pneumonia  No pleural effusions or PTX identified    Images stored on [employer]

## 2021-05-19 NOTE — PROGRESS NOTE ADULT - SUBJECTIVE AND OBJECTIVE BOX
ENT ISSUE/POD:  Left facial swelling and erythema    HPI: 59 yo M with PMH of HTN, HLD, T2DM, presents here with SOB and hypoxia, fatigue and malaise.  He had no diarrhea.  Daughter-in-law checked his pulse ox at home which was 88%, which prompted her to bring him to hospital.  While in ED, patient felt lethargic and had a brief 20 sec period of unresponsiveness.  Patient denies losing consciousness; states he remembers hearing his daughter-in-law calling his name, but he did not respond because he was not feeling well. Pt was intubated, failed extubation and was reintubated. ENT was called to see pt for left facial swelling and erythema that were noted during proning. Pt has been having intermittent fevers and started on zosyn empirically. Pt is COVID+. No modifying factors.              PAST MEDICAL & SURGICAL HISTORY:  HTN (hypertension)    HLD (hyperlipidemia)    T2DM (type 2 diabetes mellitus)    No pertinent past surgical history      Allergies    No Known Allergies    Intolerances      MEDICATIONS  (STANDING):  ALBUTerol    90 MICROgram(s) HFA Inhaler 1 Puff(s) Inhalation every 4 hours  albuterol/ipratropium for Nebulization 3 milliLiter(s) Nebulizer every 6 hours  chlorhexidine 0.12% Liquid 15 milliLiter(s) Oral Mucosa every 12 hours  chlorhexidine 4% Liquid 1 Application(s) Topical <User Schedule>  cholecalciferol 1000 Unit(s) Oral daily  cisatracurium Infusion 3 MICROgram(s)/kG/Min (17.2 mL/Hr) IV Continuous <Continuous>  fentaNYL   Infusion... 0.5 MICROgram(s)/kG/Hr (2.38 mL/Hr) IV Continuous <Continuous>  heparin  Infusion 1000 Unit(s)/Hr (11 mL/Hr) IV Continuous <Continuous>  insulin lispro (ADMELOG) corrective regimen sliding scale   SubCutaneous every 6 hours  ketamine Infusion. 0.25 mG/kG/Hr (2.38 mL/Hr) IV Continuous <Continuous>  meropenem  IVPB 1000 milliGRAM(s) IV Intermittent every 8 hours  midazolam Infusion 0.02 mG/kG/Hr (1.91 mL/Hr) IV Continuous <Continuous>  multivitamin/minerals Oral Solution (WELLESSE) 30 milliLiter(s) Oral daily  norepinephrine Infusion 0.05 MICROgram(s)/kG/Min (8.93 mL/Hr) IV Continuous <Continuous>  pantoprazole  Injectable 40 milliGRAM(s) IV Push daily  phenylephrine    Infusion 3 MICROgram(s)/kG/Min (53.6 mL/Hr) IV Continuous <Continuous>  polyethylene glycol 3350 17 Gram(s) Oral two times a day  propofol Infusion 10 MICROgram(s)/kG/Min (5.72 mL/Hr) IV Continuous <Continuous>  senna Syrup 10 milliLiter(s) Oral at bedtime  vasopressin Infusion 0.04 Unit(s)/Min (2.4 mL/Hr) IV Continuous <Continuous>    MEDICATIONS  (PRN):  acetaminophen   Tablet .. 650 milliGRAM(s) Oral every 6 hours PRN Temp greater or equal to 38C (100.4F), Mild Pain (1 - 3)      Social History: see consult    Family history: see consult    ROS:   ENT: all negative except as noted in HPI   Pulm: denies SOB, cough, hemoptysis  Neuro: denies numbness/tingling, loss of sensation  Endo: denies heat/cold intolerance, excessive sweating      Vital Signs Last 24 Hrs  T(C): 36.9 (19 May 2021 12:00), Max: 37.6 (18 May 2021 20:00)  T(F): 98.4 (19 May 2021 12:00), Max: 99.7 (18 May 2021 20:00)  HR: 75 (19 May 2021 15:15) (66 - 76)  BP: --  BP(mean): --  RR: 30 (19 May 2021 15:15) (15 - 30)  SpO2: 90% (19 May 2021 15:15) (84% - 99%)                          12.8   23.53 )-----------( 289      ( 19 May 2021 00:42 )             40.5    05-19    136  |  102  |  42<H>  ----------------------------<  163<H>  3.8   |  23  |  0.54    Ca    8.0<L>      19 May 2021 00:42  Phos  2.1     05-19  Mg     2.3     05-19    TPro  5.8<L>  /  Alb  2.3<L>  /  TBili  0.7  /  DBili  x   /  AST  51<H>  /  ALT  75<H>  /  AlkPhos  134<H>  05-19   PT/INR - ( 19 May 2021 00:42 )   PT: 13.2 sec;   INR: 1.11 ratio         PTT - ( 19 May 2021 07:35 )  PTT:58.3 sec    PHYSICAL EXAM:  Gen: NAD  Skin: No rashes, bruises, or lesions  Head: Normocephalic, Atraumatic  Face: Left facial swelling and erythema, small pressure ulcer, parotid gland soft, no active bleeding, no collection  Eyes: no scleral injection  Nose: Nares bilaterally patent, no discharge  Mouth: ET tube in place, no pus expressed from the Stensen's duct, No Stridor   Neck: Flat, supple, no lymphadenopathy, trachea midline, no masses  Lymphatic: No lymphadenopathy  Resp: breathing easily, no stridor  CV: no peripheral edema/cyanosis  GI: nondistended   Peripheral vascular: no JVD or edema  Neuro: no facial droop

## 2021-05-19 NOTE — PROGRESS NOTE ADULT - PROBLEM SELECTOR PLAN 1
Likely 2/2 to COVID  unlikely bacterial pneumonia, will hold off on Abx for now  trend inflammatory markers  management for COVID as below
Warm compresses  Call ENT with questions
Likely 2/2 to COVID  unlikely bacterial pneumonia, will hold off on Abx for now    - Tolerating HFNC today, sats 92-93% on 80% 60L, titrate to keep o2 sats>92%  - proning PRN  - Tessalon, robitussin prn, tylenol prn   - encourage incentive spirometry  - management for COVID as below
Likely 2/2 to COVID  unlikely bacterial pneumonia, will hold off on Abx for now  trend inflammatory markers  management for COVID as below

## 2021-05-19 NOTE — GOALS OF CARE CONVERSATION - ADVANCED CARE PLANNING - CONVERSATION DETAILS
I called Elijah and  to have a goal of care discussion. Myla Mr. Oswald's daughter in law was also present. I discussed his worsening clinical course despite all of the life sustaining measures he is receiving from the ICU team. Unfortunately his overall prognosis is poor and mortality from his illness is high. His sons asked to make him DNR which we have done. All questions were addressed and answered.

## 2021-05-19 NOTE — PROGRESS NOTE ADULT - PROBLEM SELECTOR PROBLEM 1
Parotitis
Acute respiratory failure with hypoxia

## 2021-05-19 NOTE — PROGRESS NOTE ADULT - ASSESSMENT
61 yo M with with significant PMHx admitted for SOB and hypoxia, currently intubated, found to have mild left facial swelling noted likely due to early parotitis VS pressure ulcer. No pus expressed from stensen's duct.

## 2021-05-19 NOTE — PROGRESS NOTE ADULT - ATTENDING COMMENTS
60 M with history above here with acute hypoxemic respiratory failure / ARDS secondary to COVID-19 pneumonia. Course c/b severe sepsis with septic shock requiring vasopressor support and new onset atrial fibrillation with rapid ventricular response. He has completed courses of dexamethasone and Remdesivir. He was initially intubated 5/11 - 5/13 and then re-intubated 5/17 for respiratory decompensation. He is sedated and on a paralytic agent for ventilator dyssynchrony. Due to refractory hypoxemia has required multiple sessions of prone ventilation. Zosyn discontinued and started on meropenem 5/18 for persistent fever, worsening leukocytosis, and clinical decompensation. Follow up repeat cultures. Continue sedatives, paralysis, and lung protective ventilation. Lung compliance is poor with a plateau pressure of 30 this morning and a driving pressure of 22. Diuresis PRN to maintain net even fluid balance. Remainder of plan as detailed above. Prognosis guarded.

## 2021-05-19 NOTE — PROGRESS NOTE ADULT - ASSESSMENT
This is a 61 yo M with medical history of HTN, DM and hyperlipidemia who was admitted initially for shortness of breath for few days and hypoxemia of 88% on RA, patient was found to have COVID-19 associated pneumonia, he was started on supplemental O2 along with Decadron and Remdisivir. Patient's condition gradually deteriorated to the point he required HFNC then BPAP,  pt was intubated 5/11 as his SpO2 went down to low 80s% despite all non-invasive measures. In the ICU, patient was placed on V-AC mode with settings of 32/450/14/100, PEEP was up titrated gradually. He remained asynchronous with the ventilator despite adequate sedation, for which Nimbex drip was initiated. Also, his BP went down after high dose propofol, RIJ TLC with A line were inserted.Patient remained intubated for 2 days and was extubated to Bipap 5/13 and was able to tolerate HFNC intermittently. Afetr 3 days of extubatrion, patiennt work of breathing increased and became gradually more hypoxic, reuqiring re-intubation on 5/17.     Neuro:   - Sedated with fentanyl 3, Ketamine 4, versed 0.15  - Paralyzed on Nimbex   - Follows commands to all extremities at baseline    ENT:  -Left swollen preauricle swelling with redness and some firmness  -ENT Consulted. Warm soaks to area    CV:   - Continue Jostin 1.5 Vaso 0.04 to maintain MAP >65   - Episodes of chest pain (pleurtic?) while pt was extrubated; suspected of non-cardiologic  etiology as EKG without changes, Troponin remains at 7, and it was resolved with Tylenol/toradol    Resp:   AHRF-- VAC 30/400/8/100  - Extubated (5/13)  - Reintubated (5/17) 2/2 increased work of breathing, cxr verified ETT 25 at lip and in position  - Proned at 315pm to be supinated at 9am on 5/19  - Pneumomediastinum (5/13) resolving    GI:   - Trickle TF for now-20ccVital HP   - BM 5/16  - Continue Protonix 40 daily   - ck EKG  Renal:   - Keep net even.       Heme:   - Heparin gtt for new onset AFIB in the setting of covid dz---pt specific for goal ptt 60-80   - H+H stable   -F/u PTT around 6pm    ID:     Afebrile today f/u bld cx, spt cx from 5/17  - VAncox 1dose given and started on sharif for leukocytosis with intermittednt fevers  - UA 5/17 neg   - Zosyn empirically (5/12 - 18).   - BCx 5/12 NGTD.   COVID  - S/p Dex and remdesivir 5/3-5/7   -Pt with left facial swelling warm reddened hard to touch    Endo:   - Cover with [facility]  - Keep FS<180    Family updated d/w Akash   This is a 59 yo M with medical history of HTN, DM and hyperlipidemia who was admitted initially for shortness of breath for few days and hypoxemia of 88% on RA, patient was found to have COVID-19 associated pneumonia, he was started on supplemental O2 along with Decadron and Remdisivir. Patient's condition gradually deteriorated to the point he required HFNC then BPAP,  pt was intubated 5/11 as his SpO2 went down to low 80s% despite all non-invasive measures. In the ICU, patient was placed on V-AC mode with settings of 32/450/14/100, PEEP was up titrated gradually. He remained asynchronous with the ventilator despite adequate sedation, for which Nimbex drip was initiated. Also, his BP went down after high dose propofol, RIJ TLC with A line were inserted.Patient remained intubated for 2 days and was extubated to Bipap 5/13 and was able to tolerate HFNC intermittently. Afetr 3 days of extubatrion, patiennt work of breathing increased and became gradually more hypoxic, reuqiring re-intubation on 5/17.     Neuro:   - Sedated with fentanyl 3, Ketamine 4, versed 0.15  - Paralyzed on Nimbex 4  - Follows commands to all extremities at baseline    ENT:  -Left swollen preauricle swelling with redness and some firmness  -ENT Consulted. Warm soaks to area    CV:   - Continue Jostin 1.5 Vaso 0.04 to maintain MAP >65   - Episodes of chest pain (pleurtic?) while pt was extrubated; suspected of non-cardiologic  etiology as EKG without changes, Troponin remains at 7, and it was resolved with Tylenol/toradol    Resp:   AHRF-- VAC 30/400/8/100  - Extubated (5/13)  - Reintubated (5/17) 2/2 increased work of breathing, cxr verified ETT 25 at lip and in position  - Proned at 4:15pm proned, to be supinated at 10am on 5/20  - Pneumomediastinum (5/13) resolving    GI:   - Trickle TF for now-20ccVital HP   - BM 5/16  - Continue Protonix 40 daily   - ck EKG    Renal:   - Keep net even.       Heme:   - Heparin gtt for new onset AFIB in the setting of covid dz---pt specific for goal ptt 60-80   - H+H stable   -F/u PTT     ID:     Afebrile today f/u bld cx, spt cx from 5/17--NGTD  - VAncox 1dose given and started on sharif for leukocytosis with intermittednt fevers  - UA 5/17 neg   - Zosyn empirically (5/12 - 18). SHARIF. started empirically Leukocytosis  - BCx 5/12 NGTD.   COVID  - S/p Dex and remdesivir 5/3-5/7   -Pt with left facial swelling warm reddened hard to touch    Endo:   - Cover with [facility]  - Keep FS<180    Family updated d/w Akash

## 2021-05-19 NOTE — PROGRESS NOTE ADULT - ATTENDING COMMENTS
no clear parotitis, skin breakdown likely due to pressure, would try to reduce pressure on left cheek during prone

## 2021-05-20 LAB
ALBUMIN SERPL ELPH-MCNC: 2.1 G/DL — LOW (ref 3.3–5)
ALBUMIN SERPL ELPH-MCNC: 2.2 G/DL — LOW (ref 3.3–5)
ALP SERPL-CCNC: 152 U/L — HIGH (ref 40–120)
ALP SERPL-CCNC: 168 U/L — HIGH (ref 40–120)
ALT FLD-CCNC: 50 U/L — HIGH (ref 10–45)
ALT FLD-CCNC: 62 U/L — HIGH (ref 10–45)
ANION GAP SERPL CALC-SCNC: 10 MMOL/L — SIGNIFICANT CHANGE UP (ref 5–17)
ANION GAP SERPL CALC-SCNC: 9 MMOL/L — SIGNIFICANT CHANGE UP (ref 5–17)
APTT BLD: 54.7 SEC — HIGH (ref 27.5–35.5)
APTT BLD: 59 SEC — HIGH (ref 27.5–35.5)
AST SERPL-CCNC: 31 U/L — SIGNIFICANT CHANGE UP (ref 10–40)
AST SERPL-CCNC: 36 U/L — SIGNIFICANT CHANGE UP (ref 10–40)
BILIRUB SERPL-MCNC: 0.5 MG/DL — SIGNIFICANT CHANGE UP (ref 0.2–1.2)
BILIRUB SERPL-MCNC: 0.6 MG/DL — SIGNIFICANT CHANGE UP (ref 0.2–1.2)
BUN SERPL-MCNC: 32 MG/DL — HIGH (ref 7–23)
BUN SERPL-MCNC: 32 MG/DL — HIGH (ref 7–23)
CALCIUM SERPL-MCNC: 8 MG/DL — LOW (ref 8.4–10.5)
CALCIUM SERPL-MCNC: 8.2 MG/DL — LOW (ref 8.4–10.5)
CHLORIDE SERPL-SCNC: 103 MMOL/L — SIGNIFICANT CHANGE UP (ref 96–108)
CHLORIDE SERPL-SCNC: 104 MMOL/L — SIGNIFICANT CHANGE UP (ref 96–108)
CO2 SERPL-SCNC: 23 MMOL/L — SIGNIFICANT CHANGE UP (ref 22–31)
CO2 SERPL-SCNC: 26 MMOL/L — SIGNIFICANT CHANGE UP (ref 22–31)
CREAT SERPL-MCNC: 0.59 MG/DL — SIGNIFICANT CHANGE UP (ref 0.5–1.3)
CREAT SERPL-MCNC: 0.63 MG/DL — SIGNIFICANT CHANGE UP (ref 0.5–1.3)
CULTURE RESULTS: SIGNIFICANT CHANGE UP
D DIMER BLD IA.RAPID-MCNC: 1297 NG/ML DDU — HIGH
FERRITIN SERPL-MCNC: 1897 NG/ML — HIGH (ref 30–400)
GAS PNL BLDA: SIGNIFICANT CHANGE UP
GLUCOSE BLDC GLUCOMTR-MCNC: 144 MG/DL — HIGH (ref 70–99)
GLUCOSE BLDC GLUCOMTR-MCNC: 158 MG/DL — HIGH (ref 70–99)
GLUCOSE BLDC GLUCOMTR-MCNC: 194 MG/DL — HIGH (ref 70–99)
GLUCOSE BLDC GLUCOMTR-MCNC: 201 MG/DL — HIGH (ref 70–99)
GLUCOSE SERPL-MCNC: 144 MG/DL — HIGH (ref 70–99)
GLUCOSE SERPL-MCNC: 177 MG/DL — HIGH (ref 70–99)
HCT VFR BLD CALC: 36.9 % — LOW (ref 39–50)
HCT VFR BLD CALC: 39.4 % — SIGNIFICANT CHANGE UP (ref 39–50)
HGB BLD-MCNC: 11.6 G/DL — LOW (ref 13–17)
HGB BLD-MCNC: 12.2 G/DL — LOW (ref 13–17)
INR BLD: 1.09 RATIO — SIGNIFICANT CHANGE UP (ref 0.88–1.16)
MAGNESIUM SERPL-MCNC: 2 MG/DL — SIGNIFICANT CHANGE UP (ref 1.6–2.6)
MAGNESIUM SERPL-MCNC: 2 MG/DL — SIGNIFICANT CHANGE UP (ref 1.6–2.6)
MCHC RBC-ENTMCNC: 28.7 PG — SIGNIFICANT CHANGE UP (ref 27–34)
MCHC RBC-ENTMCNC: 29.6 PG — SIGNIFICANT CHANGE UP (ref 27–34)
MCHC RBC-ENTMCNC: 31 GM/DL — LOW (ref 32–36)
MCHC RBC-ENTMCNC: 31.4 GM/DL — LOW (ref 32–36)
MCV RBC AUTO: 92.7 FL — SIGNIFICANT CHANGE UP (ref 80–100)
MCV RBC AUTO: 94.1 FL — SIGNIFICANT CHANGE UP (ref 80–100)
NRBC # BLD: 0 /100 WBCS — SIGNIFICANT CHANGE UP (ref 0–0)
NRBC # BLD: 0 /100 WBCS — SIGNIFICANT CHANGE UP (ref 0–0)
PHOSPHATE SERPL-MCNC: 1.8 MG/DL — LOW (ref 2.5–4.5)
PHOSPHATE SERPL-MCNC: 2 MG/DL — LOW (ref 2.5–4.5)
PLATELET # BLD AUTO: 255 K/UL — SIGNIFICANT CHANGE UP (ref 150–400)
PLATELET # BLD AUTO: 289 K/UL — SIGNIFICANT CHANGE UP (ref 150–400)
POTASSIUM SERPL-MCNC: 4.2 MMOL/L — SIGNIFICANT CHANGE UP (ref 3.5–5.3)
POTASSIUM SERPL-MCNC: 4.5 MMOL/L — SIGNIFICANT CHANGE UP (ref 3.5–5.3)
POTASSIUM SERPL-SCNC: 4.2 MMOL/L — SIGNIFICANT CHANGE UP (ref 3.5–5.3)
POTASSIUM SERPL-SCNC: 4.5 MMOL/L — SIGNIFICANT CHANGE UP (ref 3.5–5.3)
PROCALCITONIN SERPL-MCNC: 0.53 NG/ML — HIGH (ref 0.02–0.1)
PROT SERPL-MCNC: 5.9 G/DL — LOW (ref 6–8.3)
PROT SERPL-MCNC: 5.9 G/DL — LOW (ref 6–8.3)
PROTHROM AB SERPL-ACNC: 13 SEC — SIGNIFICANT CHANGE UP (ref 10.6–13.6)
RBC # BLD: 3.92 M/UL — LOW (ref 4.2–5.8)
RBC # BLD: 4.25 M/UL — SIGNIFICANT CHANGE UP (ref 4.2–5.8)
RBC # FLD: 12.7 % — SIGNIFICANT CHANGE UP (ref 10.3–14.5)
RBC # FLD: 12.8 % — SIGNIFICANT CHANGE UP (ref 10.3–14.5)
SODIUM SERPL-SCNC: 137 MMOL/L — SIGNIFICANT CHANGE UP (ref 135–145)
SODIUM SERPL-SCNC: 138 MMOL/L — SIGNIFICANT CHANGE UP (ref 135–145)
SPECIMEN SOURCE: SIGNIFICANT CHANGE UP
WBC # BLD: 19.46 K/UL — HIGH (ref 3.8–10.5)
WBC # BLD: 22.59 K/UL — HIGH (ref 3.8–10.5)
WBC # FLD AUTO: 19.46 K/UL — HIGH (ref 3.8–10.5)
WBC # FLD AUTO: 22.59 K/UL — HIGH (ref 3.8–10.5)

## 2021-05-20 PROCEDURE — 71045 X-RAY EXAM CHEST 1 VIEW: CPT | Mod: 26

## 2021-05-20 PROCEDURE — 99291 CRITICAL CARE FIRST HOUR: CPT

## 2021-05-20 RX ORDER — AMIODARONE HYDROCHLORIDE 400 MG/1
150 TABLET ORAL ONCE
Refills: 0 | Status: COMPLETED | OUTPATIENT
Start: 2021-05-20 | End: 2021-05-20

## 2021-05-20 RX ORDER — AMIODARONE HYDROCHLORIDE 400 MG/1
1 TABLET ORAL
Qty: 900 | Refills: 0 | Status: DISCONTINUED | OUTPATIENT
Start: 2021-05-20 | End: 2021-05-21

## 2021-05-20 RX ORDER — FUROSEMIDE 40 MG
40 TABLET ORAL ONCE
Refills: 0 | Status: COMPLETED | OUTPATIENT
Start: 2021-05-20 | End: 2021-05-20

## 2021-05-20 RX ORDER — ENOXAPARIN SODIUM 100 MG/ML
40 INJECTION SUBCUTANEOUS EVERY 12 HOURS
Refills: 0 | Status: DISCONTINUED | OUTPATIENT
Start: 2021-05-21 | End: 2021-05-21

## 2021-05-20 RX ORDER — ACETAMINOPHEN 500 MG
1000 TABLET ORAL ONCE
Refills: 0 | Status: COMPLETED | OUTPATIENT
Start: 2021-05-20 | End: 2021-05-20

## 2021-05-20 RX ORDER — HEPARIN SODIUM 5000 [USP'U]/ML
1350 INJECTION INTRAVENOUS; SUBCUTANEOUS
Qty: 25000 | Refills: 0 | Status: DISCONTINUED | OUTPATIENT
Start: 2021-05-20 | End: 2021-05-20

## 2021-05-20 RX ORDER — AMIODARONE HYDROCHLORIDE 400 MG/1
0.5 TABLET ORAL
Qty: 900 | Refills: 0 | Status: DISCONTINUED | OUTPATIENT
Start: 2021-05-20 | End: 2021-05-21

## 2021-05-20 RX ORDER — METOPROLOL TARTRATE 50 MG
5 TABLET ORAL ONCE
Refills: 0 | Status: COMPLETED | OUTPATIENT
Start: 2021-05-20 | End: 2021-05-20

## 2021-05-20 RX ORDER — CALCIUM GLUCONATE 100 MG/ML
2 VIAL (ML) INTRAVENOUS ONCE
Refills: 0 | Status: COMPLETED | OUTPATIENT
Start: 2021-05-20 | End: 2021-05-20

## 2021-05-20 RX ADMIN — KETAMINE HYDROCHLORIDE 2.38 MG/KG/HR: 100 INJECTION INTRAMUSCULAR; INTRAVENOUS at 01:42

## 2021-05-20 RX ADMIN — AMIODARONE HYDROCHLORIDE 600 MILLIGRAM(S): 400 TABLET ORAL at 05:40

## 2021-05-20 RX ADMIN — Medication 40 MILLIGRAM(S): at 02:11

## 2021-05-20 RX ADMIN — HEPARIN SODIUM 13 UNIT(S)/HR: 5000 INJECTION INTRAVENOUS; SUBCUTANEOUS at 01:42

## 2021-05-20 RX ADMIN — FENTANYL CITRATE 2.38 MICROGRAM(S)/KG/HR: 50 INJECTION INTRAVENOUS at 19:40

## 2021-05-20 RX ADMIN — Medication 2: at 12:02

## 2021-05-20 RX ADMIN — PHENYLEPHRINE HYDROCHLORIDE 53.6 MICROGRAM(S)/KG/MIN: 10 INJECTION INTRAVENOUS at 14:53

## 2021-05-20 RX ADMIN — VASOPRESSIN 2.4 UNIT(S)/MIN: 20 INJECTION INTRAVENOUS at 01:41

## 2021-05-20 RX ADMIN — KETAMINE HYDROCHLORIDE 2.38 MG/KG/HR: 100 INJECTION INTRAMUSCULAR; INTRAVENOUS at 09:39

## 2021-05-20 RX ADMIN — CHLORHEXIDINE GLUCONATE 15 MILLILITER(S): 213 SOLUTION TOPICAL at 05:31

## 2021-05-20 RX ADMIN — MEROPENEM 100 MILLIGRAM(S): 1 INJECTION INTRAVENOUS at 13:04

## 2021-05-20 RX ADMIN — MEROPENEM 100 MILLIGRAM(S): 1 INJECTION INTRAVENOUS at 05:31

## 2021-05-20 RX ADMIN — FENTANYL CITRATE 2.38 MICROGRAM(S)/KG/HR: 50 INJECTION INTRAVENOUS at 01:42

## 2021-05-20 RX ADMIN — KETAMINE HYDROCHLORIDE 2.38 MG/KG/HR: 100 INJECTION INTRAMUSCULAR; INTRAVENOUS at 19:39

## 2021-05-20 RX ADMIN — CISATRACURIUM BESYLATE 17.2 MICROGRAM(S)/KG/MIN: 2 INJECTION INTRAVENOUS at 01:42

## 2021-05-20 RX ADMIN — KETAMINE HYDROCHLORIDE 2.38 MG/KG/HR: 100 INJECTION INTRAMUSCULAR; INTRAVENOUS at 17:43

## 2021-05-20 RX ADMIN — MEROPENEM 100 MILLIGRAM(S): 1 INJECTION INTRAVENOUS at 21:47

## 2021-05-20 RX ADMIN — Medication 2: at 05:29

## 2021-05-20 RX ADMIN — AMIODARONE HYDROCHLORIDE 33.3 MG/MIN: 400 TABLET ORAL at 05:45

## 2021-05-20 RX ADMIN — MIDAZOLAM HYDROCHLORIDE 1.91 MG/KG/HR: 1 INJECTION, SOLUTION INTRAMUSCULAR; INTRAVENOUS at 19:39

## 2021-05-20 RX ADMIN — KETAMINE HYDROCHLORIDE 2.38 MG/KG/HR: 100 INJECTION INTRAMUSCULAR; INTRAVENOUS at 12:00

## 2021-05-20 RX ADMIN — SENNA PLUS 10 MILLILITER(S): 8.6 TABLET ORAL at 21:49

## 2021-05-20 RX ADMIN — Medication 40 MILLIGRAM(S): at 18:37

## 2021-05-20 RX ADMIN — FENTANYL CITRATE 2.38 MICROGRAM(S)/KG/HR: 50 INJECTION INTRAVENOUS at 12:16

## 2021-05-20 RX ADMIN — Medication 1000 UNIT(S): at 12:01

## 2021-05-20 RX ADMIN — Medication 62.5 MILLIMOLE(S): at 01:41

## 2021-05-20 RX ADMIN — Medication 200 GRAM(S): at 17:51

## 2021-05-20 RX ADMIN — PANTOPRAZOLE SODIUM 40 MILLIGRAM(S): 20 TABLET, DELAYED RELEASE ORAL at 12:00

## 2021-05-20 RX ADMIN — Medication 5 MILLIGRAM(S): at 03:41

## 2021-05-20 RX ADMIN — POLYETHYLENE GLYCOL 3350 17 GRAM(S): 17 POWDER, FOR SOLUTION ORAL at 05:31

## 2021-05-20 RX ADMIN — Medication 30 MILLILITER(S): at 12:01

## 2021-05-20 RX ADMIN — Medication 0: at 17:33

## 2021-05-20 RX ADMIN — CISATRACURIUM BESYLATE 17.2 MICROGRAM(S)/KG/MIN: 2 INJECTION INTRAVENOUS at 09:39

## 2021-05-20 RX ADMIN — MIDAZOLAM HYDROCHLORIDE 1.91 MG/KG/HR: 1 INJECTION, SOLUTION INTRAMUSCULAR; INTRAVENOUS at 09:39

## 2021-05-20 RX ADMIN — Medication 3 MILLILITER(S): at 13:21

## 2021-05-20 RX ADMIN — PHENYLEPHRINE HYDROCHLORIDE 53.6 MICROGRAM(S)/KG/MIN: 10 INJECTION INTRAVENOUS at 01:42

## 2021-05-20 RX ADMIN — CISATRACURIUM BESYLATE 17.2 MICROGRAM(S)/KG/MIN: 2 INJECTION INTRAVENOUS at 19:39

## 2021-05-20 RX ADMIN — POLYETHYLENE GLYCOL 3350 17 GRAM(S): 17 POWDER, FOR SOLUTION ORAL at 17:32

## 2021-05-20 RX ADMIN — VASOPRESSIN 2.4 UNIT(S)/MIN: 20 INJECTION INTRAVENOUS at 19:39

## 2021-05-20 RX ADMIN — CHLORHEXIDINE GLUCONATE 15 MILLILITER(S): 213 SOLUTION TOPICAL at 17:32

## 2021-05-20 RX ADMIN — Medication 400 MILLIGRAM(S): at 23:16

## 2021-05-20 RX ADMIN — MIDAZOLAM HYDROCHLORIDE 1.91 MG/KG/HR: 1 INJECTION, SOLUTION INTRAMUSCULAR; INTRAVENOUS at 01:42

## 2021-05-20 RX ADMIN — PHENYLEPHRINE HYDROCHLORIDE 53.6 MICROGRAM(S)/KG/MIN: 10 INJECTION INTRAVENOUS at 19:39

## 2021-05-20 RX ADMIN — Medication 1000 MILLIGRAM(S): at 23:30

## 2021-05-20 RX ADMIN — CHLORHEXIDINE GLUCONATE 1 APPLICATION(S): 213 SOLUTION TOPICAL at 06:01

## 2021-05-20 RX ADMIN — AMIODARONE HYDROCHLORIDE 600 MILLIGRAM(S): 400 TABLET ORAL at 05:53

## 2021-05-20 RX ADMIN — Medication 5 MILLIGRAM(S): at 05:46

## 2021-05-20 RX ADMIN — KETAMINE HYDROCHLORIDE 2.38 MG/KG/HR: 100 INJECTION INTRAMUSCULAR; INTRAVENOUS at 14:52

## 2021-05-20 NOTE — PROGRESS NOTE ADULT - SUBJECTIVE AND OBJECTIVE BOX
CHIEF COMPLAINT:  Patient is a 60y old  Male who presents with a chief complaint of shortness of breath (18 May 2021 15:55)    HPI:  61 yo M with PMH of HTN, HLD, T2DM, presents here with SOB and hypoxia.  Patient started feeling fatigue and malaise on Monday.  Over the last three days, he also started having decreased appetite, mild cough, SOB, feverish, chills.  He has been mostly in bed, not able to do his usual ADLs.  This morning he had an episode of vomiting.  He was also having some back pain, which is worse with walking.  He had no diarrhea.  Daughter-in-law checked his pulse ox at home which was 88%, which prompted her to bring him to hospital.  While in ED, patient felt lethargic and had a brief 20 sec period of unresponsiveness.  Patient denies losing consciousness; states he remembers hearing his daughter-in-law calling his name, but he did not respond because he was not feeling well.  In ED, his vitals showed temp 98.5, HR 89, /70, RR 19-26, saturation 88% on RA.   (03 May 2021 03:11)      Interval Events: Pt received Metoprolol 5mg IV x 2 for rapid A. fib with minimal improvement and was then started on Amiodarone infusion.  Pt received Lasix 40mg IV x 2.        REVIEW OF SYSTEMS: Unable as pt is sedated      OBJECTIVE:  ICU Vital Signs Last 24 Hrs  T(C): 36.2 (19 May 2021 04:00), Max: 38.1 (18 May 2021 07:15)  T(F): 97.2 (19 May 2021 04:00), Max: 100.6 (18 May 2021 07:15)  HR: 75 (19 May 2021 05:45) (51 - 113)  BP: 146/74 (18 May 2021 10:45) (146/74 - 146/74)  BP(mean): 103 (18 May 2021 10:45) (103 - 103)  ABP: 107/61 (19 May 2021 05:45) (78/51 - 183/80)  ABP(mean): 78 (19 May 2021 05:45) (60 - 116)  RR: 30 (19 May 2021 05:45) (15 - 30)  SpO2: 94% (19 May 2021 05:45) (90% - 99%)    Mode: AC/ CMV (Assist Control/ Continuous Mandatory Ventilation), RR (machine): 30, TV (machine): 400, FiO2: 100, PEEP: 8, ITime: 1, MAP: 15, PIP: 33    05 @ 07:01  -   @ 07:00  --------------------------------------------------------  IN: 3244.5 mL / OUT: 1155 mL / NET: 2089.5 mL     @ 07:  -   @ 06:12  --------------------------------------------------------  IN: 4557.8 mL / OUT: 1230 mL / NET: 3327.8 mL      CAPILLARY BLOOD GLUCOSE      POCT Blood Glucose.: 160 mg/dL (19 May 2021 04:47)      PHYSICAL EXAM:  GENERAL: Intubated, sedated.   HEENT:  Atraumatic, Normocephalic  EYES: PERRLA, conjunctiva and sclera clear  NECK: Supple, No JVD  CHEST/LUNG: diminished bilaterally; No wheezes, rales, or rhonchi  HEART: Regular rate and rhythm; No murmurs, rubs, or gallops  ABDOMEN: Soft, Nontender, Nondistended; Bowel sounds present  EXTREMITIES:  2+ Peripheral Pulses, No clubbing, cyanosis, or edema  PSYCH: unable as pt is sedated  NEUROLOGY: sedated  SKIN: No rashes or lesions        HOSPITAL MEDICATIONS:  MEDICATIONS  (STANDING):  ALBUTerol    90 MICROgram(s) HFA Inhaler 1 Puff(s) Inhalation every 4 hours  albuterol/ipratropium for Nebulization 3 milliLiter(s) Nebulizer every 6 hours  chlorhexidine 0.12% Liquid 15 milliLiter(s) Oral Mucosa every 12 hours  chlorhexidine 4% Liquid 1 Application(s) Topical <User Schedule>  cholecalciferol 1000 Unit(s) Oral daily  cisatracurium Infusion 3 MICROgram(s)/kG/Min (17.2 mL/Hr) IV Continuous <Continuous>  fentaNYL   Infusion... 0.5 MICROgram(s)/kG/Hr (2.38 mL/Hr) IV Continuous <Continuous>  heparin  Infusion 1000 Unit(s)/Hr (11 mL/Hr) IV Continuous <Continuous>  insulin lispro (ADMELOG) corrective regimen sliding scale   SubCutaneous every 6 hours  ketamine Infusion. 0.25 mG/kG/Hr (2.38 mL/Hr) IV Continuous <Continuous>  meropenem  IVPB 1000 milliGRAM(s) IV Intermittent every 8 hours  midazolam Infusion 0.02 mG/kG/Hr (1.91 mL/Hr) IV Continuous <Continuous>  norepinephrine Infusion 0.05 MICROgram(s)/kG/Min (8.93 mL/Hr) IV Continuous <Continuous>  pantoprazole  Injectable 40 milliGRAM(s) IV Push daily  phenylephrine    Infusion 3 MICROgram(s)/kG/Min (53.6 mL/Hr) IV Continuous <Continuous>  polyethylene glycol 3350 17 Gram(s) Oral two times a day  senna Syrup 10 milliLiter(s) Oral at bedtime  vasopressin Infusion 0.04 Unit(s)/Min (2.4 mL/Hr) IV Continuous <Continuous>    MEDICATIONS  (PRN):  acetaminophen   Tablet .. 650 milliGRAM(s) Oral every 6 hours PRN Temp greater or equal to 38C (100.4F), Mild Pain (1 - 3)      LABS:                                   12.2   19.46 )-----------( 255      ( 20 May 2021 00:32 )             39.4       05-20    137  |  104  |  32<H>  ----------------------------<  177<H>  4.5   |  23  |  0.63    Ca    8.0<L>      20 May 2021 00:32  Phos  2.0     05-20  Mg     2.0     05-20    TPro  5.9<L>  /  Alb  2.1<L>  /  TBili  0.6  /  DBili  x   /  AST  36  /  ALT  62<H>  /  AlkPhos  152<H>  05-20    PT/INR - ( 20 May 2021 00:32 )   PT: 13.0 sec;   INR: 1.09 ratio         PTT - ( 20 May 2021 00:32 )  PTT:59.0 sec    ABG - ( 20 May 2021 00:41 )  pH, Arterial: 7.31  pH, Blood: x     /  pCO2: 62    /  pO2: 67    / HCO3: 30    / Base Excess: 2.5   /  SaO2: 92          Urinalysis Basic - ( 17 May 2021 18:21 )    Color: Yellow / Appearance: Slightly Turbid / S.042 / pH: x  Gluc: x / Ketone: Negative  / Bili: Negative / Urobili: Negative   Blood: x / Protein: 30 mg/dL / Nitrite: Negative   Leuk Esterase: Negative / RBC: 80 /hpf / WBC 7 /HPF   Sq Epi: x / Non Sq Epi: 3 /hpf / Bacteria: Negative      MICROBIOLOGY:     RADIOLOGY:  [ ] Reviewed and interpreted by me    EKG:       CHIEF COMPLAINT:  Patient is a 60y old  Male who presents with a chief complaint of shortness of breath (18 May 2021 15:55)    HPI:  59 yo M with PMH of HTN, HLD, T2DM, presents here with SOB and hypoxia.  Patient started feeling fatigue and malaise on Monday.  Over the last three days, he also started having decreased appetite, mild cough, SOB, feverish, chills.  He has been mostly in bed, not able to do his usual ADLs.  This morning he had an episode of vomiting.  He was also having some back pain, which is worse with walking.  He had no diarrhea.  Daughter-in-law checked his pulse ox at home which was 88%, which prompted her to bring him to hospital.  While in ED, patient felt lethargic and had a brief 20 sec period of unresponsiveness.  Patient denies losing consciousness; states he remembers hearing his daughter-in-law calling his name, but he did not respond because he was not feeling well.  In ED, his vitals showed temp 98.5, HR 89, /70, RR 19-26, saturation 88% on RA.   (03 May 2021 03:11)      Interval Events: Patient was proned at 4:15pm on .  Pt received Metoprolol 5mg IV x 2 for rapid A. fib with minimal improvement and was then started on Amiodarone infusion.  Pt received Lasix 40mg IV x 2.        REVIEW OF SYSTEMS: Unable as pt is sedated      OBJECTIVE:  ICU Vital Signs Last 24 Hrs  T(C): 37.5 (20 May 2021 08:00), Max: 37.5 (20 May 2021 04:00)  T(F): 99.5 (20 May 2021 08:00), Max: 99.5 (20 May 2021 04:00)  HR: 67 (20 May 2021 11:00) (67 - 167)  RR: 34 (20 May 2021 11:00) (3 - 35)  SpO2: 98% (20 May 2021 11:00) (80% - 100%)    Mode: AC/ CMV (Assist Control/ Continuous Mandatory Ventilation), RR (machine): 34, TV (machine): 400, FiO2: 100, PEEP: 8, ITime: 1, MAP: 15, PIP: 33    I&O's Summary    19 May 2021 07:01  -  20 May 2021 07:00  --------------------------------------------------------  IN: 4074.8 mL / OUT: 3180 mL / NET: 894.8 mL        CAPILLARY BLOOD GLUCOSE      POCT Blood Glucose.: 160 mg/dL (19 May 2021 04:47)      PHYSICAL EXAM:  GENERAL: Intubated, sedated/paralyzed, proned.    HEENT:  Atraumatic, Normocephalic  EYES: PERRLA, conjunctiva and sclera clear  NECK: Supple, No JVD  CHEST/LUNG: diminished bilaterally; No wheezes, rales, or rhonchi  HEART: Regular rate and rhythm; No murmurs, rubs, or gallops  ABDOMEN: Soft, Nontender, Nondistended; Bowel sounds present  EXTREMITIES:  2+ Peripheral Pulses, No clubbing, cyanosis, or edema  PSYCH: unable as pt is sedated  NEUROLOGY: sedated  SKIN: No rashes or lesions        HOSPITAL MEDICATIONS:  MEDICATIONS  (STANDING):  ALBUTerol    90 MICROgram(s) HFA Inhaler 1 Puff(s) Inhalation every 4 hours  albuterol/ipratropium for Nebulization 3 milliLiter(s) Nebulizer every 6 hours  chlorhexidine 0.12% Liquid 15 milliLiter(s) Oral Mucosa every 12 hours  chlorhexidine 4% Liquid 1 Application(s) Topical <User Schedule>  cholecalciferol 1000 Unit(s) Oral daily  cisatracurium Infusion 3 MICROgram(s)/kG/Min (17.2 mL/Hr) IV Continuous <Continuous>  fentaNYL   Infusion... 0.5 MICROgram(s)/kG/Hr (2.38 mL/Hr) IV Continuous <Continuous>  heparin  Infusion 1000 Unit(s)/Hr (11 mL/Hr) IV Continuous <Continuous>  insulin lispro (ADMELOG) corrective regimen sliding scale   SubCutaneous every 6 hours  ketamine Infusion. 0.25 mG/kG/Hr (2.38 mL/Hr) IV Continuous <Continuous>  meropenem  IVPB 1000 milliGRAM(s) IV Intermittent every 8 hours  midazolam Infusion 0.02 mG/kG/Hr (1.91 mL/Hr) IV Continuous <Continuous>  pantoprazole  Injectable 40 milliGRAM(s) IV Push daily  phenylephrine    Infusion 3 MICROgram(s)/kG/Min (53.6 mL/Hr) IV Continuous <Continuous>  polyethylene glycol 3350 17 Gram(s) Oral two times a day  senna Syrup 10 milliLiter(s) Oral at bedtime  vasopressin Infusion 0.04 Unit(s)/Min (2.4 mL/Hr) IV Continuous <Continuous>    MEDICATIONS  (PRN):  acetaminophen   Tablet .. 650 milliGRAM(s) Oral every 6 hours PRN Temp greater or equal to 38C (100.4F), Mild Pain (1 - 3)      LABS:                                   12.2   19.46 )-----------( 255      ( 20 May 2021 00:32 )             39.4       05-20    137  |  104  |  32<H>  ----------------------------<  177<H>  4.5   |  23  |  0.63    Ca    8.0<L>      20 May 2021 00:32  Phos  2.0     05-20  Mg     2.0     -20    TPro  5.9<L>  /  Alb  2.1<L>  /  TBili  0.6  /  DBili  x   /  AST  36  /  ALT  62<H>  /  AlkPhos  152<H>  05-20    PT/INR - ( 20 May 2021 00:32 )   PT: 13.0 sec;   INR: 1.09 ratio         PTT - ( 20 May 2021 00:32 )  PTT:59.0 sec    ABG - ( 20 May 2021 00:41 )  pH, Arterial: 7.31  pH, Blood: x     /  pCO2: 62    /  pO2: 67    / HCO3: 30    / Base Excess: 2.5   /  SaO2: 92          Urinalysis Basic - ( 17 May 2021 18:21 )    Color: Yellow / Appearance: Slightly Turbid / S.042 / pH: x  Gluc: x / Ketone: Negative  / Bili: Negative / Urobili: Negative   Blood: x / Protein: 30 mg/dL / Nitrite: Negative   Leuk Esterase: Negative / RBC: 80 /hpf / WBC 7 /HPF   Sq Epi: x / Non Sq Epi: 3 /hpf / Bacteria: Negative      MICROBIOLOGY:     RADIOLOGY:  [ ] Reviewed and interpreted by me    EKG:

## 2021-05-20 NOTE — PROGRESS NOTE ADULT - ATTENDING COMMENTS
60 M with history above here with acute hypoxemic respiratory failure / ARDS secondary to COVID-19 pneumonia. Course c/b severe sepsis with septic shock requiring vasopressor support and new onset atrial fibrillation with rapid ventricular response. He has completed courses of dexamethasone and Remdesivir. He was initially intubated 5/11 - 5/13 and then re-intubated 5/17 for respiratory decompensation. He is sedated and on a paralytic agent for ventilator dyssynchrony. Due to refractory hypoxemia has required multiple sessions of prone ventilation. Zosyn discontinued and started on meropenem 5/18 for persistent fever, worsening leukocytosis, and clinical decompensation. Follow up repeat cultures. Continue sedatives, paralysis, and lung protective ventilation. Lung compliance is poor with a plateau pressure of 35 this morning and a driving pressure of 27. Diuresis PRN to maintain net even fluid balance. Code status changed overnight to DNR. Remainder of plan as detailed above. Prognosis guarded.

## 2021-05-20 NOTE — PROGRESS NOTE ADULT - ASSESSMENT
This is a 59 yo M with medical history of HTN, DM and hyperlipidemia who was admitted initially for shortness of breath for few days and hypoxemia of 88% on RA, patient was found to have COVID-19 associated pneumonia, he was started on supplemental O2 along with Decadron and Remdisivir. Patient's condition gradually deteriorated to the point he required HFNC then BPAP,  pt was intubated 5/11 as his SpO2 went down to low 80s% despite all non-invasive measures. In the ICU, patient was placed on V-AC mode with settings of 32/450/14/100, PEEP was up titrated gradually. He remained asynchronous with the ventilator despite adequate sedation, for which Nimbex drip was initiated. Also, his BP went down after high dose propofol, RIJ TLC with A line were inserted.Patient remained intubated for 2 days and was extubated to Bipap 5/13 and was able to tolerate HFNC intermittently. Afetr 3 days of extubatrion, patiennt work of breathing increased and became gradually more hypoxic, requiring re-intubation on 5/17.     Neuro:   - Sedated with fentanyl 3, Ketamine 4, versed 0.15  - Paralyzed on Nimbex 4  - Follows commands to all extremities at baseline    ENT:  -Left swollen preauricle swelling with redness and some firmness  -ENT Consulted. Warm soaks to area      Resp:   AHRF-- VAC 34/400/8/100  - Extubated (5/13)  - Reintubated (5/17) 2/2 increased work of breathing, cxr verified ETT 25 at lip and in position  - Proned at 4:15pm proned, to be supinated at 10am today   - Pneumomediastinum (5/13) resolving    CV:   - Continue Jostin @ 2,  Vaso @ 0.04 to maintain MAP >65   - Amiodarone infusion for rapid A.fib       GI:   - Trickle TF for now-20ccVital HP   - BM 5/16  - Continue Protonix 40 daily   - ck EKG    Renal:   - s/p Lasix 40mg IV x 2 overnight, redose today   - Keep net even.     ID:     Afebrile today f/u bld cx, spt cx from 5/17--NGTD  - Zosyn empirically (5/12 - 18). LUISA. started empirically Leukocytosis  COVID  - S/p Dex and remdesivir 5/3-5/7       Heme:   - Heparin gtt for new onset AFIB in the setting of covid dz---pt specific for goal ptt 60-80   - H+H stable   -F/u PTT       Endo:   - ISS q 6hrs   - Keep FS<180    Dispo: 5 ICU care.  DNR.  Plan for family to come in after patient supined today.    This is a 59 yo M with medical history of HTN, DM and hyperlipidemia who was admitted initially for shortness of breath for few days and hypoxemia of 88% on RA, patient was found to have COVID-19 associated pneumonia, he was started on supplemental O2 along with Decadron and Remdisivir. Patient's condition gradually deteriorated to the point he required HFNC then BPAP,  pt was intubated 5/11 as his SpO2 went down to low 80s% despite all non-invasive measures. In the ICU, patient was placed on V-AC mode with settings of 32/450/14/100, PEEP was up titrated gradually. He remained asynchronous with the ventilator despite adequate sedation, for which Nimbex drip was initiated. Also, his BP went down after high dose propofol, RIJ TLC with A line were inserted.Patient remained intubated for 2 days and was extubated to Bipap 5/13 and was able to tolerate HFNC intermittently. Afetr 3 days of extubatrion, patiennt work of breathing increased and became gradually more hypoxic, requiring re-intubation on 5/17.     Neuro:   - Sedated with fentanyl 3, Ketamine 4, versed 0.15  - Paralyzed on Nimbex 4  - Follows commands to all extremities at baseline    ENT:  -Left swollen preauricle swelling with redness and some firmness  -ENT Consulted. Warm soaks to area      Resp:   AHRF-- VAC 34/400/8/100  - Extubated (5/13)  - Reintubated (5/17) 2/2 increased work of breathing, cxr verified ETT 25 at lip and in position  - Proned, plan to supinate patient at 10am today.  Repeat ABG after supine.   - Pneumomediastinum (5/13) resolving    CV:   - Continue Jostin @ 2,  Vaso @ 0.04 to maintain MAP >65   - Amiodarone infusion for rapid A.fib       GI:   - Trickle TF for now-20ccVital.  Increase TF slowly to goal once supine.   - BM 5/16  - Continue Protonix 40 daily   - ck EKG    Renal:   - s/p Lasix 40mg IV x 2 overnight  - Keep net even.     ID:     Afebrile today f/u bld cx, spt cx from 5/17--NGTD  - Zosyn empirically (5/12 - 18). LUISA. started empirically Leukocytosis  COVID  - S/p Dex and remdesivir 5/3-5/7       Heme:   - Heparin gtt for new onset AFIB in the setting of covid dz---pt specific for goal ptt 60-80   - H+H stable   -F/u PTT       Endo:   - ISS q 6hrs   - Keep FS<180    Dispo: 5 ICU care.  DNR.  Plan for family to come in after patient supined today.

## 2021-05-21 VITALS — HEART RATE: 76 BPM | RESPIRATION RATE: 34 BRPM

## 2021-05-21 LAB
ALBUMIN SERPL ELPH-MCNC: 2.2 G/DL — LOW (ref 3.3–5)
ALBUMIN SERPL ELPH-MCNC: 2.2 G/DL — LOW (ref 3.3–5)
ALP SERPL-CCNC: 226 U/L — HIGH (ref 40–120)
ALP SERPL-CCNC: 249 U/L — HIGH (ref 40–120)
ALT FLD-CCNC: 4195 U/L — HIGH (ref 10–45)
ALT FLD-CCNC: 58 U/L — HIGH (ref 10–45)
ANION GAP SERPL CALC-SCNC: 14 MMOL/L — SIGNIFICANT CHANGE UP (ref 5–17)
ANION GAP SERPL CALC-SCNC: 21 MMOL/L — HIGH (ref 5–17)
APPEARANCE UR: ABNORMAL
APTT BLD: 32.4 SEC — SIGNIFICANT CHANGE UP (ref 27.5–35.5)
AST SERPL-CCNC: 50 U/L — HIGH (ref 10–40)
AST SERPL-CCNC: 8595 U/L — HIGH (ref 10–40)
BACTERIA # UR AUTO: NEGATIVE — SIGNIFICANT CHANGE UP
BILIRUB SERPL-MCNC: 0.6 MG/DL — SIGNIFICANT CHANGE UP (ref 0.2–1.2)
BILIRUB SERPL-MCNC: 1.5 MG/DL — HIGH (ref 0.2–1.2)
BILIRUB UR-MCNC: NEGATIVE — SIGNIFICANT CHANGE UP
BUN SERPL-MCNC: 34 MG/DL — HIGH (ref 7–23)
BUN SERPL-MCNC: 40 MG/DL — HIGH (ref 7–23)
CALCIUM SERPL-MCNC: 7.7 MG/DL — LOW (ref 8.4–10.5)
CALCIUM SERPL-MCNC: 8.5 MG/DL — SIGNIFICANT CHANGE UP (ref 8.4–10.5)
CHLORIDE SERPL-SCNC: 101 MMOL/L — SIGNIFICANT CHANGE UP (ref 96–108)
CHLORIDE SERPL-SCNC: 101 MMOL/L — SIGNIFICANT CHANGE UP (ref 96–108)
CO2 SERPL-SCNC: 16 MMOL/L — LOW (ref 22–31)
CO2 SERPL-SCNC: 22 MMOL/L — SIGNIFICANT CHANGE UP (ref 22–31)
COLOR SPEC: ABNORMAL
CREAT SERPL-MCNC: 0.91 MG/DL — SIGNIFICANT CHANGE UP (ref 0.5–1.3)
CREAT SERPL-MCNC: 1.43 MG/DL — HIGH (ref 0.5–1.3)
DIFF PNL FLD: ABNORMAL
EPI CELLS # UR: 1 /HPF — SIGNIFICANT CHANGE UP
GAS PNL BLDA: SIGNIFICANT CHANGE UP
GAS PNL BLDA: SIGNIFICANT CHANGE UP
GLUCOSE BLDC GLUCOMTR-MCNC: 136 MG/DL — HIGH (ref 70–99)
GLUCOSE SERPL-MCNC: 173 MG/DL — HIGH (ref 70–99)
GLUCOSE SERPL-MCNC: 199 MG/DL — HIGH (ref 70–99)
GLUCOSE UR QL: NEGATIVE — SIGNIFICANT CHANGE UP
HCT VFR BLD CALC: 38.6 % — LOW (ref 39–50)
HGB BLD-MCNC: 11.6 G/DL — LOW (ref 13–17)
HYALINE CASTS # UR AUTO: 10 /LPF — HIGH (ref 0–2)
INR BLD: 1.04 RATIO — SIGNIFICANT CHANGE UP (ref 0.88–1.16)
KETONES UR-MCNC: SIGNIFICANT CHANGE UP
LEUKOCYTE ESTERASE UR-ACNC: NEGATIVE — SIGNIFICANT CHANGE UP
MAGNESIUM SERPL-MCNC: 2 MG/DL — SIGNIFICANT CHANGE UP (ref 1.6–2.6)
MAGNESIUM SERPL-MCNC: 2.3 MG/DL — SIGNIFICANT CHANGE UP (ref 1.6–2.6)
MCHC RBC-ENTMCNC: 28.7 PG — SIGNIFICANT CHANGE UP (ref 27–34)
MCHC RBC-ENTMCNC: 30.1 GM/DL — LOW (ref 32–36)
MCV RBC AUTO: 95.5 FL — SIGNIFICANT CHANGE UP (ref 80–100)
MRSA PCR RESULT.: DETECTED
NITRITE UR-MCNC: NEGATIVE — SIGNIFICANT CHANGE UP
NRBC # BLD: 0 /100 WBCS — SIGNIFICANT CHANGE UP (ref 0–0)
PH UR: 6 — SIGNIFICANT CHANGE UP (ref 5–8)
PHOSPHATE SERPL-MCNC: 3.6 MG/DL — SIGNIFICANT CHANGE UP (ref 2.5–4.5)
PHOSPHATE SERPL-MCNC: 7.2 MG/DL — HIGH (ref 2.5–4.5)
PLATELET # BLD AUTO: 272 K/UL — SIGNIFICANT CHANGE UP (ref 150–400)
POTASSIUM SERPL-MCNC: 5 MMOL/L — SIGNIFICANT CHANGE UP (ref 3.5–5.3)
POTASSIUM SERPL-MCNC: 5.3 MMOL/L — SIGNIFICANT CHANGE UP (ref 3.5–5.3)
POTASSIUM SERPL-SCNC: 5 MMOL/L — SIGNIFICANT CHANGE UP (ref 3.5–5.3)
POTASSIUM SERPL-SCNC: 5.3 MMOL/L — SIGNIFICANT CHANGE UP (ref 3.5–5.3)
PROT SERPL-MCNC: 5.6 G/DL — LOW (ref 6–8.3)
PROT SERPL-MCNC: 5.8 G/DL — LOW (ref 6–8.3)
PROT UR-MCNC: 100 — SIGNIFICANT CHANGE UP
PROTHROM AB SERPL-ACNC: 12.4 SEC — SIGNIFICANT CHANGE UP (ref 10.6–13.6)
RBC # BLD: 4.04 M/UL — LOW (ref 4.2–5.8)
RBC # FLD: 13 % — SIGNIFICANT CHANGE UP (ref 10.3–14.5)
RBC CASTS # UR COMP ASSIST: 2495 /HPF — HIGH (ref 0–4)
S AUREUS DNA NOSE QL NAA+PROBE: DETECTED
SODIUM SERPL-SCNC: 137 MMOL/L — SIGNIFICANT CHANGE UP (ref 135–145)
SODIUM SERPL-SCNC: 138 MMOL/L — SIGNIFICANT CHANGE UP (ref 135–145)
SP GR SPEC: 1.02 — SIGNIFICANT CHANGE UP (ref 1.01–1.02)
UROBILINOGEN FLD QL: ABNORMAL
WBC # BLD: 19.56 K/UL — HIGH (ref 3.8–10.5)
WBC # FLD AUTO: 19.56 K/UL — HIGH (ref 3.8–10.5)
WBC UR QL: 21 /HPF — HIGH (ref 0–5)

## 2021-05-21 PROCEDURE — 93306 TTE W/DOPPLER COMPLETE: CPT

## 2021-05-21 PROCEDURE — 85014 HEMATOCRIT: CPT

## 2021-05-21 PROCEDURE — 80076 HEPATIC FUNCTION PANEL: CPT

## 2021-05-21 PROCEDURE — 82803 BLOOD GASES ANY COMBINATION: CPT

## 2021-05-21 PROCEDURE — 85730 THROMBOPLASTIN TIME PARTIAL: CPT

## 2021-05-21 PROCEDURE — 85379 FIBRIN DEGRADATION QUANT: CPT

## 2021-05-21 PROCEDURE — 84145 PROCALCITONIN (PCT): CPT

## 2021-05-21 PROCEDURE — 82565 ASSAY OF CREATININE: CPT

## 2021-05-21 PROCEDURE — 96374 THER/PROPH/DIAG INJ IV PUSH: CPT

## 2021-05-21 PROCEDURE — 94640 AIRWAY INHALATION TREATMENT: CPT

## 2021-05-21 PROCEDURE — 85610 PROTHROMBIN TIME: CPT

## 2021-05-21 PROCEDURE — 82435 ASSAY OF BLOOD CHLORIDE: CPT

## 2021-05-21 PROCEDURE — 82330 ASSAY OF CALCIUM: CPT

## 2021-05-21 PROCEDURE — 84484 ASSAY OF TROPONIN QUANT: CPT

## 2021-05-21 PROCEDURE — 85025 COMPLETE CBC W/AUTO DIFF WBC: CPT

## 2021-05-21 PROCEDURE — 85384 FIBRINOGEN ACTIVITY: CPT

## 2021-05-21 PROCEDURE — 87040 BLOOD CULTURE FOR BACTERIA: CPT

## 2021-05-21 PROCEDURE — 84132 ASSAY OF SERUM POTASSIUM: CPT

## 2021-05-21 PROCEDURE — 86850 RBC ANTIBODY SCREEN: CPT

## 2021-05-21 PROCEDURE — 86769 SARS-COV-2 COVID-19 ANTIBODY: CPT

## 2021-05-21 PROCEDURE — 86900 BLOOD TYPING SEROLOGIC ABO: CPT

## 2021-05-21 PROCEDURE — 71045 X-RAY EXAM CHEST 1 VIEW: CPT

## 2021-05-21 PROCEDURE — 81001 URINALYSIS AUTO W/SCOPE: CPT

## 2021-05-21 PROCEDURE — 87640 STAPH A DNA AMP PROBE: CPT

## 2021-05-21 PROCEDURE — 82728 ASSAY OF FERRITIN: CPT

## 2021-05-21 PROCEDURE — 94003 VENT MGMT INPAT SUBQ DAY: CPT

## 2021-05-21 PROCEDURE — 86901 BLOOD TYPING SEROLOGIC RH(D): CPT

## 2021-05-21 PROCEDURE — 86140 C-REACTIVE PROTEIN: CPT

## 2021-05-21 PROCEDURE — 94660 CPAP INITIATION&MGMT: CPT

## 2021-05-21 PROCEDURE — 94002 VENT MGMT INPAT INIT DAY: CPT

## 2021-05-21 PROCEDURE — 84100 ASSAY OF PHOSPHORUS: CPT

## 2021-05-21 PROCEDURE — 83036 HEMOGLOBIN GLYCOSYLATED A1C: CPT

## 2021-05-21 PROCEDURE — 97162 PT EVAL MOD COMPLEX 30 MIN: CPT

## 2021-05-21 PROCEDURE — 87635 SARS-COV-2 COVID-19 AMP PRB: CPT

## 2021-05-21 PROCEDURE — 85018 HEMOGLOBIN: CPT

## 2021-05-21 PROCEDURE — 83605 ASSAY OF LACTIC ACID: CPT

## 2021-05-21 PROCEDURE — 80053 COMPREHEN METABOLIC PANEL: CPT

## 2021-05-21 PROCEDURE — 85027 COMPLETE CBC AUTOMATED: CPT

## 2021-05-21 PROCEDURE — 82947 ASSAY GLUCOSE BLOOD QUANT: CPT

## 2021-05-21 PROCEDURE — 99291 CRITICAL CARE FIRST HOUR: CPT

## 2021-05-21 PROCEDURE — 86803 HEPATITIS C AB TEST: CPT

## 2021-05-21 PROCEDURE — 82248 BILIRUBIN DIRECT: CPT

## 2021-05-21 PROCEDURE — 87070 CULTURE OTHR SPECIMN AEROBIC: CPT

## 2021-05-21 PROCEDURE — 82962 GLUCOSE BLOOD TEST: CPT

## 2021-05-21 PROCEDURE — 83880 ASSAY OF NATRIURETIC PEPTIDE: CPT

## 2021-05-21 PROCEDURE — 84295 ASSAY OF SERUM SODIUM: CPT

## 2021-05-21 PROCEDURE — 87641 MR-STAPH DNA AMP PROBE: CPT

## 2021-05-21 PROCEDURE — 83735 ASSAY OF MAGNESIUM: CPT

## 2021-05-21 PROCEDURE — 36600 WITHDRAWAL OF ARTERIAL BLOOD: CPT

## 2021-05-21 PROCEDURE — 93005 ELECTROCARDIOGRAM TRACING: CPT

## 2021-05-21 PROCEDURE — 80048 BASIC METABOLIC PNL TOTAL CA: CPT

## 2021-05-21 PROCEDURE — 83615 LACTATE (LD) (LDH) ENZYME: CPT

## 2021-05-21 RX ORDER — VANCOMYCIN HCL 1 G
1000 VIAL (EA) INTRAVENOUS ONCE
Refills: 0 | Status: COMPLETED | OUTPATIENT
Start: 2021-05-21 | End: 2021-05-21

## 2021-05-21 RX ORDER — SODIUM CHLORIDE 9 MG/ML
500 INJECTION INTRAMUSCULAR; INTRAVENOUS; SUBCUTANEOUS ONCE
Refills: 0 | Status: COMPLETED | OUTPATIENT
Start: 2021-05-21 | End: 2021-05-21

## 2021-05-21 RX ORDER — HEPARIN SODIUM 5000 [USP'U]/ML
1300 INJECTION INTRAVENOUS; SUBCUTANEOUS
Qty: 25000 | Refills: 0 | Status: DISCONTINUED | OUTPATIENT
Start: 2021-05-21 | End: 2021-05-21

## 2021-05-21 RX ORDER — AMIODARONE HYDROCHLORIDE 400 MG/1
1 TABLET ORAL
Qty: 900 | Refills: 0 | Status: DISCONTINUED | OUTPATIENT
Start: 2021-05-21 | End: 2021-05-21

## 2021-05-21 RX ORDER — NOREPINEPHRINE BITARTRATE/D5W 8 MG/250ML
0.05 PLASTIC BAG, INJECTION (ML) INTRAVENOUS
Qty: 8 | Refills: 0 | Status: DISCONTINUED | OUTPATIENT
Start: 2021-05-21 | End: 2021-05-21

## 2021-05-21 RX ADMIN — CHLORHEXIDINE GLUCONATE 1 APPLICATION(S): 213 SOLUTION TOPICAL at 06:10

## 2021-05-21 RX ADMIN — POLYETHYLENE GLYCOL 3350 17 GRAM(S): 17 POWDER, FOR SOLUTION ORAL at 05:17

## 2021-05-21 RX ADMIN — Medication 4: at 00:01

## 2021-05-21 RX ADMIN — ENOXAPARIN SODIUM 40 MILLIGRAM(S): 100 INJECTION SUBCUTANEOUS at 06:03

## 2021-05-21 RX ADMIN — Medication 3 MILLILITER(S): at 11:08

## 2021-05-21 RX ADMIN — MEROPENEM 100 MILLIGRAM(S): 1 INJECTION INTRAVENOUS at 05:17

## 2021-05-21 RX ADMIN — SODIUM CHLORIDE 1000 MILLILITER(S): 9 INJECTION INTRAMUSCULAR; INTRAVENOUS; SUBCUTANEOUS at 01:00

## 2021-05-21 RX ADMIN — SODIUM CHLORIDE 1000 MILLILITER(S): 9 INJECTION INTRAMUSCULAR; INTRAVENOUS; SUBCUTANEOUS at 06:02

## 2021-05-21 RX ADMIN — CHLORHEXIDINE GLUCONATE 15 MILLILITER(S): 213 SOLUTION TOPICAL at 05:17

## 2021-05-21 RX ADMIN — Medication 250 MILLIGRAM(S): at 06:03

## 2021-05-21 NOTE — PROGRESS NOTE ADULT - ASSESSMENT
This is a 59 yo M with medical history of HTN, DM and hyperlipidemia who was admitted initially for shortness of breath for few days and hypoxemia of 88% on RA, patient was found to have COVID-19 associated pneumonia, he was started on supplemental O2 along with Decadron and Remdisivir. Patient's condition gradually deteriorated to the point he required HFNC then BPAP,  pt was intubated 5/11 as his SpO2 went down to low 80s% despite all non-invasive measures. In the ICU, patient was placed on V-AC mode with settings of 32/450/14/100, PEEP was up titrated gradually. He remained asynchronous with the ventilator despite adequate sedation, for which Nimbex drip was initiated. Also, his BP went down after high dose propofol, RIJ TLC with A line were inserted.Patient remained intubated for 2 days and was extubated to Bipap 5/13 and was able to tolerate HFNC intermittently. Afetr 3 days of extubatrion, patiennt work of breathing increased and became gradually more hypoxic, requiring re-intubation on 5/17.     Neuro:   - Sedated with fentanyl 3, Ketamine 4, versed 0.15  - Paralyzed on Nimbex 4  - Follows commands to all extremities at baseline    ENT:  -Left swollen preauricle swelling with redness and some firmness  -ENT Consulted. Warm soaks to area      Resp:   AHRF-- VAC 34/400/8/100  - Extubated (5/13)  - Reintubated (5/17) 2/2 increased work of breathing, cxr verified ETT 25 at lip and in position  - Proned, plan to supinate patient at 10am today.  Repeat ABG after supine.   - Pneumomediastinum (5/13) resolving    CV:   - Continue Jostin @ 2,  Vaso @ 0.04 to maintain MAP >65   - Amiodarone infusion for rapid A.fib       GI:   - Trickle TF for now-20ccVital.  Increase TF slowly to goal once supine.   - BM 5/16  - Continue Protonix 40 daily   - ck EKG    Renal:   - s/p Lasix 40mg IV x 2 overnight  - Keep net even.     ID:     Afebrile today f/u bld cx, spt cx from 5/17--NGTD  - Zosyn empirically (5/12 - 18). LUISA. started empirically Leukocytosis  COVID  - S/p Dex and remdesivir 5/3-5/7       Heme:   - Heparin gtt for new onset AFIB in the setting of covid dz---pt specific for goal ptt 60-80   - H+H stable   -F/u PTT       Endo:   - ISS q 6hrs   - Keep FS<180    Dispo: 5 ICU care.  DNR

## 2021-05-21 NOTE — PROGRESS NOTE ADULT - SUBJECTIVE AND OBJECTIVE BOX
STEVE RAMIREZ  MRN-[id number]  Patient is a 60y old  Male who presents with a chief complaint of shortness of breath (20 May 2021 07:27)    HPI:  59 yo M with PMH of HTN, HLD, T2DM, presents here with SOB and hypoxia.  Patient started feeling fatigue and malaise on Monday.  Over the last three days, he also started having decreased appetite, mild cough, SOB, feverish, chills.  He has been mostly in bed, not able to do his usual ADLs.  This morning he had an episode of vomiting.  He was also having some back pain, which is worse with walking.  He had no diarrhea.  Daughter-in-law checked his pulse ox at home which was 88%, which prompted her to bring him to hospital.  While in ED, patient felt lethargic and had a brief 20 sec period of unresponsiveness.  Patient denies losing consciousness; states he remembers hearing his daughter-in-law calling his name, but he did not respond because he was not feeling well.  In ED, his vitals showed temp 98.5, HR 89, /70, RR 19-26, saturation 88% on RA.       Today: Patient intubated, sedated and paralyzed.     REVIEW OF SYSTEMS:  Unable to obtain     Physical Exam:  Vital Signs Last 24 Hrs  T(C): 37 (21 May 2021 04:00), Max: 38.6 (21 May 2021 00:00)  T(F): 98.6 (21 May 2021 04:00), Max: 101.5 (21 May 2021 00:00)  HR: 94 (21 May 2021 06:00) (64 - 112)  BP: --  BP(mean): --  RR: 34 (21 May 2021 06:00) (18 - 104)  SpO2: 85% (21 May 2021 06:00) (83% - 100%)  Gen:  Awake, alert   CNS: non focal 	  Neck: no JVD  RES : clear , no wheezing    Chest:   + chest tubes                     CVS: Regular  rhythm. Normal S1/S2  Abd: Soft, non-distended. Bowel sounds present.  Skin: No rash.  Ext:  no edema, A Line    ============================I/O===========================   I&O's Detail    20 May 2021 07:01  -  21 May 2021 07:00  --------------------------------------------------------  IN:    Amiodarone: 433.8 mL    Cisatracurium: 490.6 mL    Enteral Tube Flush: 10 mL    FentaNYL: 308 mL    Heparin: 52 mL    Heparin: 81 mL    IV PiggyBack: 150 mL    Ketamine: 838.2 mL    Midazolam: 314.6 mL    Norepinephrine: 121.2 mL    Phenylephrine: 1139.4 mL    Sodium Chloride 0.9% Bolus: 500 mL    Vasopressin: 52.8 mL    Vital High Protein: 520 mL  Total IN: 5011.6 mL    OUT:    Indwelling Catheter - Urethral (mL): 1645 mL  Total OUT: 1645 mL    Total NET: 3366.6 mL        ============================ LABS =========================                        11.6   19.56 )-----------( 272      ( 21 May 2021 00:22 )             38.6         137  |  101  |  34<H>  ----------------------------<  199<H>  5.0   |  22  |  0.91    Ca    8.5      21 May 2021 00:28  Phos  3.6       Mg     2.0         TPro  5.8<L>  /  Alb  2.2<L>  /  TBili  0.6  /  DBili  x   /  AST  50<H>  /  ALT  58<H>  /  AlkPhos  226<H>      LIVER FUNCTIONS - ( 21 May 2021 00:28 )  Alb: 2.2 g/dL / Pro: 5.8 g/dL / ALK PHOS: 226 U/L / ALT: 58 U/L / AST: 50 U/L / GGT: x           PT/INR - ( 21 May 2021 00:22 )   PT: 12.4 sec;   INR: 1.04 ratio         PTT - ( 21 May 2021 00:22 )  PTT:32.4 sec  ABG - ( 21 May 2021 00:15 )  pH, Arterial: 7.26  pH, Blood: x     /  pCO2: 60    /  pO2: 105   / HCO3: 26    / Base Excess: -1.5  /  SaO2: 98                Urinalysis Basic - ( 21 May 2021 06:13 )    Color: Orange / Appearance: Turbid / S.024 / pH: x  Gluc: x / Ketone: Trace  / Bili: Negative / Urobili: 2 mg/dL   Blood: x / Protein: 100 / Nitrite: Negative   Leuk Esterase: Negative / RBC: 2495 /hpf / WBC 21 /HPF   Sq Epi: x / Non Sq Epi: 1 /hpf / Bacteria: Negative      ======================Micro/Rad/Cardio=================  Culture: Reviewed   CXR: Reviewed  Echo:Reviewed  ======================================================  PAST MEDICAL & SURGICAL HISTORY:  HTN (hypertension)    HLD (hyperlipidemia)    T2DM (type 2 diabetes mellitus)    No pertinent past surgical history

## 2021-05-21 NOTE — CHART NOTE - NSCHARTNOTESELECT_GEN_ALL_CORE
Bedside US
Rapid Response
5ICU Accept Note/Event Note
Critical Care
Event Note
Event Note
MICU POCUS
Nutrition Services

## 2021-05-21 NOTE — DISCHARGE NOTE FOR THE EXPIRED PATIENT - HOSPITAL COURSE
This is a 61 yo M with medical history of HTN, DM and hyperlipidemia who was admitted initially for shortness of breath for few days and hypoxemia of 88% on RA, patient was found to have COVID-19 associated pneumonia, he was started on supplemental O2 along with Decadron and Remdisivir. Patient's condition gradually deteriorated to the point he required HFNC then BPAP,  pt was intubated 5/11 as his SpO2 went down to low 80s% despite all non-invasive measures. In the ICU, patient was placed on V-AC mode with settings of 32/450/14/100, PEEP was up titrated gradually. He remained asynchronous with the ventilator despite adequate sedation, for which Nimbex drip was initiated. Also, his BP went down after high dose propofol, RIJ TLC with A line were inserted.Patient remained intubated for 2 days and was extubated to Bipap 5/13 and was able to tolerate HFNC intermittently. Afetr 3 days of extubatrion, patiennt work of breathing increased and became gradually more hypoxic, requiring re-intubation on 5/17. Patient status over the course of the last few days has declined requiring multiple high dose pressors ongoing progressive hypoxemia. Patient made DNR and passed on 5/21 at 14:08PM.

## 2021-05-21 NOTE — PROGRESS NOTE ADULT - NSICDXPILOT_GEN_ALL_CORE
[place]
[employer]
[place]

## 2021-05-21 NOTE — PROGRESS NOTE ADULT - ATTENDING COMMENTS
60 M with history above here with acute hypoxemic respiratory failure / ARDS secondary to COVID-19 pneumonia. Course c/b severe sepsis with septic shock requiring vasopressor support and new onset atrial fibrillation with rapid ventricular response. He has completed courses of dexamethasone and Remdesivir. He was initially intubated 5/11 - 5/13 and then re-intubated 5/17 for respiratory decompensation. He is sedated and on a paralytic agent for ventilator dyssynchrony. Due to refractory hypoxemia he has required multiple sessions of prone ventilation. Has been on meropenem since 5/18 for persistent fever, worsening leukocytosis, and clinical decompensation. Received vancomycin overnight. His vasopressor requirements have increased over the last 24 hours. He has a metabolic acidosis due to rising lactate. And despite proning, paralysis, and maximal ventilatory support he remains hypoxemic with very poor lung compliance. Discussed case with patient's son (Elijha) following AM rounds. Decision made to cap vasopressor doses and hold off on further escalating care. Will continue current management plan and supportive care. Confirmed DNR status. Prognosis is poor.

## 2021-05-21 NOTE — PROGRESS NOTE ADULT - NUTRITIONAL ASSESSMENT
This patient has been assessed with a concern for Malnutrition and has been determined to have a diagnosis/diagnoses of Severe protein-calorie malnutrition.    This patient is being managed with:   Diet NPO with Tube Feed-  Tube Feeding Modality: Nasogastric  Vital High Protein (VITALHP)  Total Volume for 24 Hours (mL): 480  Continuous  Starting Tube Feed Rate {mL per Hour}: 20     Every 2 hours  Until Goal Tube Feed Rate (mL per Hour): 20  Tube Feed Duration (in Hours): 24  Tube Feed Start Time: 18:30  No Carb Prosource TF     Qty per Day:  1  Entered: May 20 2021  6:28PM    
This patient has been assessed with a concern for Malnutrition and has been determined to have a diagnosis/diagnoses of Severe protein-calorie malnutrition.    This patient is being managed with:   Diet NPO with Tube Feed-  Tube Feeding Modality: Nasogastric  Vital High Protein (VITALHP)  Total Volume for 24 Hours (mL): 480  Continuous  Until Goal Tube Feed Rate (mL per Hour): 20  Tube Feed Duration (in Hours): 24  Tube Feed Start Time: 15:00  No Carb Prosource TF     Qty per Day:  1  Entered: May 19 2021  4:25PM    
This patient has been assessed with a concern for Malnutrition and has been determined to have a diagnosis/diagnoses of Severe protein-calorie malnutrition.    This patient is being managed with:   Diet NPO with Tube Feed-  Tube Feeding Modality: Nasogastric  Vital High Protein (VITALHP)  Total Volume for 24 Hours (mL): 480  Continuous  Until Goal Tube Feed Rate (mL per Hour): 20  Tube Feed Duration (in Hours): 24  Tube Feed Start Time: 15:00  No Carb Prosource TF     Qty per Day:  2  Entered: May 17 2021  2:24PM

## 2021-05-21 NOTE — CHART NOTE - NSCHARTNOTEFT_GEN_A_CORE
Nutrition Follow Up Note  Patient seen for: Malnutrition follow up    Chart reviewed, events noted. Pt is a 61 yo M with PMH: HTN, HLD, T2DM. Presents with SOB and hypoxia. Found to be COVID19+, with associated pneumonia.     Source: [] Patient       [x] EMR        [] RN        [] Family at bedside       [] Other:    -If unable to interview patient: [] Trach/Vent/BiPAP  [] Disoriented/confused/inappropriate to interview    Nutrition-Related Events:   - Pressors:  [] Yes    [] No   - Propofol:  [] Yes    [] No         - Rate: __mL/hr. If maintained x 24 hours, propofol will provide:     Diet Order:   Diet, NPO with Tube Feed:   Tube Feeding Modality: Nasogastric  Vital High Protein (VITALHP)  Total Volume for 24 Hours (mL): 480  Continuous  Starting Tube Feed Rate {mL per Hour}: 20     Every 2 hours  Until Goal Tube Feed Rate (mL per Hour): 20  Tube Feed Duration (in Hours): 24  Tube Feed Start Time: 18:30  No Carb Prosource TF     Qty per Day:  1 (21)      EN Order Provides:    - Total volume:    - Kcal:       ( ___kcal/kg based on __)    - Gm Protein  ( __ g/kg based on __)    - mL free water:  Protein Modular(s) Provides:  Current Pump Rate:  EN provision: ___% EN volume goal provided in past __ days    Is current diet order appropriate/adequate? [] Yes  []  No:     PO intake :   [] >75%  Adequate    [] 50-75%  Fair       [] <50%  Poor    Nutrition-related concerns:    GI:  Last BM ___.   Bowel Regimen? [] Yes   [] No  NGT Output:  IV Fluids:  Ostomy Output:  Fistula Output:     Weights:   Daily Weight in k.4 (), Weight in k (), Weight in k.3 ()    MEDICATIONS  (STANDING):  cholecalciferol  insulin lispro (ADMELOG) corrective regimen sliding scale  meropenem  IVPB  multivitamin/minerals Oral Solution (WELLESSE)  norepinephrine Infusion  pantoprazole  Injectable  phenylephrine    Infusion  polyethylene glycol 3350  senna Syrup  vasopressin Infusion    Pertinent Labs:  @ 00:28: Na 137, BUN 34<H>, Cr 0.91, <H>, K+ 5.0, Phos 3.6, Mg 2.0, Alk Phos 226<H>, ALT/SGPT 58<H>, AST/SGOT 50<H>, HbA1c --   @ 18:22: Na 138, BUN 32<H>, Cr 0.59, <H>, K+ 4.2, Phos 1.8<L>, Mg 2.0, Alk Phos 168<H>, ALT/SGPT 50<H>, AST/SGOT 31, HbA1c --    A1C with Estimated Average Glucose Result: 7.2 % (21 @ 08:55)    Finger Sticks:  POCT Blood Glucose.: 136 mg/dL ( @ 05:56)  POCT Blood Glucose.: 201 mg/dL ( @ 23:57)  POCT Blood Glucose.: 144 mg/dL ( @ 17:24)  POCT Blood Glucose.: 158 mg/dL ( @ 11:28)        Skin per nursing documentation:   Edema:     Estimated Energy Needs:  Estimated Protein Needs:  Estimated Fluid Needs:   [place] Equation:    Previous Nutrition Diagnosis:   Nutrition Diagnosis is: [] ongoing  [] resolved [] not applicable     New Nutrition Diagnosis: [] Not applicable    Nutrition Care Plan:  [] In Progress  [] Achieved  [] Not applicable    Nutrition Interventions:     Education Provided:       [] Yes:  [] No:        Recommendations:         [] Continue current diet order            [] Add oral nutrition supplement:     [] Discontinue current diet order. Recommend:      [] Add micronutrient supplementation:      [] Continue current micronutrient supplementation:      [] Other:     Monitoring and Evaluation:   Continue to monitor nutritional intake, tolerance to diet prescription, weights, labs, skin integrity      RD remains available upon request and will follow up per protocol Nutrition Follow Up Note  Patient seen for: Malnutrition follow up    Chart reviewed, events noted. Pt is a 61 yo M with PMH: HTN, HLD, T2DM. Presents with SOB and hypoxia. Found to be COVID19+, with associated pneumonia. Started on Decadron and remdisivir. pt condition gradually  deteriorated to the point he required HFNC then BiPAP; pt was intubated . Extubated , however required re-intubation . Pt now remains intubated on fentanyl, Ketamine and versed. Paralyzed on Nimbex.     Source: [] Patient       [x] EMR        [x] RN        [] Family at bedside       [x] Other: interdisciplinary medical team     -If unable to interview patient: [x] Trach/Vent/BiPAP  [x] Disoriented/confused/inappropriate to interview    Nutrition-Related Events:   - Pressors:  [x] Yes    [] No   - Propofol:  [] Yes    [x] No - discontinued .    Diet Order:   Diet, NPO with Tube Feed:   Tube Feeding Modality: Nasogastric  Vital High Protein (VITALHP)  Total Volume for 24 Hours (mL): 480  Continuous  Starting Tube Feed Rate {mL per Hour}: 20     Every 2 hours  Until Goal Tube Feed Rate (mL per Hour): 20  Tube Feed Duration (in Hours): 24  Tube Feed Start Time: 18:30  No Carb Prosource TF     Qty per Day:  1 (21)    EN Order Provides: 480ml, 576kcal and 41.9g protein.     EN provision (per nursing flow sheet):   (): 160ml (thus far)  (): 560ml (EN feeds changed from Vital HP at 40ml/hr to 20ml/hr midday)  (): 480ml (EN feeds changed from Vital HP at 50ml/hr to 40ml/hr midday)  (): 560ml (EN feeds running between 20-40ml/hr throughout day)  () 280ml - noted held 2/2 increased O2 requirements  () 320ml - EN held for nocturnal Bipap  (5/15) 20ml - help on Bipap  () 0ml - NPO on Bipap  () 425ml   *pt has received </=50% of estimated energy needs x >7 days*    Is current diet order appropriate/adequate? [] Yes  [x]  No:     Nutrition-related concerns:  -Last BM (): x3. On bowel regimen as ordered (senna, Miralax).   -Pt continues on Vitamin D3 and multivitamin as ordered.   -Pt continues on Insulin Lispro as ordered to promote optimal glycemic control.  -Currently in prone position; plan to return to supine at 11:30am.     Weights:   Daily Weight in k.4 (), Weight in kG: : 88 (), 86.3 (16), 96.3 (), 93.4 ()  7.6% wt loss noted x 2weeks (clinically significant); Noted weight fluctuations likely multifactorial 2/2 fluid shifts as pt has received diuretic therapies in-house along with inadequate EN infusion    Drug Dosing Weight  Height (cm): 177.8 ()  Weight (kg): 95.3 ()  BMI (kg/m2): 30.1 ()  IBW: 166Ibs/75.2kg    MEDICATIONS  (STANDING):  cholecalciferol  insulin lispro (ADMELOG) corrective regimen sliding scale  meropenem  IVPB  multivitamin/minerals Oral Solution (WELLESSE)  norepinephrine Infusion  pantoprazole  Injectable  phenylephrine    Infusion  polyethylene glycol 3350  senna Syrup  vasopressin Infusion    Pertinent Labs:  @ 00:28: Na 137, BUN 34<H>, Cr 0.91, <H>, K+ 5.0, Phos 3.6, Mg 2.0, Alk Phos 226<H>, ALT/SGPT 58<H>, AST/SGOT 50<H>, HbA1c --   @ 18:22: Na 138, BUN 32<H>, Cr 0.59, <H>, K+ 4.2, Phos 1.8<L>, Mg 2.0, Alk Phos 168<H>, ALT/SGPT 50<H>, AST/SGOT 31, HbA1c --    A1C with Estimated Average Glucose Result: 7.2 % (21 @ 08:55)    Finger Sticks:  POCT Blood Glucose.: 136 mg/dL ( @ 05:56)  POCT Blood Glucose.: 201 mg/dL ( @ 23:57)  POCT Blood Glucose.: 144 mg/dL ( @ 17:24)  POCT Blood Glucose.: 158 mg/dL ( @ 11:28)    Skin per nursing documentation: no pressure injuries noted  Edema: 2+ generalized    Estimated Nutritional Needs: with consideration for reintubation & BMI >30  Energy Needs (14-18 kcals/kg): 7040-0139 - Based on dosing wt 95.3kg  Protein Needs (1.4-1.8 g/kg): 105-135 - Based on IBW 75.2kg  [place] State Equation (REE): 2185kcals (23kcals/kg per dosing wt)    Previous Nutrition Diagnosis: Severe Malnutrition in setting of Acute Illness  Nutrition Diagnosis: Remains appropriate    Nutrition Care Plan:  [x] In Progress  [] Achieved  [] Not applicable    Nutrition Interventions:     Education Provided:       [] Yes:  [x] No:        Recommendations:      1. As able (when pt return to supine position), recommend resume Vital HP to goal rate 55ml/hr x 24 hrs + No Carb Prosource TF 2x daily. To provide (at dosing wt 95.3kg): 1320ml, 1400kcal (14.7kcal/kg) and (at IBW 75.2kg) 137g protein (1.8g protein/kg).   2. Recommend continue Multivitamin and Vitamin D supplementation as ordered  3. Adjust insulin regimen PRN to maintain BG <180  4. RD to remain available to adjust EN formulary, volume/rate PRN.   5. New malnutrition alert placed - Will follow-up according to protocol.     Monitoring and Evaluation:   Continue to monitor nutritional intake, tolerance to diet prescription, weights, labs, skin integrity    RD remains available upon request and will follow up per protocol

## 2021-05-22 LAB
CULTURE RESULTS: SIGNIFICANT CHANGE UP
SPECIMEN SOURCE: SIGNIFICANT CHANGE UP

## 2021-05-23 LAB
CULTURE RESULTS: SIGNIFICANT CHANGE UP
SPECIMEN SOURCE: SIGNIFICANT CHANGE UP

## 2021-05-26 LAB
CULTURE RESULTS: SIGNIFICANT CHANGE UP
CULTURE RESULTS: SIGNIFICANT CHANGE UP
SPECIMEN SOURCE: SIGNIFICANT CHANGE UP
SPECIMEN SOURCE: SIGNIFICANT CHANGE UP

## 2022-01-03 NOTE — ED ADULT NURSE NOTE - NS ED PATIENT SAFETY CONCERN
Problem: VTE, Risk for  Goal: # No s/s of VTE  Outcome: Outcome Met, Continue evaluating goal progress toward completion  Goal: # Verbalizes understanding of VTE risk factors and prevention  Description: Document education using the patient education activity.   Outcome: Outcome Met, Continue evaluating goal progress toward completion  Goal: Demonstrates ability to administer injectable anticoagulants if ordered for d/c  Description: Document education using the patient education activity.  Outcome: Outcome Met, Continue evaluating goal progress toward completion      No
